# Patient Record
Sex: FEMALE | Race: WHITE | Employment: UNEMPLOYED | URBAN - METROPOLITAN AREA
[De-identification: names, ages, dates, MRNs, and addresses within clinical notes are randomized per-mention and may not be internally consistent; named-entity substitution may affect disease eponyms.]

---

## 2017-07-19 ENCOUNTER — HOSPITAL ENCOUNTER (OUTPATIENT)
Dept: OTHER | Age: 27
Discharge: HOME | End: 2017-07-19

## 2017-07-19 DIAGNOSIS — K59.00 UNSPECIFIED CONSTIPATION: ICD-10-CM

## 2017-07-19 DIAGNOSIS — J06.9 ACUTE UPPER RESPIRATORY INFECTIONS OF OTHER MULTIPLE SITES: ICD-10-CM

## 2020-06-17 ENCOUNTER — APPOINTMENT (OUTPATIENT)
Dept: CT IMAGING | Age: 30
DRG: 282 | End: 2020-06-17
Payer: COMMERCIAL

## 2020-06-17 ENCOUNTER — HOSPITAL ENCOUNTER (INPATIENT)
Age: 30
LOS: 4 days | Discharge: HOME | DRG: 282 | End: 2020-06-21
Attending: EMERGENCY MEDICINE | Admitting: INTERNAL MEDICINE
Payer: COMMERCIAL

## 2020-06-17 PROBLEM — K85.20 ALCOHOL INDUCED ACUTE PANCREATITIS WITHOUT NECROSIS OR INFECTION: Status: ACTIVE | Noted: 2020-06-17

## 2020-06-17 LAB
A/G RATIO: 1 (ref 1.1–2.2)
ALBUMIN SERPL-MCNC: 3.9 G/DL (ref 3.4–5)
ALP BLD-CCNC: 212 U/L (ref 40–129)
ALT SERPL-CCNC: 51 U/L (ref 10–40)
ANION GAP SERPL CALCULATED.3IONS-SCNC: 18 MMOL/L (ref 3–16)
AST SERPL-CCNC: 271 U/L (ref 15–37)
BACTERIA: ABNORMAL /HPF
BASOPHILS ABSOLUTE: 0 K/UL (ref 0–0.2)
BASOPHILS RELATIVE PERCENT: 0.9 %
BILIRUB SERPL-MCNC: 1.8 MG/DL (ref 0–1)
BILIRUBIN URINE: NEGATIVE
BLOOD, URINE: ABNORMAL
BUN BLDV-MCNC: 4 MG/DL (ref 7–20)
CALCIUM SERPL-MCNC: 8.6 MG/DL (ref 8.3–10.6)
CHLORIDE BLD-SCNC: 96 MMOL/L (ref 99–110)
CLARITY: CLEAR
CO2: 24 MMOL/L (ref 21–32)
COLOR: YELLOW
CREAT SERPL-MCNC: <0.5 MG/DL (ref 0.6–1.1)
EKG ATRIAL RATE: 82 BPM
EKG DIAGNOSIS: NORMAL
EKG P AXIS: 2 DEGREES
EKG P-R INTERVAL: 114 MS
EKG Q-T INTERVAL: 432 MS
EKG QRS DURATION: 92 MS
EKG QTC CALCULATION (BAZETT): 504 MS
EKG R AXIS: 77 DEGREES
EKG T AXIS: 83 DEGREES
EKG VENTRICULAR RATE: 82 BPM
EOSINOPHILS ABSOLUTE: 0 K/UL (ref 0–0.6)
EOSINOPHILS RELATIVE PERCENT: 0.9 %
EPITHELIAL CELLS, UA: ABNORMAL /HPF (ref 0–5)
GFR AFRICAN AMERICAN: >60
GFR NON-AFRICAN AMERICAN: >60
GLOBULIN: 3.9 G/DL
GLUCOSE BLD-MCNC: 90 MG/DL (ref 70–99)
GLUCOSE URINE: NEGATIVE MG/DL
HCG(URINE) PREGNANCY TEST: NEGATIVE
HCT VFR BLD CALC: 42.4 % (ref 36–48)
HEMOGLOBIN: 14.3 G/DL (ref 12–16)
KETONES, URINE: NEGATIVE MG/DL
LACTIC ACID: 2.6 MMOL/L (ref 0.4–2)
LEUKOCYTE ESTERASE, URINE: ABNORMAL
LIPASE: 550 U/L (ref 13–60)
LYMPHOCYTES ABSOLUTE: 2.1 K/UL (ref 1–5.1)
LYMPHOCYTES RELATIVE PERCENT: 40.8 %
MCH RBC QN AUTO: 34.2 PG (ref 26–34)
MCHC RBC AUTO-ENTMCNC: 33.7 G/DL (ref 31–36)
MCV RBC AUTO: 101.3 FL (ref 80–100)
MICROSCOPIC EXAMINATION: YES
MONOCYTES ABSOLUTE: 0.5 K/UL (ref 0–1.3)
MONOCYTES RELATIVE PERCENT: 8.7 %
NEUTROPHILS ABSOLUTE: 2.5 K/UL (ref 1.7–7.7)
NEUTROPHILS RELATIVE PERCENT: 48.7 %
NITRITE, URINE: POSITIVE
PDW BLD-RTO: 13.3 % (ref 12.4–15.4)
PH UA: 7 (ref 5–8)
PLATELET # BLD: 94 K/UL (ref 135–450)
PLATELET SLIDE REVIEW: ABNORMAL
PMV BLD AUTO: 8.6 FL (ref 5–10.5)
POTASSIUM REFLEX MAGNESIUM: 3.6 MMOL/L (ref 3.5–5.1)
PROTEIN UA: ABNORMAL MG/DL
RBC # BLD: 4.18 M/UL (ref 4–5.2)
RBC UA: ABNORMAL /HPF (ref 0–4)
SLIDE REVIEW: ABNORMAL
SODIUM BLD-SCNC: 138 MMOL/L (ref 136–145)
SPECIFIC GRAVITY UA: 1.01 (ref 1–1.03)
TOTAL PROTEIN: 7.8 G/DL (ref 6.4–8.2)
TROPONIN: <0.01 NG/ML
URINE TYPE: ABNORMAL
UROBILINOGEN, URINE: 4 E.U./DL
WBC # BLD: 5.2 K/UL (ref 4–11)
WBC UA: ABNORMAL /HPF (ref 0–5)

## 2020-06-17 PROCEDURE — 80053 COMPREHEN METABOLIC PANEL: CPT

## 2020-06-17 PROCEDURE — 6360000002 HC RX W HCPCS: Performed by: INTERNAL MEDICINE

## 2020-06-17 PROCEDURE — 6360000002 HC RX W HCPCS: Performed by: EMERGENCY MEDICINE

## 2020-06-17 PROCEDURE — 2580000003 HC RX 258: Performed by: EMERGENCY MEDICINE

## 2020-06-17 PROCEDURE — 84484 ASSAY OF TROPONIN QUANT: CPT

## 2020-06-17 PROCEDURE — 2580000003 HC RX 258: Performed by: INTERNAL MEDICINE

## 2020-06-17 PROCEDURE — 84703 CHORIONIC GONADOTROPIN ASSAY: CPT

## 2020-06-17 PROCEDURE — 96374 THER/PROPH/DIAG INJ IV PUSH: CPT

## 2020-06-17 PROCEDURE — 84478 ASSAY OF TRIGLYCERIDES: CPT

## 2020-06-17 PROCEDURE — 99285 EMERGENCY DEPT VISIT HI MDM: CPT

## 2020-06-17 PROCEDURE — 83605 ASSAY OF LACTIC ACID: CPT

## 2020-06-17 PROCEDURE — U0003 INFECTIOUS AGENT DETECTION BY NUCLEIC ACID (DNA OR RNA); SEVERE ACUTE RESPIRATORY SYNDROME CORONAVIRUS 2 (SARS-COV-2) (CORONAVIRUS DISEASE [COVID-19]), AMPLIFIED PROBE TECHNIQUE, MAKING USE OF HIGH THROUGHPUT TECHNOLOGIES AS DESCRIBED BY CMS-2020-01-R: HCPCS

## 2020-06-17 PROCEDURE — 36415 COLL VENOUS BLD VENIPUNCTURE: CPT

## 2020-06-17 PROCEDURE — 85025 COMPLETE CBC W/AUTO DIFF WBC: CPT

## 2020-06-17 PROCEDURE — 96375 TX/PRO/DX INJ NEW DRUG ADDON: CPT

## 2020-06-17 PROCEDURE — 1200000000 HC SEMI PRIVATE

## 2020-06-17 PROCEDURE — 2500000003 HC RX 250 WO HCPCS: Performed by: EMERGENCY MEDICINE

## 2020-06-17 PROCEDURE — 6360000004 HC RX CONTRAST MEDICATION: Performed by: EMERGENCY MEDICINE

## 2020-06-17 PROCEDURE — 83690 ASSAY OF LIPASE: CPT

## 2020-06-17 PROCEDURE — 81001 URINALYSIS AUTO W/SCOPE: CPT

## 2020-06-17 PROCEDURE — 93005 ELECTROCARDIOGRAM TRACING: CPT | Performed by: EMERGENCY MEDICINE

## 2020-06-17 PROCEDURE — 74177 CT ABD & PELVIS W/CONTRAST: CPT

## 2020-06-17 RX ORDER — ACETAMINOPHEN 650 MG/1
650 SUPPOSITORY RECTAL EVERY 6 HOURS PRN
Status: DISCONTINUED | OUTPATIENT
Start: 2020-06-17 | End: 2020-06-21 | Stop reason: HOSPADM

## 2020-06-17 RX ORDER — ONDANSETRON 2 MG/ML
4 INJECTION INTRAMUSCULAR; INTRAVENOUS ONCE
Status: DISCONTINUED | OUTPATIENT
Start: 2020-06-17 | End: 2020-06-17

## 2020-06-17 RX ORDER — NICOTINE 21 MG/24HR
1 PATCH, TRANSDERMAL 24 HOURS TRANSDERMAL DAILY
Status: DISCONTINUED | OUTPATIENT
Start: 2020-06-18 | End: 2020-06-21 | Stop reason: HOSPADM

## 2020-06-17 RX ORDER — THIAMINE MONONITRATE (VIT B1) 100 MG
100 TABLET ORAL DAILY
Status: DISCONTINUED | OUTPATIENT
Start: 2020-06-18 | End: 2020-06-21 | Stop reason: HOSPADM

## 2020-06-17 RX ORDER — NICOTINE 21 MG/24HR
1 PATCH, TRANSDERMAL 24 HOURS TRANSDERMAL DAILY
Status: DISCONTINUED | OUTPATIENT
Start: 2020-06-18 | End: 2020-06-17

## 2020-06-17 RX ORDER — SODIUM CHLORIDE, SODIUM LACTATE, POTASSIUM CHLORIDE, CALCIUM CHLORIDE 600; 310; 30; 20 MG/100ML; MG/100ML; MG/100ML; MG/100ML
1000 INJECTION, SOLUTION INTRAVENOUS ONCE
Status: COMPLETED | OUTPATIENT
Start: 2020-06-17 | End: 2020-06-17

## 2020-06-17 RX ORDER — SODIUM CHLORIDE 0.9 % (FLUSH) 0.9 %
10 SYRINGE (ML) INJECTION PRN
Status: DISCONTINUED | OUTPATIENT
Start: 2020-06-17 | End: 2020-06-21 | Stop reason: HOSPADM

## 2020-06-17 RX ORDER — PROMETHAZINE HYDROCHLORIDE 25 MG/1
12.5 TABLET ORAL EVERY 6 HOURS PRN
Status: DISCONTINUED | OUTPATIENT
Start: 2020-06-17 | End: 2020-06-17 | Stop reason: ALTCHOICE

## 2020-06-17 RX ORDER — SODIUM CHLORIDE 0.9 % (FLUSH) 0.9 %
10 SYRINGE (ML) INJECTION EVERY 12 HOURS SCHEDULED
Status: DISCONTINUED | OUTPATIENT
Start: 2020-06-17 | End: 2020-06-17 | Stop reason: SDUPTHER

## 2020-06-17 RX ORDER — ONDANSETRON 2 MG/ML
4 INJECTION INTRAMUSCULAR; INTRAVENOUS EVERY 6 HOURS PRN
Status: DISCONTINUED | OUTPATIENT
Start: 2020-06-17 | End: 2020-06-17 | Stop reason: ALTCHOICE

## 2020-06-17 RX ORDER — ACETAMINOPHEN 325 MG/1
650 TABLET ORAL EVERY 6 HOURS PRN
Status: DISCONTINUED | OUTPATIENT
Start: 2020-06-17 | End: 2020-06-21 | Stop reason: HOSPADM

## 2020-06-17 RX ORDER — FOLIC ACID 1 MG/1
1 TABLET ORAL DAILY
Status: DISCONTINUED | OUTPATIENT
Start: 2020-06-18 | End: 2020-06-21 | Stop reason: HOSPADM

## 2020-06-17 RX ORDER — MORPHINE SULFATE 2 MG/ML
4 INJECTION, SOLUTION INTRAMUSCULAR; INTRAVENOUS EVERY 4 HOURS PRN
Status: DISCONTINUED | OUTPATIENT
Start: 2020-06-17 | End: 2020-06-21 | Stop reason: HOSPADM

## 2020-06-17 RX ORDER — SODIUM CHLORIDE 0.9 % (FLUSH) 0.9 %
10 SYRINGE (ML) INJECTION PRN
Status: DISCONTINUED | OUTPATIENT
Start: 2020-06-17 | End: 2020-06-17 | Stop reason: ALTCHOICE

## 2020-06-17 RX ORDER — PROCHLORPERAZINE EDISYLATE 5 MG/ML
5 INJECTION INTRAMUSCULAR; INTRAVENOUS EVERY 6 HOURS PRN
Status: DISCONTINUED | OUTPATIENT
Start: 2020-06-17 | End: 2020-06-21 | Stop reason: HOSPADM

## 2020-06-17 RX ORDER — SODIUM CHLORIDE 0.9 % (FLUSH) 0.9 %
10 SYRINGE (ML) INJECTION EVERY 12 HOURS SCHEDULED
Status: DISCONTINUED | OUTPATIENT
Start: 2020-06-17 | End: 2020-06-21 | Stop reason: HOSPADM

## 2020-06-17 RX ORDER — LORAZEPAM 2 MG/ML
3 INJECTION INTRAMUSCULAR
Status: DISCONTINUED | OUTPATIENT
Start: 2020-06-17 | End: 2020-06-21 | Stop reason: HOSPADM

## 2020-06-17 RX ORDER — METHADONE HYDROCHLORIDE 10 MG/5ML
39 SOLUTION ORAL DAILY
Status: CANCELLED | OUTPATIENT
Start: 2020-06-17

## 2020-06-17 RX ORDER — POLYETHYLENE GLYCOL 3350 17 G/17G
17 POWDER, FOR SOLUTION ORAL DAILY PRN
Status: DISCONTINUED | OUTPATIENT
Start: 2020-06-17 | End: 2020-06-21 | Stop reason: HOSPADM

## 2020-06-17 RX ORDER — LORAZEPAM 2 MG/ML
1 INJECTION INTRAMUSCULAR
Status: DISCONTINUED | OUTPATIENT
Start: 2020-06-17 | End: 2020-06-21 | Stop reason: HOSPADM

## 2020-06-17 RX ORDER — LORAZEPAM 2 MG/ML
2 INJECTION INTRAMUSCULAR
Status: DISCONTINUED | OUTPATIENT
Start: 2020-06-17 | End: 2020-06-21 | Stop reason: HOSPADM

## 2020-06-17 RX ORDER — LORAZEPAM 1 MG/1
2 TABLET ORAL
Status: DISCONTINUED | OUTPATIENT
Start: 2020-06-17 | End: 2020-06-21 | Stop reason: HOSPADM

## 2020-06-17 RX ORDER — MULTIVITAMIN WITH IRON
1 TABLET ORAL DAILY
Status: DISCONTINUED | OUTPATIENT
Start: 2020-06-18 | End: 2020-06-21 | Stop reason: HOSPADM

## 2020-06-17 RX ORDER — LORAZEPAM 1 MG/1
1 TABLET ORAL
Status: DISCONTINUED | OUTPATIENT
Start: 2020-06-17 | End: 2020-06-21 | Stop reason: HOSPADM

## 2020-06-17 RX ORDER — LORAZEPAM 1 MG/1
4 TABLET ORAL
Status: DISCONTINUED | OUTPATIENT
Start: 2020-06-17 | End: 2020-06-21 | Stop reason: HOSPADM

## 2020-06-17 RX ORDER — LORAZEPAM 2 MG/ML
4 INJECTION INTRAMUSCULAR
Status: DISCONTINUED | OUTPATIENT
Start: 2020-06-17 | End: 2020-06-21 | Stop reason: HOSPADM

## 2020-06-17 RX ORDER — LORAZEPAM 1 MG/1
3 TABLET ORAL
Status: DISCONTINUED | OUTPATIENT
Start: 2020-06-17 | End: 2020-06-21 | Stop reason: HOSPADM

## 2020-06-17 RX ORDER — PROMETHAZINE HYDROCHLORIDE 25 MG/ML
12.5 INJECTION, SOLUTION INTRAMUSCULAR; INTRAVENOUS ONCE
Status: COMPLETED | OUTPATIENT
Start: 2020-06-17 | End: 2020-06-17

## 2020-06-17 RX ORDER — MORPHINE SULFATE 4 MG/ML
4 INJECTION, SOLUTION INTRAMUSCULAR; INTRAVENOUS ONCE
Status: COMPLETED | OUTPATIENT
Start: 2020-06-17 | End: 2020-06-17

## 2020-06-17 RX ORDER — THIAMINE HYDROCHLORIDE 100 MG/ML
100 INJECTION, SOLUTION INTRAMUSCULAR; INTRAVENOUS DAILY
Status: DISCONTINUED | OUTPATIENT
Start: 2020-06-18 | End: 2020-06-17 | Stop reason: CLARIF

## 2020-06-17 RX ORDER — SODIUM CHLORIDE, SODIUM LACTATE, POTASSIUM CHLORIDE, CALCIUM CHLORIDE 600; 310; 30; 20 MG/100ML; MG/100ML; MG/100ML; MG/100ML
INJECTION, SOLUTION INTRAVENOUS CONTINUOUS
Status: DISCONTINUED | OUTPATIENT
Start: 2020-06-17 | End: 2020-06-20

## 2020-06-17 RX ADMIN — Medication 10 ML: at 22:45

## 2020-06-17 RX ADMIN — IOPAMIDOL 80 ML: 755 INJECTION, SOLUTION INTRAVENOUS at 18:45

## 2020-06-17 RX ADMIN — MORPHINE SULFATE 4 MG: 2 INJECTION, SOLUTION INTRAMUSCULAR; INTRAVENOUS at 23:05

## 2020-06-17 RX ADMIN — PROMETHAZINE HYDROCHLORIDE 12.5 MG: 25 INJECTION INTRAMUSCULAR; INTRAVENOUS at 17:19

## 2020-06-17 RX ADMIN — FOLIC ACID: 5 INJECTION, SOLUTION INTRAMUSCULAR; INTRAVENOUS; SUBCUTANEOUS at 19:11

## 2020-06-17 RX ADMIN — LORAZEPAM 1 MG: 2 INJECTION INTRAMUSCULAR; INTRAVENOUS at 23:34

## 2020-06-17 RX ADMIN — MORPHINE SULFATE 4 MG: 4 INJECTION INTRAVENOUS at 19:11

## 2020-06-17 RX ADMIN — SODIUM CHLORIDE, SODIUM LACTATE, POTASSIUM CHLORIDE, AND CALCIUM CHLORIDE: 600; 310; 30; 20 INJECTION, SOLUTION INTRAVENOUS at 22:43

## 2020-06-17 RX ADMIN — SODIUM CHLORIDE, POTASSIUM CHLORIDE, SODIUM LACTATE AND CALCIUM CHLORIDE 1000 ML: 600; 310; 30; 20 INJECTION, SOLUTION INTRAVENOUS at 17:19

## 2020-06-17 ASSESSMENT — PAIN DESCRIPTION - DESCRIPTORS
DESCRIPTORS: STABBING
DESCRIPTORS: ACHING

## 2020-06-17 ASSESSMENT — ENCOUNTER SYMPTOMS
ABDOMINAL PAIN: 1
VOMITING: 1
COUGH: 0
SHORTNESS OF BREATH: 0
NAUSEA: 1
DIARRHEA: 1

## 2020-06-17 ASSESSMENT — PAIN DESCRIPTION - FREQUENCY
FREQUENCY: CONTINUOUS
FREQUENCY: INTERMITTENT

## 2020-06-17 ASSESSMENT — PAIN SCALES - GENERAL
PAINLEVEL_OUTOF10: 8
PAINLEVEL_OUTOF10: 0
PAINLEVEL_OUTOF10: 7
PAINLEVEL_OUTOF10: 6

## 2020-06-17 ASSESSMENT — PAIN DESCRIPTION - LOCATION
LOCATION: CHEST
LOCATION: ABDOMEN

## 2020-06-17 ASSESSMENT — PAIN DESCRIPTION - PAIN TYPE
TYPE: ACUTE PAIN
TYPE: ACUTE PAIN

## 2020-06-17 ASSESSMENT — PAIN DESCRIPTION - ORIENTATION: ORIENTATION: ANTERIOR

## 2020-06-17 NOTE — ED PROVIDER NOTES
[address] [facility] [address] ENCOUNTER          ATTENDING PHYSICIAN NOTE       Date of evaluation: 6/17/2020    Chief Complaint     Emesis (X3 Days)      History of Present Illness     Konrad Stanton is a 30 y.o. female who presents to the emergency department the chief complaint of nausea/vomiting and epigastric abdominal pain. She reports that she has had some less severe symptoms over the past week or so. She points to the mid epigastrium as the area of greatest pain. She is upset on arrival and states that it has been hurting \"all the time\" the past day or so. She notes that she drinks \"too much\". She denies fevers. She states that she does have some pain up into her chest.  She denies shortness of breath. Review of Systems     Review of Systems   Constitutional: Negative for chills and fever. Respiratory: Negative for cough and shortness of breath. Cardiovascular: Negative for chest pain. Gastrointestinal: Positive for abdominal pain, diarrhea, nausea and vomiting. Genitourinary: Negative for difficulty urinating and frequency. All other systems reviewed and are negative. Past Medical, Surgical, Family, and Social History     She has a past medical history of Hep C w/o coma, chronic (HCC) and Opiate dependence, continuous ([facility] [address] She has no past surgical history on file. Her family history is not on file. She reports that she has been smoking. She has a 7.50 pack-year smoking history. She has never used smokeless tobacco. She reports current alcohol use. She reports that she does not use drugs. Medications     Previous Medications    IBUPROFEN (ADVIL;MOTRIN) 600 MG TABLET    Take 1 tablet by mouth every 6 hours as needed for Pain Take with food    METHADONE 10 MG/5ML SOLUTION    Take 39 mg by mouth daily       Allergies     She is allergic to ceclor [cefaclor].     Physical Exam     INITIAL VITALS: BP: (!) 145/108, Temp: 98.1 °F (36.7 °C), Pulse: 102, Resp: 24, SpO2: 96 %   Physical Exam  Vitals signs and nursing note reviewed. Constitutional:       Appearance: She is well-developed. She is not diaphoretic. Comments: Appears upset, crying   HENT:      Head: Normocephalic and atraumatic. Mouth/Throat:      Mouth: Mucous membranes are dry. Eyes:      Pupils: Pupils are equal, round, and reactive to light. Neck:      Musculoskeletal: Normal range of motion. Cardiovascular:      Rate and Rhythm: Regular rhythm. Tachycardia present. Pulmonary:      Effort: Pulmonary effort is normal.      Breath sounds: Normal breath sounds. Abdominal:      General: There is no distension. Palpations: Abdomen is soft. There is no mass. Tenderness: There is abdominal tenderness (Midepigastrium, no jatinder peritonitis). There is no guarding. Musculoskeletal: Normal range of motion. Skin:     General: Skin is warm and dry. Capillary Refill: Capillary refill takes less than 2 seconds. Findings: No rash. Neurological:      Mental Status: She is alert and oriented to person, place, and time. Psychiatric:         Behavior: Behavior normal.         Diagnostic Results     EKG   Normal sinus rhythm with ventricular rate of 82 bpm.  HI interval 114. QRS duration 92. QTc borderline prolonged at 504 ms. No acute ischemic changes. RADIOLOGY:  CT ABDOMEN PELVIS W IV CONTRAST Additional Contrast? None   Final Result      1. Extensive hepatic steatosis. 2.  Diffuse infiltrative stranding and fluid surrounding the pancreatic head and body extending into the lesser sac. These findings raise concern for possible acute pancreatitis. Correlation with clinical and laboratory findings is suggested. 3.  Nonobstructive bowel gas pattern.                 LABS:   Results for orders placed or performed during the hospital encounter of 06/17/20   CBC Auto Differential   Result Value Ref Range    WBC 5.2 4.0 - 11.0 K/uL    RBC 4.18 4.00 - 5.20 M/uL    Hemoglobin 14.3 12.0 - 16.0 g/dL    Hematocrit 42.4 36.0 - 48.0 %    .3 (H) 80.0 - 100.0 fL    MCH 34.2 (H) 26.0 - 34.0 pg    MCHC 33.7 31.0 - 36.0 g/dL    RDW 13.3 12.4 - 15.4 %    Platelets 94 (L) 038 - 450 K/uL    MPV 8.6 5.0 - 10.5 fL    PLATELET SLIDE REVIEW Decreased     SLIDE REVIEW see below     Neutrophils % 48.7 %    Lymphocytes % 40.8 %    Monocytes % 8.7 %    Eosinophils % 0.9 %    Basophils % 0.9 %    Neutrophils Absolute 2.5 1.7 - 7.7 K/uL    Lymphocytes Absolute 2.1 1.0 - 5.1 K/uL    Monocytes Absolute 0.5 0.0 - 1.3 K/uL    Eosinophils Absolute 0.0 0.0 - 0.6 K/uL    Basophils Absolute 0.0 0.0 - 0.2 K/uL   Comprehensive Metabolic Panel w/ Reflex to MG   Result Value Ref Range    Sodium 138 136 - 145 mmol/L    Potassium reflex Magnesium 3.6 3.5 - 5.1 mmol/L    Chloride 96 (L) 99 - 110 mmol/L    CO2 24 21 - 32 mmol/L    Anion Gap 18 (H) 3 - 16    Glucose 90 70 - 99 mg/dL    BUN 4 (L) 7 - 20 mg/dL    CREATININE <0.5 (L) 0.6 - 1.1 mg/dL    GFR Non-African American >60 >60    GFR African American >60 >60    Calcium 8.6 8.3 - 10.6 mg/dL    Total Protein 7.8 6.4 - 8.2 g/dL    Alb 3.9 3.4 - 5.0 g/dL    Albumin/Globulin Ratio 1.0 (L) 1.1 - 2.2    Total Bilirubin 1.8 (H) 0.0 - 1.0 mg/dL    Alkaline Phosphatase 212 (H) 40 - 129 U/L    ALT 51 (H) 10 - 40 U/L     (H) 15 - 37 U/L    Globulin 3.9 g/dL   Lipase   Result Value Ref Range    Lipase 550.0 (H) 13.0 - 60.0 U/L   Troponin   Result Value Ref Range    Troponin <0.01 <0.01 ng/mL   Urinalysis, reflex to microscopic   Result Value Ref Range    Color, UA Yellow Straw/Yellow    Clarity, UA Clear Clear    Glucose, Ur Negative Negative mg/dL    Bilirubin Urine Negative Negative    Ketones, Urine Negative Negative mg/dL    Specific Gravity, UA 1.010 1.005 - 1.030    Blood, Urine TRACE-INTACT (A) Negative    pH, UA 7.0 5.0 - 8.0    Protein, UA TRACE (A) Negative mg/dL    Urobilinogen, Urine 4.0 (A) <2.0 E.U./dL    Nitrite, Urine POSITIVE (A) Negative    Leukocyte Esterase, Urine LARGE (A) Negative    Microscopic Examination YES     Urine Type NotGiven    Pregnancy, Urine   Result Value Ref Range    HCG(Urine) Pregnancy Test Negative Detects HCG level >20 MIU/mL   Lactic Acid, Plasma   Result Value Ref Range    Lactic Acid 2.6 (H) 0.4 - 2.0 mmol/L   Microscopic Urinalysis   Result Value Ref Range    WBC, UA 3-5 0 - 5 /HPF    RBC, UA 0-2 0 - 4 /HPF    Epithelial Cells, UA 6-10 (A) 0 - 5 /HPF    Bacteria, UA 4+ (A) None Seen /HPF   EKG 12 Lead   Result Value Ref Range    Ventricular Rate 82 BPM    Atrial Rate 82 BPM    P-R Interval 114 ms    QRS Duration 92 ms    Q-T Interval 432 ms    QTc Calculation (Bazett) 504 ms    P Axis 2 degrees    R Axis 77 degrees    T Axis 83 degrees    Diagnosis       EKG performed in ER and to be interpreted by ER physician. Confirmed by MD, ER ([id number]),  Sherron Montano ([id number]) on 6/17/2020 5:18:59 PM       ED BEDSIDE ULTRASOUND:  None. RECENT VITALS:  BP: 126/86,Temp: 98.1 °F (36.7 °C), Pulse: 99, Resp: 22, SpO2: 94 %     Procedures     None. ED Course     Nursing Notes, Past Medical Hx, Past Surgical Hx, Social Hx,Allergies, and Family Hx were reviewed. patient was given the following medications:  Orders Placed This Encounter   Medications    lactated ringers infusion 1,000 mL    DISCONTD: ondansetron (ZOFRAN) injection 4 mg    promethazine (PHENERGAN) injection 12.5 mg    sodium chloride 0.9 % 50 mL with folic acid 1 mg, adult multi-vitamin with vitamin k 10 mL, thiamine 100 mg    iopamidol (ISOVUE-370) 76 % injection 80 mL    morphine injection 4 mg       CONSULTS:  IP CONSULT TO HOSPITALIST    MEDICAL DECISIONMAKING / Lynnette Recinos / Solitario Reno is a 30 y.o. female who presents to the emergency department the chief complaint of epigastric abdominal pain and nausea/vomiting which is been ongoing over the past few days.   She does have moderate epigastric tenderness on exam.  She notes during history taking that she

## 2020-06-17 NOTE — ED NOTES
Patient complains of nausea/vomiting x 3 days. Patient placed on the monitor and EKG completed. PIV placed using aseptic technique per hospital policy. Blood collected and sent to lab. Bed locked and low, call light within reach.      Nicole Melissa RN  06/17/20 4401

## 2020-06-18 ENCOUNTER — APPOINTMENT (OUTPATIENT)
Dept: ULTRASOUND IMAGING | Age: 30
DRG: 282 | End: 2020-06-18
Payer: COMMERCIAL

## 2020-06-18 LAB
A/G RATIO: 1.1 (ref 1.1–2.2)
ALBUMIN SERPL-MCNC: 3.6 G/DL (ref 3.4–5)
ALP BLD-CCNC: 196 U/L (ref 40–129)
ALT SERPL-CCNC: 43 U/L (ref 10–40)
ANION GAP SERPL CALCULATED.3IONS-SCNC: 17 MMOL/L (ref 3–16)
AST SERPL-CCNC: 238 U/L (ref 15–37)
BILIRUB SERPL-MCNC: 2 MG/DL (ref 0–1)
BUN BLDV-MCNC: 4 MG/DL (ref 7–20)
CALCIUM SERPL-MCNC: 8.6 MG/DL (ref 8.3–10.6)
CHLORIDE BLD-SCNC: 99 MMOL/L (ref 99–110)
CO2: 24 MMOL/L (ref 21–32)
CREAT SERPL-MCNC: <0.5 MG/DL (ref 0.6–1.1)
GFR AFRICAN AMERICAN: >60
GFR NON-AFRICAN AMERICAN: >60
GLOBULIN: 3.4 G/DL
GLUCOSE BLD-MCNC: 65 MG/DL (ref 70–99)
GLUCOSE BLD-MCNC: 80 MG/DL (ref 70–99)
HCT VFR BLD CALC: 38.9 % (ref 36–48)
HEMOGLOBIN: 13.1 G/DL (ref 12–16)
MAGNESIUM: 1.3 MG/DL (ref 1.8–2.4)
MCH RBC QN AUTO: 34.1 PG (ref 26–34)
MCHC RBC AUTO-ENTMCNC: 33.6 G/DL (ref 31–36)
MCV RBC AUTO: 101.7 FL (ref 80–100)
PDW BLD-RTO: 13.5 % (ref 12.4–15.4)
PERFORMED ON: NORMAL
PLATELET # BLD: 55 K/UL (ref 135–450)
PMV BLD AUTO: 8.5 FL (ref 5–10.5)
POTASSIUM REFLEX MAGNESIUM: 3.1 MMOL/L (ref 3.5–5.1)
POTASSIUM REFLEX MAGNESIUM: 3.7 MMOL/L (ref 3.5–5.1)
RBC # BLD: 3.83 M/UL (ref 4–5.2)
REPORT: NORMAL
SARS-COV-2: NOT DETECTED
SODIUM BLD-SCNC: 140 MMOL/L (ref 136–145)
THIS TEST SENT TO: NORMAL
TOTAL PROTEIN: 7 G/DL (ref 6.4–8.2)
TRIGL SERPL-MCNC: 97 MG/DL (ref 0–150)
VITAMIN B-12: 1110 PG/ML (ref 211–911)
WBC # BLD: 4.3 K/UL (ref 4–11)

## 2020-06-18 PROCEDURE — 6370000000 HC RX 637 (ALT 250 FOR IP): Performed by: INTERNAL MEDICINE

## 2020-06-18 PROCEDURE — 6360000002 HC RX W HCPCS: Performed by: INTERNAL MEDICINE

## 2020-06-18 PROCEDURE — 2060000000 HC ICU INTERMEDIATE R&B

## 2020-06-18 PROCEDURE — 76705 ECHO EXAM OF ABDOMEN: CPT

## 2020-06-18 PROCEDURE — 2580000003 HC RX 258: Performed by: INTERNAL MEDICINE

## 2020-06-18 PROCEDURE — 84132 ASSAY OF SERUM POTASSIUM: CPT

## 2020-06-18 PROCEDURE — 83735 ASSAY OF MAGNESIUM: CPT

## 2020-06-18 PROCEDURE — 80053 COMPREHEN METABOLIC PANEL: CPT

## 2020-06-18 PROCEDURE — 94760 N-INVAS EAR/PLS OXIMETRY 1: CPT

## 2020-06-18 PROCEDURE — 85027 COMPLETE CBC AUTOMATED: CPT

## 2020-06-18 PROCEDURE — 82607 VITAMIN B-12: CPT

## 2020-06-18 RX ORDER — MAGNESIUM SULFATE IN WATER 40 MG/ML
2 INJECTION, SOLUTION INTRAVENOUS ONCE
Status: COMPLETED | OUTPATIENT
Start: 2020-06-18 | End: 2020-06-18

## 2020-06-18 RX ORDER — POTASSIUM CHLORIDE 7.45 MG/ML
10 INJECTION INTRAVENOUS
Status: DISCONTINUED | OUTPATIENT
Start: 2020-06-18 | End: 2020-06-18 | Stop reason: CLARIF

## 2020-06-18 RX ORDER — POTASSIUM CHLORIDE 7.45 MG/ML
10 INJECTION INTRAVENOUS
Status: COMPLETED | OUTPATIENT
Start: 2020-06-18 | End: 2020-06-18

## 2020-06-18 RX ADMIN — MAGNESIUM SULFATE IN WATER 2 G: 40 INJECTION, SOLUTION INTRAVENOUS at 11:23

## 2020-06-18 RX ADMIN — SODIUM CHLORIDE, SODIUM LACTATE, POTASSIUM CHLORIDE, AND CALCIUM CHLORIDE: 600; 310; 30; 20 INJECTION, SOLUTION INTRAVENOUS at 14:32

## 2020-06-18 RX ADMIN — MORPHINE SULFATE 4 MG: 2 INJECTION, SOLUTION INTRAMUSCULAR; INTRAVENOUS at 21:35

## 2020-06-18 RX ADMIN — LORAZEPAM 2 MG: 2 INJECTION INTRAMUSCULAR; INTRAVENOUS at 16:01

## 2020-06-18 RX ADMIN — SODIUM CHLORIDE, SODIUM LACTATE, POTASSIUM CHLORIDE, AND CALCIUM CHLORIDE: 600; 310; 30; 20 INJECTION, SOLUTION INTRAVENOUS at 02:58

## 2020-06-18 RX ADMIN — Medication 10 ML: at 21:38

## 2020-06-18 RX ADMIN — LORAZEPAM 2 MG: 2 INJECTION INTRAMUSCULAR; INTRAVENOUS at 22:29

## 2020-06-18 RX ADMIN — LORAZEPAM 2 MG: 2 INJECTION INTRAMUSCULAR; INTRAVENOUS at 08:14

## 2020-06-18 RX ADMIN — MORPHINE SULFATE 4 MG: 2 INJECTION, SOLUTION INTRAMUSCULAR; INTRAVENOUS at 17:12

## 2020-06-18 RX ADMIN — MORPHINE SULFATE 4 MG: 2 INJECTION, SOLUTION INTRAMUSCULAR; INTRAVENOUS at 09:04

## 2020-06-18 RX ADMIN — POTASSIUM CHLORIDE 10 MEQ: 10 INJECTION, SOLUTION INTRAVENOUS at 15:55

## 2020-06-18 RX ADMIN — POTASSIUM CHLORIDE 10 MEQ: 10 INJECTION, SOLUTION INTRAVENOUS at 14:30

## 2020-06-18 RX ADMIN — LORAZEPAM 1 MG: 2 INJECTION INTRAMUSCULAR; INTRAVENOUS at 09:16

## 2020-06-18 RX ADMIN — MORPHINE SULFATE 4 MG: 2 INJECTION, SOLUTION INTRAMUSCULAR; INTRAVENOUS at 03:23

## 2020-06-18 RX ADMIN — SODIUM CHLORIDE, SODIUM LACTATE, POTASSIUM CHLORIDE, AND CALCIUM CHLORIDE: 600; 310; 30; 20 INJECTION, SOLUTION INTRAVENOUS at 10:28

## 2020-06-18 RX ADMIN — SODIUM CHLORIDE, SODIUM LACTATE, POTASSIUM CHLORIDE, AND CALCIUM CHLORIDE: 600; 310; 30; 20 INJECTION, SOLUTION INTRAVENOUS at 19:00

## 2020-06-18 RX ADMIN — LORAZEPAM 2 MG: 2 INJECTION INTRAMUSCULAR; INTRAVENOUS at 18:56

## 2020-06-18 RX ADMIN — LORAZEPAM 1 MG: 2 INJECTION INTRAMUSCULAR; INTRAVENOUS at 10:27

## 2020-06-18 RX ADMIN — SODIUM CHLORIDE, SODIUM LACTATE, POTASSIUM CHLORIDE, AND CALCIUM CHLORIDE: 600; 310; 30; 20 INJECTION, SOLUTION INTRAVENOUS at 06:12

## 2020-06-18 RX ADMIN — POTASSIUM CHLORIDE 10 MEQ: 10 INJECTION, SOLUTION INTRAVENOUS at 17:09

## 2020-06-18 RX ADMIN — LORAZEPAM 1 MG: 2 INJECTION INTRAMUSCULAR; INTRAVENOUS at 00:35

## 2020-06-18 RX ADMIN — Medication 10 ML: at 08:14

## 2020-06-18 RX ADMIN — PROCHLORPERAZINE EDISYLATE 5 MG: 5 INJECTION INTRAMUSCULAR; INTRAVENOUS at 00:36

## 2020-06-18 RX ADMIN — POTASSIUM CHLORIDE 10 MEQ: 10 INJECTION, SOLUTION INTRAVENOUS at 18:11

## 2020-06-18 RX ADMIN — SODIUM CHLORIDE, SODIUM LACTATE, POTASSIUM CHLORIDE, AND CALCIUM CHLORIDE: 600; 310; 30; 20 INJECTION, SOLUTION INTRAVENOUS at 23:00

## 2020-06-18 ASSESSMENT — PAIN - FUNCTIONAL ASSESSMENT
PAIN_FUNCTIONAL_ASSESSMENT: PREVENTS OR INTERFERES SOME ACTIVE ACTIVITIES AND ADLS

## 2020-06-18 ASSESSMENT — PAIN DESCRIPTION - PAIN TYPE
TYPE: ACUTE PAIN

## 2020-06-18 ASSESSMENT — PAIN DESCRIPTION - PROGRESSION
CLINICAL_PROGRESSION: NOT CHANGED
CLINICAL_PROGRESSION: NOT CHANGED
CLINICAL_PROGRESSION_2: NOT CHANGED
CLINICAL_PROGRESSION: NOT CHANGED
CLINICAL_PROGRESSION: NOT CHANGED
CLINICAL_PROGRESSION: GRADUALLY WORSENING
CLINICAL_PROGRESSION: NOT CHANGED

## 2020-06-18 ASSESSMENT — PAIN DESCRIPTION - LOCATION
LOCATION: ABDOMEN;LEG
LOCATION_2: LEG
LOCATION: ABDOMEN
LOCATION: ABDOMEN
LOCATION: ABDOMEN;LEG;SHOULDER
LOCATION: LEG
LOCATION: ABDOMEN;LEG
LOCATION: ABDOMEN;LEG;SHOULDER
LOCATION: ABDOMEN;LEG;SHOULDER
LOCATION: ABDOMEN;LEG

## 2020-06-18 ASSESSMENT — PAIN SCALES - GENERAL
PAINLEVEL_OUTOF10: 6
PAINLEVEL_OUTOF10: 8
PAINLEVEL_OUTOF10: 8
PAINLEVEL_OUTOF10: 0
PAINLEVEL_OUTOF10: 8
PAINLEVEL_OUTOF10: 6
PAINLEVEL_OUTOF10: 4
PAINLEVEL_OUTOF10: 7
PAINLEVEL_OUTOF10: 8
PAINLEVEL_OUTOF10: 0

## 2020-06-18 ASSESSMENT — PAIN DESCRIPTION - DESCRIPTORS
DESCRIPTORS: ACHING;PINS AND NEEDLES;NUMBNESS
DESCRIPTORS: ACHING
DESCRIPTORS_2: NUMBNESS;TINGLING;THROBBING
DESCRIPTORS: ACHING;NUMBNESS;PINS AND NEEDLES
DESCRIPTORS: ACHING;NUMBNESS;PINS AND NEEDLES
DESCRIPTORS: STABBING
DESCRIPTORS: ACHING;NUMBNESS;PINS AND NEEDLES
DESCRIPTORS: PINS AND NEEDLES
DESCRIPTORS: ACHING;NUMBNESS;PINS AND NEEDLES
DESCRIPTORS: ACHING;NUMBNESS;TINGLING

## 2020-06-18 ASSESSMENT — PAIN DESCRIPTION - ONSET
ONSET: ON-GOING
ONSET_2: ON-GOING
ONSET: ON-GOING

## 2020-06-18 ASSESSMENT — PAIN DESCRIPTION - INTENSITY: RATING_2: 8

## 2020-06-18 ASSESSMENT — PAIN DESCRIPTION - DURATION: DURATION_2: CONTINUOUS

## 2020-06-18 ASSESSMENT — PAIN DESCRIPTION - ORIENTATION
ORIENTATION: UPPER;RIGHT;LEFT
ORIENTATION: ANTERIOR;UPPER
ORIENTATION: UPPER;LEFT;RIGHT
ORIENTATION: UPPER;RIGHT;LEFT
ORIENTATION: RIGHT;LEFT
ORIENTATION_2: DISTAL
ORIENTATION: RIGHT;LEFT;UPPER
ORIENTATION: UPPER;RIGHT;LEFT
ORIENTATION: RIGHT;LEFT;MID;UPPER

## 2020-06-18 ASSESSMENT — PAIN DESCRIPTION - FREQUENCY
FREQUENCY: CONTINUOUS
FREQUENCY: INTERMITTENT
FREQUENCY: CONTINUOUS
FREQUENCY: INTERMITTENT

## 2020-06-18 NOTE — PROGRESS NOTES
+2 palpable, equal bilaterally       Labs:   Recent Labs     06/17/20  1702 06/18/20  0305   WBC 5.2 4.3   HGB 14.3 13.1   HCT 42.4 38.9   PLT 94* 55*     Recent Labs     06/17/20  1702 06/18/20  0305    140   K 3.6 3.1*   CL 96* 99   CO2 24 24   BUN 4* 4*   CREATININE <0.5* <0.5*   CALCIUM 8.6 8.6     Recent Labs     06/17/20  1702 06/18/20  0305   * 238*   ALT 51* 43*   BILITOT 1.8* 2.0*   ALKPHOS 212* 196*     No results for input(s): INR in the last 72 hours. Recent Labs     06/17/20  1702   TROPONINI <0.01       Urinalysis:      Lab Results   Component Value Date    NITRU POSITIVE 06/17/2020    WBCUA 3-5 06/17/2020    BACTERIA 4+ 06/17/2020    RBCUA 0-2 06/17/2020    BLOODU TRACE-INTACT 06/17/2020    SPECGRAV 1.010 06/17/2020    GLUCOSEU Negative 06/17/2020       Radiology:  US ABDOMEN LIMITED   Final Result   IMPRESSION :      Hepatomegaly and hepatic steatosis. Cholelithiasis. Sonographic findings pertaining to previously noted pancreatitis. CT ABDOMEN PELVIS W IV CONTRAST Additional Contrast? None   Final Result      1. Extensive hepatic steatosis. 2.  Diffuse infiltrative stranding and fluid surrounding the pancreatic head and body extending into the lesser sac. These findings raise concern for possible acute pancreatitis. Correlation with clinical and laboratory findings is suggested. 3.  Nonobstructive bowel gas pattern. Assessment/Plan:    Active Hospital Problems    Diagnosis    Alcohol induced acute pancreatitis without necrosis or infection [K85.20]       Patient is a 47-year-old female with past medical history of alcohol abuse who presented to hospital for abdominal pain.   Admitted to the hospital for pancreatitis    Assessment  Epigastric abdominal pain secondary to acute pancreatitis secondary to alcoholism  Elevated LFTs secondary to alcohol abuse related  History of chronic hepatitis C  Macrocytosis  Thrombocytopenia  COVID-19 ruled

## 2020-06-18 NOTE — ED NOTES
Report given to 4th Floor RN, Chase Chaparro. No other needs identified at this time, VSS on cardiac monitor.       Lashon Reyes, DANIEL  06/17/20 7030

## 2020-06-18 NOTE — ED NOTES
Report given to receiving RN on [address]. Pt will transferred to room 6319.      Luz Salas RN  06/17/20 2038

## 2020-06-18 NOTE — PLAN OF CARE
Problem: Pain:  Goal: Pain level will decrease  Description: Pain level will decrease  Outcome: Ongoing   Patients pain this shift has improved with the Q4 morphine PRN, will continue to reassess pain this shift      Problem: Falls - Risk of:  Goal: Will remain free from falls  Description: Will remain free from falls  Outcome: Ongoing   Patient ambulating well this shift, stand by assist. Patient uses call light appropriately. Bed alarm armed.  Will continue to monitor

## 2020-06-18 NOTE — PROGRESS NOTES
Patient reporting new visual disturbances, she states she thinks she is seeing things that are not there when she opens her eyes. Ativan being given per [facility] and CIWA-Ar score. Will reassess in 1 hour.

## 2020-06-19 LAB
ANION GAP SERPL CALCULATED.3IONS-SCNC: 14 MMOL/L (ref 3–16)
BUN BLDV-MCNC: <2 MG/DL (ref 7–20)
CALCIUM SERPL-MCNC: 8.8 MG/DL (ref 8.3–10.6)
CHLORIDE BLD-SCNC: 96 MMOL/L (ref 99–110)
CO2: 24 MMOL/L (ref 21–32)
CREAT SERPL-MCNC: <0.5 MG/DL (ref 0.6–1.1)
GFR AFRICAN AMERICAN: >60
GFR NON-AFRICAN AMERICAN: >60
GLUCOSE BLD-MCNC: 86 MG/DL (ref 70–99)
POTASSIUM REFLEX MAGNESIUM: 3.6 MMOL/L (ref 3.5–5.1)
SODIUM BLD-SCNC: 134 MMOL/L (ref 136–145)

## 2020-06-19 PROCEDURE — 2580000003 HC RX 258: Performed by: INTERNAL MEDICINE

## 2020-06-19 PROCEDURE — 36415 COLL VENOUS BLD VENIPUNCTURE: CPT

## 2020-06-19 PROCEDURE — 6370000000 HC RX 637 (ALT 250 FOR IP): Performed by: INTERNAL MEDICINE

## 2020-06-19 PROCEDURE — 6360000002 HC RX W HCPCS: Performed by: STUDENT IN AN ORGANIZED HEALTH CARE EDUCATION/TRAINING PROGRAM

## 2020-06-19 PROCEDURE — 2500000003 HC RX 250 WO HCPCS: Performed by: STUDENT IN AN ORGANIZED HEALTH CARE EDUCATION/TRAINING PROGRAM

## 2020-06-19 PROCEDURE — 6360000002 HC RX W HCPCS: Performed by: INTERNAL MEDICINE

## 2020-06-19 PROCEDURE — 93005 ELECTROCARDIOGRAM TRACING: CPT | Performed by: STUDENT IN AN ORGANIZED HEALTH CARE EDUCATION/TRAINING PROGRAM

## 2020-06-19 PROCEDURE — 80048 BASIC METABOLIC PNL TOTAL CA: CPT

## 2020-06-19 PROCEDURE — 2060000000 HC ICU INTERMEDIATE R&B

## 2020-06-19 RX ORDER — CIPROFLOXACIN 2 MG/ML
400 INJECTION, SOLUTION INTRAVENOUS EVERY 12 HOURS
Status: DISCONTINUED | OUTPATIENT
Start: 2020-06-19 | End: 2020-06-19 | Stop reason: ALTCHOICE

## 2020-06-19 RX ORDER — LORAZEPAM 2 MG/ML
INJECTION INTRAMUSCULAR
Status: DISPENSED
Start: 2020-06-19 | End: 2020-06-20

## 2020-06-19 RX ORDER — METOPROLOL TARTRATE 5 MG/5ML
5 INJECTION INTRAVENOUS
Status: COMPLETED | OUTPATIENT
Start: 2020-06-19 | End: 2020-06-19

## 2020-06-19 RX ORDER — METOPROLOL TARTRATE 5 MG/5ML
5 INJECTION INTRAVENOUS EVERY 5 MIN PRN
Status: DISCONTINUED | OUTPATIENT
Start: 2020-06-19 | End: 2020-06-21 | Stop reason: HOSPADM

## 2020-06-19 RX ORDER — GABAPENTIN 100 MG/1
200 CAPSULE ORAL 3 TIMES DAILY
Status: COMPLETED | OUTPATIENT
Start: 2020-06-19 | End: 2020-06-21

## 2020-06-19 RX ORDER — METOPROLOL TARTRATE 5 MG/5ML
INJECTION INTRAVENOUS
Status: DISPENSED
Start: 2020-06-19 | End: 2020-06-20

## 2020-06-19 RX ORDER — DIAZEPAM 5 MG/1
5 TABLET ORAL 3 TIMES DAILY
Status: DISCONTINUED | OUTPATIENT
Start: 2020-06-19 | End: 2020-06-21 | Stop reason: HOSPADM

## 2020-06-19 RX ORDER — LORAZEPAM 2 MG/ML
2 INJECTION INTRAMUSCULAR
Status: COMPLETED | OUTPATIENT
Start: 2020-06-19 | End: 2020-06-19

## 2020-06-19 RX ADMIN — THIAMINE HCL TAB 100 MG 100 MG: 100 TAB at 09:19

## 2020-06-19 RX ADMIN — THERA TABS 1 TABLET: TAB at 09:19

## 2020-06-19 RX ADMIN — DIAZEPAM 5 MG: 5 TABLET ORAL at 21:26

## 2020-06-19 RX ADMIN — DIAZEPAM 5 MG: 5 TABLET ORAL at 13:55

## 2020-06-19 RX ADMIN — SODIUM CHLORIDE, SODIUM LACTATE, POTASSIUM CHLORIDE, AND CALCIUM CHLORIDE: 600; 310; 30; 20 INJECTION, SOLUTION INTRAVENOUS at 08:15

## 2020-06-19 RX ADMIN — LORAZEPAM 1 MG: 2 INJECTION INTRAMUSCULAR; INTRAVENOUS at 04:52

## 2020-06-19 RX ADMIN — METOPROLOL TARTRATE 5 MG: 5 INJECTION INTRAVENOUS at 21:26

## 2020-06-19 RX ADMIN — SODIUM CHLORIDE, SODIUM LACTATE, POTASSIUM CHLORIDE, AND CALCIUM CHLORIDE: 600; 310; 30; 20 INJECTION, SOLUTION INTRAVENOUS at 21:25

## 2020-06-19 RX ADMIN — LORAZEPAM 4 MG: 2 INJECTION INTRAMUSCULAR; INTRAVENOUS at 23:24

## 2020-06-19 RX ADMIN — LORAZEPAM 1 MG: 2 INJECTION INTRAMUSCULAR; INTRAVENOUS at 06:49

## 2020-06-19 RX ADMIN — MORPHINE SULFATE 4 MG: 2 INJECTION, SOLUTION INTRAMUSCULAR; INTRAVENOUS at 03:40

## 2020-06-19 RX ADMIN — METOPROLOL TARTRATE 5 MG: 5 INJECTION INTRAVENOUS at 17:40

## 2020-06-19 RX ADMIN — LORAZEPAM 2 MG: 2 INJECTION INTRAMUSCULAR; INTRAVENOUS at 17:40

## 2020-06-19 RX ADMIN — Medication 10 ML: at 21:26

## 2020-06-19 RX ADMIN — LORAZEPAM 4 MG: 2 INJECTION INTRAMUSCULAR; INTRAVENOUS at 21:24

## 2020-06-19 RX ADMIN — CEFTRIAXONE 1 G: 1 INJECTION, POWDER, FOR SOLUTION INTRAMUSCULAR; INTRAVENOUS at 13:55

## 2020-06-19 RX ADMIN — LORAZEPAM 4 MG: 2 INJECTION INTRAMUSCULAR; INTRAVENOUS at 22:35

## 2020-06-19 RX ADMIN — GABAPENTIN 200 MG: 100 CAPSULE ORAL at 21:26

## 2020-06-19 RX ADMIN — GABAPENTIN 200 MG: 100 CAPSULE ORAL at 13:55

## 2020-06-19 RX ADMIN — FOLIC ACID 1 MG: 1 TABLET ORAL at 09:19

## 2020-06-19 RX ADMIN — Medication 10 ML: at 22:36

## 2020-06-19 RX ADMIN — LORAZEPAM 2 MG: 1 TABLET ORAL at 17:00

## 2020-06-19 RX ADMIN — SODIUM CHLORIDE, SODIUM LACTATE, POTASSIUM CHLORIDE, AND CALCIUM CHLORIDE: 600; 310; 30; 20 INJECTION, SOLUTION INTRAVENOUS at 03:34

## 2020-06-19 RX ADMIN — LORAZEPAM 3 MG: 1 TABLET ORAL at 09:18

## 2020-06-19 RX ADMIN — LORAZEPAM 2 MG: 1 TABLET ORAL at 11:46

## 2020-06-19 ASSESSMENT — PAIN SCALES - GENERAL
PAINLEVEL_OUTOF10: 0
PAINLEVEL_OUTOF10: 7

## 2020-06-19 ASSESSMENT — PAIN DESCRIPTION - PROGRESSION
CLINICAL_PROGRESSION: NOT CHANGED

## 2020-06-19 ASSESSMENT — PAIN - FUNCTIONAL ASSESSMENT: PAIN_FUNCTIONAL_ASSESSMENT: PREVENTS OR INTERFERES SOME ACTIVE ACTIVITIES AND ADLS

## 2020-06-19 ASSESSMENT — PAIN DESCRIPTION - DESCRIPTORS: DESCRIPTORS: ACHING

## 2020-06-19 ASSESSMENT — PAIN DESCRIPTION - ONSET: ONSET: ON-GOING

## 2020-06-19 ASSESSMENT — PAIN DESCRIPTION - ORIENTATION: ORIENTATION: RIGHT;LEFT;MID

## 2020-06-19 ASSESSMENT — PAIN DESCRIPTION - LOCATION: LOCATION: ABDOMEN

## 2020-06-19 ASSESSMENT — PAIN DESCRIPTION - FREQUENCY: FREQUENCY: CONTINUOUS

## 2020-06-19 ASSESSMENT — PAIN DESCRIPTION - PAIN TYPE: TYPE: ACUTE PAIN

## 2020-06-19 NOTE — PROGRESS NOTES
Patient is alert and oriented x4, up with SBA, call light within reach, bed/chair alarm on. AM meds complete, patient tolerated well. Patients HR elevated this AM, CIWA score was 17, PRN Ativan given, will monitor.  Electronically signed by Bela Solomon RN on 6/19/2020 at 10:11 AM

## 2020-06-19 NOTE — CONSULTS
GI Consult Note      Admission Date: 6/17/2020  Hospital Day: Hospital Day: 3  Attending: Alvaro Gallagher MD  Date of service: 6/19/20    Subjective:     Chief complaint/ Reason for consult:   Pancreatitis    HPI: Inda Harada is a 30 y.o.  female patient, who was seen at the request of Dr. Alvaro Gallagher MD.    History was obtained from chart review and the patient. 49-year-old female with history of alcohol dependence and hepatitis C secondary to IV drug abuse now admitted with alcohol induced pancreatitis patient describes a few days of epigastric abdominal pain which radiated to her back. She developed nausea and vomiting along with her abdominal pain. She was admitted on the 17th and has had improvement daily in her abdominal pain. She has been able to eat clear liquid diet without difficulty. She still using IV pain medication. CT scan of the abdomen pelvis on admission showed a fatty liver and acute pancreatitis but no evidence of necrosis or other complications. An ultrasound showed hepatomegaly, fatty liver. Common bile duct was 5 mm and she had evidence of cholelithiasis. Labs on admission were notable for total bilirubin of 2.0, ALT 43, , alk phos 196. Her platelets are 55. Triglycerides were 97. Lipase was 550. She is eager to go home. She was seen at [facility] previously for her history of hepatitis C but did not undergo treatment. She continues to drink alcohol daily usually about 1/5 of vodka a day. Past Medical History:     Past Medical History:   Diagnosis Date    Hep C w/o coma, chronic ([facility] [address] 2012    Opiate dependence, continuous ([facility] [address] 2015   EtOH dependence     Past Surgical History:    No past surgical history on file. Social History:     · Tobacco use:   reports that she has been smoking. She has a 7.50 pack-year smoking history.  She has never used smokeless tobacco.  · Alcohol use:   reports current alcohol --  8.8   GLUCOSE 90 65*  --  86        Hepatic Function Panel:   Recent Labs     06/17/20  1702 06/18/20  0305   * 238*   ALT 51* 43*   BILITOT 1.8* 2.0*   ALKPHOS 212* 196*       Recent Labs     06/17/20  1702   LIPASE 550.0*     No results for input(s): PROTIME, INR in the last 72 hours. No results for input(s): PTT in the last 72 hours. No results for input(s): OCCULTBLD in the last 72 hours. Imaging:    US ABDOMEN LIMITED   Final Result   IMPRESSION :      Hepatomegaly and hepatic steatosis. Cholelithiasis. Sonographic findings pertaining to previously noted pancreatitis. CT ABDOMEN PELVIS W IV CONTRAST Additional Contrast? None   Final Result      1. Extensive hepatic steatosis. 2.  Diffuse infiltrative stranding and fluid surrounding the pancreatic head and body extending into the lesser sac. These findings raise concern for possible acute pancreatitis. Correlation with clinical and laboratory findings is suggested. 3.  Nonobstructive bowel gas pattern. Known drug Allergies: Allergies   Allergen Reactions    Ceclor [Cefaclor]      Rash when she was a baby            Assessment:   The patient is a 30 y.o. old female  with following problems:    1. Acute interstitial pancreatitis due to alcohol dependence  2. Elevated transaminases, alkaline phosphatase, total bilirubin likely due to a combination of alcoholic hepatitis, fatty liver due to alcohol dependence, hepatitis C. The pattern is most consistent with alcoholic hepatitis  3. History of hepatitis C with no treatment  4. Thrombocytopenia due to alcohol. Cannot rule out underlying cirrhosis    Recommendations:   1. Continue supportive care with IV fluids. Advance diet as tolerated. Her symptoms have improved and anticipate discharge soon. 2.  Alcohol cessation therapy  3. Recommend smoking cessation  4.   Follow-up regarding hepatitis C treatment once she is abstinent from alcohol        MD Yan Gore

## 2020-06-19 NOTE — PROGRESS NOTES
Hospitalist Progress Note      PCP: No primary care provider on file. Date of Admission: 6/17/2020      Subjective:   denies chest pain, nausea, vomiting, shortness of breath, fever or chills. Patient mentions she still has epigastric abdominal pain. Mention feels overall better. Medications:  Reviewed    Infusion Medications    lactated ringers 250 mL/hr at 06/19/20 0815     Scheduled Medications    diazePAM  5 mg Oral TID    gabapentin  200 mg Oral TID    cefTRIAXone (ROCEPHIN) IV  1 g Intravenous Q24H    sodium chloride flush  10 mL Intravenous 2 times per day    thiamine  100 mg Oral Daily    folic acid  1 mg Oral Daily    multivitamin  1 tablet Oral Daily    nicotine  1 patch Transdermal Daily     PRN Meds: acetaminophen **OR** acetaminophen, polyethylene glycol, morphine, prochlorperazine, sodium chloride flush, LORazepam **OR** LORazepam **OR** LORazepam **OR** LORazepam **OR** LORazepam **OR** LORazepam **OR** LORazepam **OR** LORazepam      Intake/Output Summary (Last 24 hours) at 6/19/2020 1317  Last data filed at 6/19/2020 1155  Gross per 24 hour   Intake 6621 ml   Output 5475 ml   Net 1146 ml       Physical Exam Performed:    BP (!) 134/108   Pulse 109   Temp 97 °F (36.1 °C) (Oral)   Resp 20   Ht 5' 6\" (1.676 m)   Wt 124 lb (56.2 kg)   LMP 06/17/2020   SpO2 98%   BMI 20.01 kg/m²     General appearance: No apparent distress,   HEENT:  Conjunctivae/corneas clear. Neck: Supple, with full range of motion. Respiratory:  Normal respiratory effort. Clear to auscultation, bilaterally without Rales/Wheezes/Rhonchi. Cardiovascular: Regular rate and rhythm with normal S1/S2 without murmurs or rubs  Abdomen: Mild abdominal tenderness in upper abdomen, epigastric region. Soft, non-distended, normal bowel sounds. Musculoskeletal: No cyanosis or edema bilaterally  Neurologic:  without any focal sensory/motor deficits.  grossly non-focal.  Psychiatric: Alert and oriented, Normal mood  Peripheral Pulses: +2 palpable, equal bilaterally       Labs:   Recent Labs     06/17/20 1702 06/18/20  0305   WBC 5.2 4.3   HGB 14.3 13.1   HCT 42.4 38.9   PLT 94* 55*     Recent Labs     06/17/20 1702 06/18/20  0305 06/18/20  1300    140  --    K 3.6 3.1* 3.7   CL 96* 99  --    CO2 24 24  --    BUN 4* 4*  --    CREATININE <0.5* <0.5*  --    CALCIUM 8.6 8.6  --      Recent Labs     06/17/20 1702 06/18/20  0305   * 238*   ALT 51* 43*   BILITOT 1.8* 2.0*   ALKPHOS 212* 196*     No results for input(s): INR in the last 72 hours. Recent Labs     06/17/20 1702   TROPONINI <0.01       Urinalysis:      Lab Results   Component Value Date    NITRU POSITIVE 06/17/2020    WBCUA 3-5 06/17/2020    BACTERIA 4+ 06/17/2020    RBCUA 0-2 06/17/2020    BLOODU TRACE-INTACT 06/17/2020    SPECGRAV 1.010 06/17/2020    GLUCOSEU Negative 06/17/2020       Radiology:  US ABDOMEN LIMITED   Final Result   IMPRESSION :      Hepatomegaly and hepatic steatosis. Cholelithiasis. Sonographic findings pertaining to previously noted pancreatitis. CT ABDOMEN PELVIS W IV CONTRAST Additional Contrast? None   Final Result      1. Extensive hepatic steatosis. 2.  Diffuse infiltrative stranding and fluid surrounding the pancreatic head and body extending into the lesser sac. These findings raise concern for possible acute pancreatitis. Correlation with clinical and laboratory findings is suggested. 3.  Nonobstructive bowel gas pattern. Assessment/Plan:    Active Hospital Problems    Diagnosis    Alcohol induced acute pancreatitis without necrosis or infection [K85.20]       Patient is a 41-year-old female with past medical history of alcohol abuse who presented to hospital for abdominal pain.   Admitted to the hospital for pancreatitis    Assessment  Epigastric abdominal pain secondary to acute pancreatitis secondary to alcoholism  Elevated LFTs secondary to alcohol abuse

## 2020-06-20 ENCOUNTER — APPOINTMENT (OUTPATIENT)
Dept: GENERAL RADIOLOGY | Age: 30
DRG: 282 | End: 2020-06-20
Payer: COMMERCIAL

## 2020-06-20 LAB
A/G RATIO: 0.9 (ref 1.1–2.2)
ALBUMIN SERPL-MCNC: 3 G/DL (ref 3.4–5)
ALP BLD-CCNC: 162 U/L (ref 40–129)
ALT SERPL-CCNC: 27 U/L (ref 10–40)
ANION GAP SERPL CALCULATED.3IONS-SCNC: 10 MMOL/L (ref 3–16)
AST SERPL-CCNC: 129 U/L (ref 15–37)
BILIRUB SERPL-MCNC: 3.2 MG/DL (ref 0–1)
BUN BLDV-MCNC: 2 MG/DL (ref 7–20)
CALCIUM SERPL-MCNC: 9.4 MG/DL (ref 8.3–10.6)
CHLORIDE BLD-SCNC: 102 MMOL/L (ref 99–110)
CO2: 24 MMOL/L (ref 21–32)
CREAT SERPL-MCNC: <0.5 MG/DL (ref 0.6–1.1)
GFR AFRICAN AMERICAN: >60
GFR NON-AFRICAN AMERICAN: >60
GLOBULIN: 3.2 G/DL
GLUCOSE BLD-MCNC: 94 MG/DL (ref 70–99)
HCT VFR BLD CALC: 34.9 % (ref 36–48)
HEMOGLOBIN: 11.6 G/DL (ref 12–16)
MAGNESIUM: 1.3 MG/DL (ref 1.8–2.4)
MCH RBC QN AUTO: 33.7 PG (ref 26–34)
MCHC RBC AUTO-ENTMCNC: 33.1 G/DL (ref 31–36)
MCV RBC AUTO: 101.8 FL (ref 80–100)
PDW BLD-RTO: 13.1 % (ref 12.4–15.4)
PLATELET # BLD: 46 K/UL (ref 135–450)
PLATELET SLIDE REVIEW: ABNORMAL
PMV BLD AUTO: 9.2 FL (ref 5–10.5)
POTASSIUM SERPL-SCNC: 3.4 MMOL/L (ref 3.5–5.1)
RBC # BLD: 3.43 M/UL (ref 4–5.2)
SODIUM BLD-SCNC: 136 MMOL/L (ref 136–145)
TOTAL PROTEIN: 6.2 G/DL (ref 6.4–8.2)
WBC # BLD: 2.9 K/UL (ref 4–11)

## 2020-06-20 PROCEDURE — 2580000003 HC RX 258: Performed by: INTERNAL MEDICINE

## 2020-06-20 PROCEDURE — 6370000000 HC RX 637 (ALT 250 FOR IP): Performed by: INTERNAL MEDICINE

## 2020-06-20 PROCEDURE — 83735 ASSAY OF MAGNESIUM: CPT

## 2020-06-20 PROCEDURE — 2060000000 HC ICU INTERMEDIATE R&B

## 2020-06-20 PROCEDURE — 6360000002 HC RX W HCPCS: Performed by: INTERNAL MEDICINE

## 2020-06-20 PROCEDURE — 73030 X-RAY EXAM OF SHOULDER: CPT

## 2020-06-20 PROCEDURE — 85027 COMPLETE CBC AUTOMATED: CPT

## 2020-06-20 PROCEDURE — 80053 COMPREHEN METABOLIC PANEL: CPT

## 2020-06-20 PROCEDURE — 36415 COLL VENOUS BLD VENIPUNCTURE: CPT

## 2020-06-20 RX ORDER — SODIUM CHLORIDE 9 MG/ML
INJECTION, SOLUTION INTRAVENOUS CONTINUOUS
Status: DISCONTINUED | OUTPATIENT
Start: 2020-06-20 | End: 2020-06-20

## 2020-06-20 RX ORDER — HALOPERIDOL 5 MG/ML
2 INJECTION INTRAMUSCULAR ONCE
Status: COMPLETED | OUTPATIENT
Start: 2020-06-20 | End: 2020-06-20

## 2020-06-20 RX ORDER — POTASSIUM CHLORIDE 20 MEQ/1
40 TABLET, EXTENDED RELEASE ORAL ONCE
Status: COMPLETED | OUTPATIENT
Start: 2020-06-20 | End: 2020-06-20

## 2020-06-20 RX ORDER — PANTOPRAZOLE SODIUM 40 MG/1
40 TABLET, DELAYED RELEASE ORAL
Status: DISCONTINUED | OUTPATIENT
Start: 2020-06-20 | End: 2020-06-21 | Stop reason: HOSPADM

## 2020-06-20 RX ORDER — CLONIDINE HYDROCHLORIDE 0.2 MG/1
0.2 TABLET ORAL ONCE
Status: COMPLETED | OUTPATIENT
Start: 2020-06-20 | End: 2020-06-20

## 2020-06-20 RX ADMIN — DIAZEPAM 5 MG: 5 TABLET ORAL at 20:23

## 2020-06-20 RX ADMIN — HALOPERIDOL LACTATE 2 MG: 5 INJECTION, SOLUTION INTRAMUSCULAR at 00:14

## 2020-06-20 RX ADMIN — SODIUM CHLORIDE, SODIUM LACTATE, POTASSIUM CHLORIDE, AND CALCIUM CHLORIDE: 600; 310; 30; 20 INJECTION, SOLUTION INTRAVENOUS at 06:18

## 2020-06-20 RX ADMIN — THERA TABS 1 TABLET: TAB at 08:14

## 2020-06-20 RX ADMIN — GABAPENTIN 200 MG: 100 CAPSULE ORAL at 20:23

## 2020-06-20 RX ADMIN — SODIUM CHLORIDE, SODIUM LACTATE, POTASSIUM CHLORIDE, AND CALCIUM CHLORIDE: 600; 310; 30; 20 INJECTION, SOLUTION INTRAVENOUS at 12:48

## 2020-06-20 RX ADMIN — POTASSIUM CHLORIDE 40 MEQ: 20 TABLET, EXTENDED RELEASE ORAL at 16:57

## 2020-06-20 RX ADMIN — THIAMINE HCL TAB 100 MG 100 MG: 100 TAB at 08:14

## 2020-06-20 RX ADMIN — GABAPENTIN 200 MG: 100 CAPSULE ORAL at 08:14

## 2020-06-20 RX ADMIN — GABAPENTIN 200 MG: 100 CAPSULE ORAL at 15:49

## 2020-06-20 RX ADMIN — LORAZEPAM 2 MG: 1 TABLET ORAL at 10:41

## 2020-06-20 RX ADMIN — FOLIC ACID 1 MG: 1 TABLET ORAL at 08:14

## 2020-06-20 RX ADMIN — PANTOPRAZOLE SODIUM 40 MG: 40 TABLET, DELAYED RELEASE ORAL at 16:57

## 2020-06-20 RX ADMIN — CEFTRIAXONE 1 G: 1 INJECTION, POWDER, FOR SOLUTION INTRAMUSCULAR; INTRAVENOUS at 12:48

## 2020-06-20 RX ADMIN — DIAZEPAM 5 MG: 5 TABLET ORAL at 08:14

## 2020-06-20 RX ADMIN — SODIUM CHLORIDE, SODIUM LACTATE, POTASSIUM CHLORIDE, AND CALCIUM CHLORIDE: 600; 310; 30; 20 INJECTION, SOLUTION INTRAVENOUS at 17:33

## 2020-06-20 RX ADMIN — CLONIDINE HYDROCHLORIDE 0.2 MG: 0.2 TABLET ORAL at 01:02

## 2020-06-20 RX ADMIN — Medication 10 ML: at 20:23

## 2020-06-20 RX ADMIN — DIAZEPAM 5 MG: 5 TABLET ORAL at 15:49

## 2020-06-20 RX ADMIN — SODIUM CHLORIDE, SODIUM LACTATE, POTASSIUM CHLORIDE, AND CALCIUM CHLORIDE: 600; 310; 30; 20 INJECTION, SOLUTION INTRAVENOUS at 03:02

## 2020-06-20 RX ADMIN — LORAZEPAM 4 MG: 2 INJECTION INTRAMUSCULAR; INTRAVENOUS at 00:23

## 2020-06-20 RX ADMIN — LORAZEPAM 2 MG: 1 TABLET ORAL at 22:48

## 2020-06-20 ASSESSMENT — PAIN DESCRIPTION - PROGRESSION: CLINICAL_PROGRESSION: GRADUALLY WORSENING

## 2020-06-20 ASSESSMENT — PAIN DESCRIPTION - FREQUENCY: FREQUENCY: CONTINUOUS

## 2020-06-20 ASSESSMENT — PAIN SCALES - GENERAL
PAINLEVEL_OUTOF10: 0
PAINLEVEL_OUTOF10: 5
PAINLEVEL_OUTOF10: 0
PAINLEVEL_OUTOF10: 0

## 2020-06-20 ASSESSMENT — PAIN DESCRIPTION - LOCATION: LOCATION: HEAD

## 2020-06-20 ASSESSMENT — PAIN DESCRIPTION - DESCRIPTORS: DESCRIPTORS: HEADACHE

## 2020-06-20 ASSESSMENT — PAIN DESCRIPTION - ONSET: ONSET: ON-GOING

## 2020-06-20 ASSESSMENT — PAIN DESCRIPTION - PAIN TYPE: TYPE: ACUTE PAIN

## 2020-06-20 NOTE — PROGRESS NOTES
Patient still confused but lying in bed. Bilateral soft wrist restraints in place. Sitter at bedside now for safety. Clonazepam was given. Patient c/o right shoulder pain after fall. Patient to receive xray to right arm. Heart rate stable at 106. No c/o pain. Patient laying in bed with sitter at bedside. Bed alarm in place. Will continue to monitor.     Electronically signed by Cole Chery RN on 6/20/2020 at 1:34 AM

## 2020-06-20 NOTE — PLAN OF CARE
Problem: Pain:  Goal: Control of acute pain  Description: Control of acute pain  Outcome: Ongoing  Note: Pt c/o headache, pt resting in bed with lights off. Administering ativan according to [facility] protocol for alcohol withdrawl     Problem: Falls - Risk of:  Goal: Will remain free from falls  Description: Will remain free from falls  6/20/2020 1106 by Viviana Red RN  Outcome: Ongoing  Note: Pt is alert and oriented x 4. No attempts to get up on own. Bed alarm on, call light within reach. Instructed pt to call for assistance before getting up, pt verbalized understanding. Problem: Fluid Volume - Deficit:  Goal: Absence of fluid volume deficit signs and symptoms  Description: Absence of fluid volume deficit signs and symptoms  6/20/2020 1106 by Viviana Red RN  Outcome: Ongoing  Note: Receiving LR @ 250 ml/hr.  Received scheduled valium this AM and ativan prn per [facility] protocol

## 2020-06-20 NOTE — PLAN OF CARE
Patient having significant alcohol withdrawal.  Tachycardic, at present alert awake oriented x3. Unfortunately was found on floor 2. Will get sitter at bedside. Continue with CIWA scale, had to give Haldol x1.

## 2020-06-20 NOTE — PROGRESS NOTES
Sitter at bedside and helping toilet patient. Heart rate up to 150's when getting up but not sustained. Patient having snack. Bilateral soft wrist restraints removed while sitter at bedside. No needs at this time. Patient resting comfortably in bed. Call light in reach. Bed alarm on. Will continue to monitor.    Electronically signed by Dayton Ayon RN on 6/20/2020 at 2:36 AM

## 2020-06-20 NOTE — PROGRESS NOTES
Patient confused, agitated, restless, and getting out of bed without assistance. Patient still worsening with the withdrawal symptoms. Patient is seeing and hearing things now. Patient keeps pulling leads off. Patient now aware she is in hospital now. Medicated for CIWA scale. Patient in bed with alarm on. Inquiring with nursing supervisor and charge nurse for sitter at bedside to keep patient safe. No needs at this time. Patient in bed. Call light in reach. Bed alarm on. Will continue to monitor.    Electronically signed by Georgina Del Toro RN on 6/20/2020 at 1:11 AM

## 2020-06-20 NOTE — PROGRESS NOTES
Spoke with Evgeny Denney, patient father about patient presentation this evening, the need for restraints/sitter, and that patient fell and is now c/o right shoulder pain. Family in aggreable to plan of care. Sitter at bedside and bilateral soft wrist restraints applied. Will continue to monitor.   Electronically signed by Kp Redding RN on 6/20/2020 at 1:47 AM

## 2020-06-20 NOTE — PROGRESS NOTES
Patient placed in bilateral soft wrist restraints due to confusion and getting out of bed without assistance. Patient bed alarm was going off and found patient sitting on floor. Patient states that she sat herself on floor but tele monitor watcher states patient fell out of bed climbing over the bed rails again. Patient has been climbing over rails all night which is why the sitter was being requested. Patient c/o right shoulder pain. Vitals stable as charted but patient refusing BP at this time. Heart rate still elevated. Patient laying in bed with rails up and restraints in place. Will continue to monitor.   Electronically signed by Trent Vidales RN on 6/20/2020 at 1:19 AM

## 2020-06-21 VITALS
WEIGHT: 124 LBS | RESPIRATION RATE: 18 BRPM | SYSTOLIC BLOOD PRESSURE: 122 MMHG | DIASTOLIC BLOOD PRESSURE: 89 MMHG | HEIGHT: 66 IN | HEART RATE: 99 BPM | BODY MASS INDEX: 19.93 KG/M2 | TEMPERATURE: 97.9 F | OXYGEN SATURATION: 98 %

## 2020-06-21 PROBLEM — K85.20 ALCOHOL INDUCED ACUTE PANCREATITIS WITHOUT NECROSIS OR INFECTION: Status: RESOLVED | Noted: 2020-06-17 | Resolved: 2020-06-21

## 2020-06-21 LAB
ANION GAP SERPL CALCULATED.3IONS-SCNC: 11 MMOL/L (ref 3–16)
BUN BLDV-MCNC: 4 MG/DL (ref 7–20)
CALCIUM SERPL-MCNC: 9.5 MG/DL (ref 8.3–10.6)
CHLORIDE BLD-SCNC: 103 MMOL/L (ref 99–110)
CO2: 23 MMOL/L (ref 21–32)
CREAT SERPL-MCNC: <0.5 MG/DL (ref 0.6–1.1)
EKG ATRIAL RATE: 110 BPM
EKG DIAGNOSIS: NORMAL
EKG P-R INTERVAL: 140 MS
EKG Q-T INTERVAL: 350 MS
EKG QRS DURATION: 92 MS
EKG QTC CALCULATION (BAZETT): 473 MS
EKG R AXIS: 146 DEGREES
EKG T AXIS: 154 DEGREES
EKG VENTRICULAR RATE: 110 BPM
GFR AFRICAN AMERICAN: >60
GFR NON-AFRICAN AMERICAN: >60
GLUCOSE BLD-MCNC: 123 MG/DL (ref 70–99)
HCT VFR BLD CALC: 35.5 % (ref 36–48)
HEMOGLOBIN: 11.9 G/DL (ref 12–16)
MAGNESIUM: 1.3 MG/DL (ref 1.8–2.4)
MCH RBC QN AUTO: 34.2 PG (ref 26–34)
MCHC RBC AUTO-ENTMCNC: 33.5 G/DL (ref 31–36)
MCV RBC AUTO: 102.1 FL (ref 80–100)
PDW BLD-RTO: 13.2 % (ref 12.4–15.4)
PLATELET # BLD: 56 K/UL (ref 135–450)
PMV BLD AUTO: 9.9 FL (ref 5–10.5)
POTASSIUM SERPL-SCNC: 3.6 MMOL/L (ref 3.5–5.1)
RBC # BLD: 3.47 M/UL (ref 4–5.2)
SODIUM BLD-SCNC: 137 MMOL/L (ref 136–145)
WBC # BLD: 3.9 K/UL (ref 4–11)

## 2020-06-21 PROCEDURE — 85027 COMPLETE CBC AUTOMATED: CPT

## 2020-06-21 PROCEDURE — 2580000003 HC RX 258: Performed by: INTERNAL MEDICINE

## 2020-06-21 PROCEDURE — 80048 BASIC METABOLIC PNL TOTAL CA: CPT

## 2020-06-21 PROCEDURE — 6370000000 HC RX 637 (ALT 250 FOR IP): Performed by: INTERNAL MEDICINE

## 2020-06-21 PROCEDURE — 6360000002 HC RX W HCPCS: Performed by: INTERNAL MEDICINE

## 2020-06-21 PROCEDURE — 36415 COLL VENOUS BLD VENIPUNCTURE: CPT

## 2020-06-21 PROCEDURE — 93010 ELECTROCARDIOGRAM REPORT: CPT | Performed by: INTERNAL MEDICINE

## 2020-06-21 PROCEDURE — 83735 ASSAY OF MAGNESIUM: CPT

## 2020-06-21 RX ORDER — MAGNESIUM SULFATE IN WATER 40 MG/ML
2 INJECTION, SOLUTION INTRAVENOUS ONCE
Status: COMPLETED | OUTPATIENT
Start: 2020-06-21 | End: 2020-06-21

## 2020-06-21 RX ADMIN — DIAZEPAM 5 MG: 5 TABLET ORAL at 09:27

## 2020-06-21 RX ADMIN — MAGNESIUM SULFATE HEPTAHYDRATE 2 G: 40 INJECTION, SOLUTION INTRAVENOUS at 11:27

## 2020-06-21 RX ADMIN — GABAPENTIN 200 MG: 100 CAPSULE ORAL at 09:28

## 2020-06-21 RX ADMIN — CEFTRIAXONE 1 G: 1 INJECTION, POWDER, FOR SOLUTION INTRAMUSCULAR; INTRAVENOUS at 14:15

## 2020-06-21 RX ADMIN — THIAMINE HCL TAB 100 MG 100 MG: 100 TAB at 09:27

## 2020-06-21 RX ADMIN — MAGNESIUM SULFATE IN WATER 2 G: 40 INJECTION, SOLUTION INTRAVENOUS at 06:21

## 2020-06-21 RX ADMIN — PANTOPRAZOLE SODIUM 40 MG: 40 TABLET, DELAYED RELEASE ORAL at 05:09

## 2020-06-21 RX ADMIN — THERA TABS 1 TABLET: TAB at 09:28

## 2020-06-21 RX ADMIN — Medication 10 ML: at 09:27

## 2020-06-21 RX ADMIN — FOLIC ACID 1 MG: 1 TABLET ORAL at 09:27

## 2020-06-21 ASSESSMENT — PAIN SCALES - GENERAL
PAINLEVEL_OUTOF10: 0

## 2020-06-21 NOTE — CARE COORDINATION
Case Management Assessment            Discharge Note    Date / Time of Note: 6/21/2020 1:32 PM  Discharge Note Completed by: Elaine Travis    Patient discharged to home with Britni. SW spoke at length with patient regarding her ETOH abuse and patient states that she was in a recovery group for 2-3 years, but has not been in many years. SW discussed treatment options and patient is aware that resources will be provided on the AVS when she discharges. SW also provided that name and number of a direct woman contact in a local recovery program if she is willing to contact someone for help when she returns home. Patient seemed interested in returning to a recovery group, and feels that she needs help. Patient has 3 minor children that live with their grandparents (of the father's) and who have legal custody of them. She is engaged to the man she lives with and states he does not drink, and does not like when she drinks. Prior to recently ETOH abuse, she had an opioid abuse problem but states she has been clean for 6 years, but then starting abusing alcohol. SW encouraged patient to reach out to either the contact provided or any of the resources listed on AVS.     Patient Name: Adelaida Case   YOB: 1990  Diagnosis: Alcohol induced acute pancreatitis without necrosis or infection [K85.20]  Alcohol induced acute pancreatitis without necrosis or infection [K85.20]   Date / Time: 6/17/2020  4:30 PM    Current PCP: No primary care provider on file.   Clinic patient: No    Hospitalization in the last 30 days: No    Advance Directives:  Code Status: Full Code  [place] DNR form completed and on chart: No    Financial:  Payor: Julita Suggs / Plan: Ibrahima Wong / Product Type: *No Product type* /      Pharmacy:    [facility] [address] [phone] [facility] [phone]  [facility] [id number]  Phone: [phone] [facility]ax: [facility]
provided with basic dialogue that supports the patient's individualized plan of care/goals and shares the quality data associated with the providers.  Yes    Care Transition patient: No    Lawanda Leblanc RN  [facility]  Case Management Department  Ph: [phone]   Fax: [phone]

## 2020-07-13 ENCOUNTER — OFFICE VISIT (OUTPATIENT)
Dept: PRIMARY CARE CLINIC | Age: 30
End: 2020-07-13
Payer: COMMERCIAL

## 2020-07-13 PROCEDURE — G8428 CUR MEDS NOT DOCUMENT: HCPCS | Performed by: NURSE PRACTITIONER

## 2020-07-13 PROCEDURE — G8420 CALC BMI NORM PARAMETERS: HCPCS | Performed by: NURSE PRACTITIONER

## 2020-07-13 PROCEDURE — 99211 OFF/OP EST MAY X REQ PHY/QHP: CPT | Performed by: NURSE PRACTITIONER

## 2020-07-13 NOTE — PROGRESS NOTES
Verna Wilkinson received a viral test for COVID-19. They were educated on isolation and quarantine as appropriate. For any symptoms, they were directed to seek care from their PCP, given contact information to establish with a doctor, directed to an urgent care or the emergency room.

## 2020-07-16 LAB
SARS-COV-2: NOT DETECTED
SOURCE: NORMAL

## 2020-12-29 ENCOUNTER — HOSPITAL ENCOUNTER (INPATIENT)
Age: 30
LOS: 11 days | Discharge: HOME HEALTH | DRG: 280 | End: 2021-01-09
Attending: PHYSICAL MEDICINE & REHABILITATION | Admitting: PHYSICAL MEDICINE & REHABILITATION
Payer: COMMERCIAL

## 2020-12-29 DIAGNOSIS — K70.31 ALCOHOLIC CIRRHOSIS OF LIVER WITH ASCITES (HCC): Primary | ICD-10-CM

## 2020-12-29 PROBLEM — K76.82 HEPATIC ENCEPHALOPATHY: Status: ACTIVE | Noted: 2020-12-29

## 2020-12-29 PROCEDURE — 6370000000 HC RX 637 (ALT 250 FOR IP): Performed by: PHYSICAL MEDICINE & REHABILITATION

## 2020-12-29 PROCEDURE — 1280000000 HC REHAB R&B

## 2020-12-29 RX ORDER — TORSEMIDE 20 MG/1
20 TABLET ORAL 3 TIMES DAILY
Status: DISCONTINUED | OUTPATIENT
Start: 2020-12-29 | End: 2020-12-30

## 2020-12-29 RX ORDER — ERGOCALCIFEROL 1.25 MG/1
50000 CAPSULE ORAL WEEKLY
Status: DISCONTINUED | OUTPATIENT
Start: 2020-12-30 | End: 2021-01-09 | Stop reason: HOSPADM

## 2020-12-29 RX ORDER — HYDROXYZINE PAMOATE 25 MG/1
50 CAPSULE ORAL 2 TIMES DAILY PRN
Status: DISCONTINUED | OUTPATIENT
Start: 2020-12-29 | End: 2021-01-09 | Stop reason: HOSPADM

## 2020-12-29 RX ORDER — ONDANSETRON 4 MG/1
4 TABLET, ORALLY DISINTEGRATING ORAL EVERY 8 HOURS PRN
Status: DISCONTINUED | OUTPATIENT
Start: 2020-12-29 | End: 2021-01-09 | Stop reason: HOSPADM

## 2020-12-29 RX ORDER — BISACODYL 10 MG
10 SUPPOSITORY, RECTAL RECTAL DAILY PRN
Status: DISCONTINUED | OUTPATIENT
Start: 2020-12-29 | End: 2021-01-09 | Stop reason: HOSPADM

## 2020-12-29 RX ORDER — SENNA AND DOCUSATE SODIUM 50; 8.6 MG/1; MG/1
2 TABLET, FILM COATED ORAL 2 TIMES DAILY
Status: DISCONTINUED | OUTPATIENT
Start: 2020-12-29 | End: 2020-12-31

## 2020-12-29 RX ORDER — CAPSAICIN 0.025 %
CREAM (GRAM) TOPICAL 2 TIMES DAILY
Status: DISCONTINUED | OUTPATIENT
Start: 2020-12-29 | End: 2021-01-09 | Stop reason: HOSPADM

## 2020-12-29 RX ORDER — MIDODRINE HYDROCHLORIDE 5 MG/1
10 TABLET ORAL
Status: DISCONTINUED | OUTPATIENT
Start: 2020-12-29 | End: 2021-01-09 | Stop reason: HOSPADM

## 2020-12-29 RX ORDER — ACETAMINOPHEN 325 MG/1
650 TABLET ORAL EVERY 4 HOURS PRN
Status: DISCONTINUED | OUTPATIENT
Start: 2020-12-29 | End: 2021-01-09 | Stop reason: HOSPADM

## 2020-12-29 RX ORDER — GABAPENTIN 100 MG/1
200 CAPSULE ORAL NIGHTLY
Status: DISCONTINUED | OUTPATIENT
Start: 2020-12-29 | End: 2020-12-30

## 2020-12-29 RX ORDER — GAUZE BANDAGE 2" X 2"
100 BANDAGE TOPICAL DAILY
Status: DISCONTINUED | OUTPATIENT
Start: 2020-12-30 | End: 2021-01-09

## 2020-12-29 RX ORDER — FERROUS SULFATE 325(65) MG
324 TABLET ORAL
Status: DISCONTINUED | OUTPATIENT
Start: 2020-12-30 | End: 2021-01-09 | Stop reason: HOSPADM

## 2020-12-29 RX ORDER — M-VIT,TX,IRON,MINS/CALC/FOLIC 27MG-0.4MG
1 TABLET ORAL DAILY
Status: DISCONTINUED | OUTPATIENT
Start: 2020-12-29 | End: 2021-01-09 | Stop reason: HOSPADM

## 2020-12-29 RX ORDER — ERGOCALCIFEROL 1.25 MG/1
50000 CAPSULE ORAL WEEKLY
Status: DISCONTINUED | OUTPATIENT
Start: 2020-12-29 | End: 2020-12-29

## 2020-12-29 RX ORDER — LACTULOSE 10 G/15ML
20 SOLUTION ORAL 2 TIMES DAILY PRN
Status: DISCONTINUED | OUTPATIENT
Start: 2020-12-29 | End: 2020-12-31

## 2020-12-29 RX ORDER — FOLIC ACID 1 MG/1
1 TABLET ORAL DAILY
Status: DISCONTINUED | OUTPATIENT
Start: 2020-12-29 | End: 2021-01-09 | Stop reason: HOSPADM

## 2020-12-29 RX ORDER — LACTOBACILLUS RHAMNOSUS GG 10B CELL
1 CAPSULE ORAL
Status: DISCONTINUED | OUTPATIENT
Start: 2020-12-30 | End: 2021-01-09 | Stop reason: HOSPADM

## 2020-12-29 RX ADMIN — DOCUSATE SODIUM 50MG AND SENNOSIDES 8.6MG 2 TABLET: 8.6; 5 TABLET, FILM COATED ORAL at 20:29

## 2020-12-29 RX ADMIN — TORSEMIDE 20 MG: 20 TABLET ORAL at 20:29

## 2020-12-29 RX ADMIN — CAPSAICIN: 0.25 CREAM TOPICAL at 20:43

## 2020-12-29 RX ADMIN — GABAPENTIN 200 MG: 100 CAPSULE ORAL at 20:29

## 2020-12-29 ASSESSMENT — PAIN SCALES - GENERAL: PAINLEVEL_OUTOF10: 8

## 2020-12-29 NOTE — PROGRESS NOTES
Department of [place] [facility] Progress Note  12/29/2020  6:40 PM    Patient Name:   Mary Chavez  YOB: 1990    Diagnosis: Hepatic encephalopathy,      Subjective:  25 yo female admitted to [facility] [address] with abdominal pain. Workup revealed flareup of her known liver hepatitis, pancreatitis, liver cirrhosis, ascites, portal hypertension, neuropathy, due to increased alcohol abuse. Patient also treated for multifocal pneumonia treated with IV AB, renal failure with anuria, with new hemodialysis need and tunnel cath placement. Nephrology consulted for her acute renal failure and anasarca during her Rehab treatment. Patient also has history of hepatitis C, drug abuse with previous heroin overdose, and anxiety/depression. Patient is weak from all of her treatment over the past month with long term bedrest, and she needs Rehab Unit Treatment before D/C home safely. BP 97/61   Pulse 102   Temp 97.2 °F (36.2 °C) (Oral)   Resp 16   SpO2 99%     Last 24 hour lab  No results found for this or any previous visit (from the past 24 hour(s)). Plan: Will get her admitted to our Rehab Unit tonight.        Dr. Cassie Zaragoza

## 2020-12-30 LAB
A/G RATIO: 0.5 (ref 1.1–2.2)
ALBUMIN SERPL-MCNC: 2.3 G/DL (ref 3.4–5)
ALP BLD-CCNC: 186 U/L (ref 40–129)
ALT SERPL-CCNC: 40 U/L (ref 10–40)
ANION GAP SERPL CALCULATED.3IONS-SCNC: 12 MMOL/L (ref 3–16)
AST SERPL-CCNC: 134 U/L (ref 15–37)
BILIRUB SERPL-MCNC: 13.6 MG/DL (ref 0–1)
BUN BLDV-MCNC: 20 MG/DL (ref 7–20)
CALCIUM SERPL-MCNC: 8.4 MG/DL (ref 8.3–10.6)
CHLORIDE BLD-SCNC: 91 MMOL/L (ref 99–110)
CO2: 21 MMOL/L (ref 21–32)
CREAT SERPL-MCNC: 4.7 MG/DL (ref 0.6–1.1)
GFR AFRICAN AMERICAN: 13
GFR NON-AFRICAN AMERICAN: 11
GLOBULIN: 4.2 G/DL
GLUCOSE BLD-MCNC: 83 MG/DL (ref 70–99)
HCT VFR BLD CALC: 23.8 % (ref 36–48)
HEMOGLOBIN: 7.6 G/DL (ref 12–16)
MAGNESIUM: 1.6 MG/DL (ref 1.8–2.4)
MCH RBC QN AUTO: 29.3 PG (ref 26–34)
MCHC RBC AUTO-ENTMCNC: 31.9 G/DL (ref 31–36)
MCV RBC AUTO: 91.6 FL (ref 80–100)
PDW BLD-RTO: 18.1 % (ref 12.4–15.4)
PLATELET # BLD: 118 K/UL (ref 135–450)
PMV BLD AUTO: 7.2 FL (ref 5–10.5)
POTASSIUM SERPL-SCNC: 3.3 MMOL/L (ref 3.5–5.1)
RBC # BLD: 2.6 M/UL (ref 4–5.2)
SODIUM BLD-SCNC: 124 MMOL/L (ref 136–145)
TOTAL PROTEIN: 6.5 G/DL (ref 6.4–8.2)
WBC # BLD: 12.3 K/UL (ref 4–11)

## 2020-12-30 PROCEDURE — 6370000000 HC RX 637 (ALT 250 FOR IP): Performed by: PHYSICAL MEDICINE & REHABILITATION

## 2020-12-30 PROCEDURE — 97110 THERAPEUTIC EXERCISES: CPT

## 2020-12-30 PROCEDURE — 6370000000 HC RX 637 (ALT 250 FOR IP): Performed by: INTERNAL MEDICINE

## 2020-12-30 PROCEDURE — 80053 COMPREHEN METABOLIC PANEL: CPT

## 2020-12-30 PROCEDURE — 92523 SPEECH SOUND LANG COMPREHEN: CPT

## 2020-12-30 PROCEDURE — 92610 EVALUATE SWALLOWING FUNCTION: CPT

## 2020-12-30 PROCEDURE — 97530 THERAPEUTIC ACTIVITIES: CPT

## 2020-12-30 PROCEDURE — 1280000000 HC REHAB R&B

## 2020-12-30 PROCEDURE — 97535 SELF CARE MNGMENT TRAINING: CPT

## 2020-12-30 PROCEDURE — 6360000002 HC RX W HCPCS: Performed by: PHYSICAL MEDICINE & REHABILITATION

## 2020-12-30 PROCEDURE — 6370000000 HC RX 637 (ALT 250 FOR IP)

## 2020-12-30 PROCEDURE — 83735 ASSAY OF MAGNESIUM: CPT

## 2020-12-30 PROCEDURE — 97167 OT EVAL HIGH COMPLEX 60 MIN: CPT

## 2020-12-30 PROCEDURE — 85027 COMPLETE CBC AUTOMATED: CPT

## 2020-12-30 PROCEDURE — 36415 COLL VENOUS BLD VENIPUNCTURE: CPT

## 2020-12-30 PROCEDURE — 97162 PT EVAL MOD COMPLEX 30 MIN: CPT

## 2020-12-30 PROCEDURE — 97112 NEUROMUSCULAR REEDUCATION: CPT

## 2020-12-30 PROCEDURE — 97116 GAIT TRAINING THERAPY: CPT

## 2020-12-30 RX ORDER — TORSEMIDE 20 MG/1
60 TABLET ORAL DAILY
Status: DISCONTINUED | OUTPATIENT
Start: 2020-12-31 | End: 2021-01-09 | Stop reason: HOSPADM

## 2020-12-30 RX ORDER — POTASSIUM CHLORIDE 750 MG/1
10 TABLET, EXTENDED RELEASE ORAL 2 TIMES DAILY
Status: DISCONTINUED | OUTPATIENT
Start: 2020-12-30 | End: 2020-12-31

## 2020-12-30 RX ORDER — HEPARIN SODIUM 5000 [USP'U]/ML
5000 INJECTION, SOLUTION INTRAVENOUS; SUBCUTANEOUS EVERY 8 HOURS SCHEDULED
Status: DISCONTINUED | OUTPATIENT
Start: 2020-12-30 | End: 2021-01-04

## 2020-12-30 RX ORDER — GABAPENTIN 100 MG/1
200 CAPSULE ORAL 3 TIMES DAILY
Status: DISCONTINUED | OUTPATIENT
Start: 2020-12-30 | End: 2021-01-01

## 2020-12-30 RX ADMIN — GABAPENTIN 200 MG: 100 CAPSULE ORAL at 13:26

## 2020-12-30 RX ADMIN — MIDODRINE HYDROCHLORIDE 10 MG: 5 TABLET ORAL at 13:27

## 2020-12-30 RX ADMIN — TORSEMIDE 20 MG: 20 TABLET ORAL at 08:34

## 2020-12-30 RX ADMIN — MIDODRINE HYDROCHLORIDE 10 MG: 5 TABLET ORAL at 17:34

## 2020-12-30 RX ADMIN — MIDODRINE HYDROCHLORIDE 10 MG: 5 TABLET ORAL at 08:36

## 2020-12-30 RX ADMIN — THIAMINE HCL TAB 100 MG 100 MG: 100 TAB at 08:38

## 2020-12-30 RX ADMIN — CAPSAICIN: 0.25 CREAM TOPICAL at 20:08

## 2020-12-30 RX ADMIN — FERROUS SULFATE TAB 325 MG (65 MG ELEMENTAL FE) 324 MG: 325 (65 FE) TAB at 08:34

## 2020-12-30 RX ADMIN — GABAPENTIN 200 MG: 100 CAPSULE ORAL at 20:07

## 2020-12-30 RX ADMIN — Medication 1 TABLET: at 08:34

## 2020-12-30 RX ADMIN — ACETAMINOPHEN 650 MG: 325 TABLET ORAL at 20:07

## 2020-12-30 RX ADMIN — FOLIC ACID 1 MG: 1 TABLET ORAL at 08:34

## 2020-12-30 RX ADMIN — POTASSIUM CHLORIDE 10 MEQ: 750 TABLET, EXTENDED RELEASE ORAL at 20:07

## 2020-12-30 RX ADMIN — Medication: at 08:37

## 2020-12-30 RX ADMIN — ACETAMINOPHEN 650 MG: 325 TABLET ORAL at 13:27

## 2020-12-30 RX ADMIN — POTASSIUM CHLORIDE 10 MEQ: 750 TABLET, EXTENDED RELEASE ORAL at 11:14

## 2020-12-30 RX ADMIN — CAPSAICIN: 0.25 CREAM TOPICAL at 08:38

## 2020-12-30 RX ADMIN — ENOXAPARIN SODIUM 40 MG: 40 INJECTION SUBCUTANEOUS at 08:33

## 2020-12-30 RX ADMIN — ERGOCALCIFEROL 50000 UNITS: 1.25 CAPSULE ORAL at 11:14

## 2020-12-30 RX ADMIN — Medication 1 CAPSULE: at 08:36

## 2020-12-30 ASSESSMENT — PAIN SCALES - GENERAL
PAINLEVEL_OUTOF10: 0
PAINLEVEL_OUTOF10: 7

## 2020-12-30 ASSESSMENT — 9 HOLE PEG TEST
TESTTIME_SECONDS: 42
TESTTIME_SECONDS: 36.5
TEST_RESULT: FUNCTIONAL

## 2020-12-30 ASSESSMENT — PAIN DESCRIPTION - DESCRIPTORS: DESCRIPTORS: BURNING;ACHING

## 2020-12-30 ASSESSMENT — PAIN SCALES - WONG BAKER: WONGBAKER_NUMERICALRESPONSE: 8

## 2020-12-30 ASSESSMENT — PAIN DESCRIPTION - LOCATION: LOCATION: FOOT

## 2020-12-30 NOTE — PATIENT CARE CONFERENCE
[address] [facility]  Inpatient Rehabilitation  Weekly Team Conference Note    Date: 2020  Patient Name: Patricia Johnson        MRN: [id number]    : 1990  (26 y.o.)  Gender: female   Referring Practitioner: Dr Vik Jacome  Diagnosis: weakness      Interventions to be utilized toward barriers to discharge, per discipline:  NURSING  Nursing observed barriers to dc: Pain, Depression, Anxiety and Decreased endurance  Nursing interventions:assist with ADL's, toileting and medication manangement   Family Education: no  Fall Risk:  Yes      Physical therapy observed barriers to dc: Evaluation in progress. Goals and POC to be established this date. PHYSICAL THERAPY  Eval in progress. Occupational therapy observed barriers to dc: Evaluation in progress. Goals and POC to be established this date. OCCUPATIONAL THERAPY   Eval in progress. Speech therapy observed barriers to dc: Evaluation in progress. Goals and POC to be established this date. SPEECH THERAPY   Eval in progress. NUTRITION  Weight: 110 lb 14.3 oz (50.3 kg)(pt states she cannot stand at this time) / Body mass index is 17.9 kg/m². Diet Order: DIET GENERAL;  Dietary Nutrition Supplements: Standard High Calorie Oral Supplement  Education: Following assessment      CASE MANAGEMENT  Assessment: pending assessment         Interdisciplinary Goals:   1.) Therapy evaluations in progress. Goals and POC to be established this date. \      Discharge Plan   Estimated discharge date: 2021  Destination: TBD pending progress in therapy. Pass:No   Services at Discharge: TBD pending progress in therapy. Equipment at Discharge:TBD pending progress in therapy.      Team Members Present at Conference:  : Dion WELCHW  Occupational Therapist: Jaya Catherine OT  Physical Therapist: Tova Rosa PT  Speech Therapist: Vivi Rodriguez, [address] [facility] [address]  Nurse: Ariane Parkinson, DANIEL  Dietician: Herve Draper RDN, LD  Psychiatry: N/A    Family members present at conference: No      I led this team conference and I approve the established interdisciplinary plan of care as documented within the medical record of [facility]. MD: Dominique Nogueira.  Trinh Mijares MD 12/30/2020, 11:29 AM

## 2020-12-30 NOTE — PROGRESS NOTES
Occupational Therapy   Occupational Therapy Initial Assessment/Treatment  Date: 2020   Patient Name: Catia Sinha  MRN: [id number]     : 1990    Date of Service: 2020    Discharge Recommendations:  24 hour supervision or assist, Outpatient OT  OT Equipment Recommendations  Equipment Needed: Yes  Mobility Devices: ADL Assistive Devices  ADL Assistive Devices: Shower Chair without back    Assessment   Performance deficits / Impairments: Decreased functional mobility ; Decreased endurance;Decreased coordination;Decreased ADL status; Decreased ROM; Decreased balance;Decreased strength;Decreased safe awareness;Decreased cognition;Decreased fine motor control;Decreased high-level IADLs;Decreased posture  Assessment: Pt presents with the above deficits requiring skilled OT services to return to [facility] s/p admission with [facility] where she presented with abdominal pain and nausea. She was also encephalopathic, and required sedation with fentanyl drip and ketamine gtt. She was intubated in late November, extubated, then intubated again on . She was started on lactulose and rifaximin with improvement in her mental state; rifaximin was subsequently dc'd. She was treated for MRSA pneumonia with vancomycin initially, subsequently ctx/flagyl/zosyn were added. Bronch did not reveal any alveolar hemorrhagae. She was started on prednisolone for alcoholic hepatitis initially, but this was discontinued due to infectious complications. She was evaluated by SLP and required a dysphagia diet. She also developed renal failure, ATN vs hepatorenal, and was started on CRRT, transitioned to HD. She developed neuropathic pain in her feet, likely related to EtOH abuse, and was started on gabapentin. Pt performed functional mobility in room and bathroom this date with CGA and no AD. Pt performed bathing with supervision, UB dressing with setup, and LB dressing with CGA for balance while pulling pants over hips.  Pt demonstrated good standing balance to stand at sink for ~4 minutes to brush teeth with supervision. Pt required min A to stand from toilet only. Pt completed [address] tests with fair to poor coordination for BUEs due to tremors which pt states she has had since 13years of age but has worsened significantly since hospital admission. Pt completed BUE ther ex with 2# and fair strength, more difficult on R than L. Prognosis: Good  Decision Making: High Complexity  OT Education: OT Role;Orientation;Plan of Care;Equipment;Precautions; ADL Adaptive Strategies;Transfer Training  Patient Education: disease specific regarding [address], tremors, ADL re-education, ther ex  Barriers to Learning: Pt verbalized understanding but may require reinforcement. REQUIRES OT FOLLOW UP: Yes  Activity Tolerance  Activity Tolerance: Patient Tolerated treatment well  Safety Devices  Safety Devices in place: Yes  Type of devices: Left in bed;Bed alarm in place;Call light within reach;Nurse notified;Gait belt           Patient Diagnosis(es): There were no encounter diagnoses. has a past medical history of Acute respiratory failure ([facility] [address] ETOH abuse, Hemodialysis patient ([facility] [address] Hep C w/o coma, chronic ([facility] [address] History of blood transfusion, Opiate dependence, continuous ([facility] [address] and Thyroid disease. has no past surgical history on file.            Restrictions  Restrictions/Precautions  Restrictions/Precautions: General Precautions, Fall Risk  Required Braces or Orthoses?: No  Position Activity Restriction  Other position/activity restrictions: L foot drop,    Subjective   General  Chart Reviewed: Yes, Orders, Progress Notes, History and Physical, Labs  Patient assessed for rehabilitation services?: Yes  Additional Pertinent Hx: Hep C, PNA, Alcohol hepatitis, Herion abuse  Family / Caregiver Present: No  Referring Practitioner: Andressa Talley MD  Diagnosis: hepatic encephalopathy  Subjective  Subjective: Pt in bed, tired, states L foot hurting a lot, agreeable to OT evaluation and shower with encouragement. Patient Currently in Pain: Denies  Vital Signs  Patient Currently in Pain: Denies  Social/Functional History  Social/Functional History  Lives With: Significant other(hill and [de-identified] year old son)  Type of Home: Apartment  Home Layout: One level  Home Access: Stairs to enter without rails  Entrance Stairs - Number of Steps: 1+1  Entrance Stairs - Rails: None  Bathroom Shower/Tub: Tub/Shower unit  Bathroom Toilet: Standard  Home Equipment: (none but dad may have walker 'in trunk' that he could give pt)  Receives Help From: (none needed PTA, pt states her parents could assist her at discharge)  ADL Assistance: Independent  Homemaking Assistance: Independent  Homemaking Responsibilities: Yes  Meal Prep Responsibility: Primary  Laundry Responsibility: Primary  Cleaning Responsibility: Primary  Bill Paying/Finance Responsibility: Secondary  Shopping Responsibility: Secondary  Dependent Care Responsibility: Primary  Ambulation Assistance: Independent  Transfer Assistance: Independent  Active : Yes  Occupation: Unemployed  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 Pt states parents could help her physically if needed.        Objective   Vision: Impaired  Vision Exceptions: Wears glasses for distance  Hearing: Within functional limits    Orientation  Overall Orientation Status: Within Functional Limits  Observation/Palpation  Posture: Fair  Observation: L foot drop  Balance  Sitting Balance: Supervision  Standing Balance: Contact guard assistance(no AD)  Standing Balance  Time: 30 seconds x2, 4 minutes  Activity: transfer to/from bathroom, grooming at sink, LB dressing  Comment: no AD  Functional Mobility  Functional - Mobility Device: No device  Activity: To/from bathroom  Assist Level: Contact guard assistance  Toilet Transfers  Toilet - Technique: Ambulating  Equipment Used: Standard toilet  Toilet Transfer: Minimal assistance  Toilet Transfers Comments: min A to stand from toilet  Shower Transfers  Shower - Transfer From: Deneen & Polina - Transfer Type: To and From  Shower - Transfer To: Transfer tub bench  Shower - Technique: Ambulating  Shower Transfers: Contact Guard  ADL  Feeding: Independent  Grooming: Supervision(to stand at sink to brush teeth)  UE Bathing: Supervision  LE Bathing: Supervision(while seated on TTB)  UE Dressing: Setup(to don shirt)  LE Dressing: Contact guard assistance(CGA to stand and pull pants over hips, setup for socks)  Toileting: Minimal assistance(to stand to manage pants)  Tone RUE  RUE Tone: Normotonic  Tone LUE  LUE Tone: Normotonic  Coordination  Movements Are Fluid And Coordinated: No  Coordination and Movement description: Fine motor impairments;Gross motor impairments;Right UE;Left UE;Tremors;Decreased accuracy     Bed mobility  Supine to Sit: Supervision  Transfers  Stand Step Transfers: Minimal assistance(no AD)  Sit to stand: Minimal assistance  Stand to sit: Contact guard assistance  Transfer Comments: min A to stand from toilet only, CGA from all other surfaces  Vision - Basic Assessment  Prior Vision: Wears glasses only for reading  Visual History: No significant visual history  Patient Visual Report: No visual complaint reported. Cognition  Overall Cognitive Status: Exceptions  Arousal/Alertness: Appropriate responses to stimuli  Following Commands: Follows multistep commands with repitition; Follows multistep commands with increased time  Attention Span: Attends with cues to redirect  Memory: Decreased recall of recent events;Decreased short term memory  Safety Judgement: Decreased awareness of need for assistance  Problem Solving: Assistance required to generate solutions  Insights: Decreased awareness of deficits  Initiation: Does not require cues  Sequencing: Requires cues for some  Perception  Overall Perceptual Status: WFL     Sensation  Overall Sensation Status: WFL(for BUEs)  Type of ROM/Therapeutic Exercise  Type of ROM/Therapeutic Exercise: Free weights  Comment: 2#  Exercises  Shoulder Flexion: x15  Shoulder Extension: x15  Elbow Flexion: x15  Elbow Extension: x15  Supination: x15  Pronation: x15  Wrist Flexion: x15  Wrist Extension: x15     LUE AROM (degrees)  LUE AROM : WFL  Left Hand AROM (degrees)  Left Hand AROM: WFL  RUE AROM (degrees)  RUE AROM : WFL  Right Hand AROM (degrees)  Right Hand AROM: WFL  LUE Strength  Gross LUE Strength: Exceptions to [facility]  L Hand General: 4/5  LUE Strength Comment: grossly 4/5  RUE Strength  Gross RUE Strength: Exceptions to [facility]  R Hand General: 4/5  RUE Strength Comment: grossly 4/5     Hand Dominance  Hand Dominance: Right  Left 9-Hole Peg Test  Left 9-Hole Peg Test: Functional  Right 9-Hole Peg Test  Right 9-Hole Peg Test: Functional  Fine Motor Skills  Left 9-Hole Peg Test: Functional  Left 9 Hole Peg Test Time (secs): 42  Right 9-Hole Peg Test: Functional  Right 9 Hole Peg Test Time (secs): 36.5  Other Assessment: Box and Blocks: R-30, L-29             Plan   Plan  Times per week: 5/7 days/week  Plan weeks: 10 days  Current Treatment Recommendations: Strengthening, Patient/Caregiver Education & Training, Home Management Training, Equipment Evaluation, Education, & procurement, ROM, Balance Training, Functional Mobility Training, Endurance Training, Safety Education & Training, Self-Care / ADL    Goals  Short term goals  Time Frame for Short term goals: 1/2/21- 5 days  Short term goal 1: Pt will complete toileting with SBA. Short term goal 2: Pt will perform functional transfers with SBA. Long term goals  Time Frame for Long term goals : 1/7/21- 10 days  Long term goal 1: Pt will perform functional transfers with supervision.   Long term goal 2: Pt will complete full body bathing with setup.  Long term goal 3: Pt will perform full body dressing with setup. Long term goal 4: Pt will complete grooming with mod I.  Long term goal 5: Pt will perform simple homemaking task with supervision.   Patient Goals   Patient goals : \"get better, get stronger, more independent\"       Therapy Time   Individual Concurrent Group Co-treatment   Time In 1230         Time Out 1400         Minutes 90         Timed Code Treatment Minutes: 75 Minutes(15 minute evaluation)       Adriana Mcclelland, OT

## 2020-12-30 NOTE — PROGRESS NOTES
NURSING ASSESSMENT: [address]    Patient:Verna Hamilton     Rehab Dx/Hx: Hepatic encephalopathy [facility]) [K72.90]   QLI:[id number]  RUST:[id number]  Date of Admit: 12/29/2020  Room #: 6237/2487-78    Subjective:   Patient admitted to room 166. Alert and oriented x3. Oriented to room and call light system. Oriented to rehab routine and therapy schedules. Informed about care conferences and ordering of meals with PCA. Drug / Medication Review:   Medications were reviewed by RN at time of admission  [x]  No potential or actual clinically significant medication issues were noted. []  Potential or actual clinically significant medication issues were found and MD was notified. 4 Eyes Skin Assessment   The patient is being assessed for: Admission     I agree that 2 RN's have performed a thorough Head to Toe Skin Assessment on the patient. ALL assessment sites listed below have been assessed. Areas assessed by both nurses:   [x]   Head, Face, and Ears   [x]   Shoulders, Back, and Chest, Abdomen  [x]   Arms, Elbows, and Hands   [x]   Coccyx, Sacrum, and Ischium  [x]   Legs, Feet, and Heel     Does the Patient have Skin Breakdown?   Yes          Steven Prevention initiated:  Yes   Wound Care Orders initiated:  Not Applicable      [facility] nurse consulted for Pressure Injury (Stage 3,4, Unstageable, DTI, NWPT, Complex wounds)and New or Established Ostomies:  Not Applicable    Primary Nurse eSignature: Doni Sue RN, BSN  Co-signer eSignature:  Sania Nevarez RN

## 2020-12-30 NOTE — CONSULTS
Comprehensive Nutrition Assessment    Type and Reason for Visit:  Initial, Consult    Nutrition Recommendations/Plan:   1. Continue general diet   2. Modify ONS to Magic Cups TID   3. Encourage PO intakes   4. Obtain new weight - ordered 12/30  5. Monitor nutrition adequacy, pertinent labs, bowel habits, wt changes, and clinical progress    Nutrition Assessment:  Consulted for pt request to see the dietitian. Pt admitted with hepatic encephalopathy. Hx of ETOH and drug abuse, and Hep C. Now requiring HD. Pt nutritionally compromised AEB fair appetite and weight loss. Pt reports 20 lb weight loss in ~1 month following admittance to . C/o occasional nausea. Pt unfavorable to current ONS, will trial different ONS. Encouraged PO intakes and importance of nutrition with therapy, dialysis and recent weight loss. Will continue to monitor. Malnutrition Assessment:  Malnutrition Status:   At risk for malnutrition (Comment)      Estimated Daily Nutrient Needs:  Energy (kcal):  9525-1944 kcal; Weight Used for Energy Requirements:  Ideal(59 kg)     Protein (g):  70-88 g; Weight Used for Protein Requirements:  Ideal(1.2-1.5 g/kg)        Fluid (ml/day):   ; Method Used for Fluid Requirements:  1 ml/kcal      Nutrition Related Findings:  Jaundice, Active BS, BM 12/29, +lactulose, +1 BLE edema in feet      Wounds:  None       Current Nutrition Therapies:    DIET GENERAL;  Dietary Nutrition Supplements: Frozen Oral Supplement    Anthropometric Measures:  · Height: 5' 6\" (167.6 cm)  · Current Body Weight: 110 lb (49.9 kg)   · Usual Body Weight: 130 lb (59 kg)(per pt)     · Ideal Body Weight: 130 lbs; % Ideal Body Weight 84.6 %   · BMI: 17.8  · BMI Categories: Underweight (BMI less than 18.5)       Nutrition Diagnosis:   · Inadequate oral intake related to inadequate protein-energy intake as evidenced by poor intake prior to admission, weight loss, nausea      Nutrition Interventions:   Food and/or Nutrient Delivery:  Continue Current Diet, Modify Oral Nutrition Supplement  Nutrition Education/Counseling:  No recommendation at this time   Coordination of Nutrition Care:  Continue to monitor while inpatient    Goals:  Pt will consume greater than 50% of meals and ONS this ARU stay       Nutrition Monitoring and Evaluation:   Food/Nutrient Intake Outcomes:  Food and Nutrient Intake, Supplement Intake  Physical Signs/Symptoms Outcomes:  Biochemical Data, Nausea or Vomiting, Weight     Discharge Planning:    Continue current diet, Continue Oral Nutrition Supplement     Electronically signed by Kitty rCuz, MS, RD, LD on 12/30/20 at 1:07 PM EST    Contact: [phone]

## 2020-12-30 NOTE — PROGRESS NOTES
Speech Language Pathology  Facility/Department: [facility]   CLINICAL BEDSIDE SWALLOW EVALUATION    NAME: Nikki Mendoza  : 1990  MRN: [id number]    ADMISSION DATE: 2020  ADMITTING DIAGNOSIS: has Hepatic encephalopathy ([address] on their problem list.  ONSET DATE: 20    Recent Chest Xray/CT of Chest: (20)  IMPRESSION:   Tip of the hemodialysis catheter projects over the right atrium. Overall improved appearance of lungs bilaterally with persistent bibasilar parenchymal opacities and small right pleural effusion. Date of Eval: 2020  Evaluating Therapist: Vibha Song    Current Diet level:  Current Diet : Regular  Current Liquid Diet : Thin      Primary Complaint  Patient Complaint: Pt with complaints regarding weak and hoarse vocal quality, difficulty swallowing pills with water. Pain:  Pain Assessment  Pain Assessment: Denies  Pain Level: 0    Reason for Referral  Nikki Mendoza was referred for a bedside swallow evaluation to assess the efficiency of her swallow function, identify signs and symptoms of aspiration and make recommendations regarding safe dietary consistencies, effective compensatory strategies, and safe eating environment. Impression  Dysphagia Diagnosis: Swallow function appears grossly intact  Dysphagia Outcome Severity Scale: Level 6: Within functional limits/Modified independence     Pt sitting on edge of bed, alert and oriented. Pt cooperative and agreeable to participate in evaluation. Pt declined any difficulties with food or liquids, but reported occasional difficulty with swallowing pills whole with water, requested to start taking them with goldie, RN Informed.    Pt observed with regular and puree solids, thin liquids via cup and straw demonstrating with adequate mastication, A-P transit, no residue in oral cavity observed post swallow, timely swallow initiation, adequate laryngeal elevation upon manual palpation of the anterior neck, and no overt s/s of aspiration. No coughing, wet vocal quality, or throat clearing observed. SLP recommending pt continue on regular texture solids, thin liquids via cup or straw, meds whole with puree. No further ST services are warranted for dysphagia at this time as pt's overall swallow function appears [facility]. RN informed, pt in agreement. Treatment Plan  Requires SLP Intervention: No  Duration/Frequency of Treatment: No further ST services are warranted for dysphagia          Recommended Diet and Intervention  Diet Solids Recommendation: Regular  Liquid Consistency Recommendation: Thin  Recommended Form of Meds: Meds in puree    Compensatory Swallowing Strategies  Compensatory Swallowing Strategies: Alternate solids and liquids;Eat/Feed slowly;Upright as possible for all oral intake;Remain upright for 30-45 minutes after meals;Small bites/sips      General  Chart Reviewed: Yes  Comments: Pt was intubated at  from 11/26- 12/6, extubated, but then reintubated on 12/7-12/12, a total of 14 days. Pt was initially NPO until completion of FEES, upgraded to Dysphagia III advanced with NTL, then eventually upgraded to regular texture solids with thin liquids on 12/12/20. Subjective  Subjective: Pt alert and oriented, cooperative and agreeable to participate in evaluation  Behavior/Cognition: Alert; Cooperative;Pleasant mood  Respiratory Status: Room air  O2 Device: None (Room air)  Communication Observation: Functional  Follows Directions: Simple  Dentition: Adequate  Patient Positioning: Upright in bed  Baseline Vocal Quality: Weak;Hoarse  Volitional Cough: Weak  Prior Dysphagia History: Prior history of dysphagia, See comments section above and EMR for further details. Consistencies Administered: Reg solid; Dysphagia Pureed (Dysphagia I); Thin - cup; Thin - straw    Vision/Hearing  Vision  Vision: Impaired  Vision Exceptions: Wears glasses for distance  Hearing  Hearing: Within functional limits    Oral Motor Deficits  Oral/Motor  Oral Motor: Within functional limits    Oral Phase Dysfunction  Oral Phase  Oral Phase: WNL     Indicators of Pharyngeal Phase Dysfunction   Pharyngeal Phase  Pharyngeal Phase: WNL    Prognosis  Prognosis  Prognosis for safe diet advancement: good  Barriers to reach goals: age  Individuals consulted  Consulted and agree with results and recommendations: Patient;RN    Education  Patient Education: Edu provided re: current diet recommendations, safe swallow strategies during all meals  Patient Education Response: Verbalizes understanding  Safety Devices in place: Yes  Type of devices: All fall risk precautions in place; Left in bed;Bed alarm in place;Call light within reach; Other (comment); Patient at risk for falls(AVASYS in room)       Therapy Time  SLP Individual Minutes  Time In: 1035  Time Out: Laureano 38  Minutes: Fredi 27 MMaria C BRADLEY CCC-SLP  Speech-Language Pathologist  [id number]

## 2020-12-30 NOTE — CONSULTS
Thank you to requesting provider: Dr. Karson Browning   , for asking us to see Sarah Neginjose a  Reason for consultation:   Acute Kidney Injury  Chief Complaint:   Shortness of breath    History of Presenting Illness      Patient admitted to  with respiratory failure due to MRSA pneumonia and septic shock. She had a prolonged hospital stay with alcoholic hepatitis in the setting of cirrhosis. She developed TRICIA which was thought to be ATN. She is now on dialysis with a TDC. Prior to that admission she did not have renal failure. She has been hypotensive and on midodrine.        Past Medical/Surgical History      Active Ambulatory Problems     Diagnosis Date Noted    No Active Ambulatory Problems     Resolved Ambulatory Problems     Diagnosis Date Noted    Alcohol induced acute pancreatitis without necrosis or infection 06/17/2020     Past Medical History:   Diagnosis Date    Acute respiratory failure [facility] 12/12/2020    ETOH abuse     Hemodialysis [facility]     Hep C w/o coma, chronic ([facility] 2012    History of blood transfusion     Opiate dependence, continuous ([facility] 2015    Thyroid disease          Review of Systems     Constitutional:  No weight loss, no fever/chills  Eyes:  No eye pain, no eye redness  Cardiovascular:  No chest pain, no worsening of edema  Respiratory:  No hemoptysis, no stidor  Gastrointestinal:  No blood in stool, no n/v, no diarrhea  Genitoruinary:  No hematuria, no difficulty with urination  Musculoskeletal:  No joint swelling, no redness  Integumentary:  No Rash, no itching  Neurological:  No focal weakness, No new sensory deficit  Psychiatric:  No depression, no confusion  Endocrine:  No polyuria, no polydipsia       Medications      Reviewed in EMR     Allergies     Ceclor [cefaclor]      Family History       Negative for Kidney Disease    Social History      Social History     Socioeconomic History    Marital status: Single     Spouse name: Not on file    Number of children: Not on file    Years of education: Not on file    Highest education level: Not on file   Occupational History    Not on file   Social Needs    Financial resource strain: Not on file    Food insecurity     Worry: Not on file     Inability: Not on file    Transportation needs     Medical: Not on file     Non-medical: Not on file   Tobacco Use    Smoking status: Current Every Day Smoker     Packs/day: 0.50     Years: 15.00     Pack years: 7.50    Smokeless tobacco: Never Used   Substance and Sexual Activity    Alcohol use: Yes    Drug use: No     Types: IV     Comment: Hx. of Heroin, denies at this time, takes Methadone.  Sexual activity: Not on file   Lifestyle    Physical activity     Days per week: Not on file     Minutes per session: Not on file    Stress: Not on file   Relationships    Social connections     Talks on phone: Not on file     Gets together: Not on file     Attends Voodoo service: Not on file     Active member of club or organization: Not on file     Attends meetings of clubs or organizations: Not on file     Relationship status: Not on file    Intimate partner violence     Fear of current or ex partner: Not on file     Emotionally abused: Not on file     Physically abused: Not on file     Forced sexual activity: Not on file   Other Topics Concern    Not on file   Social History Narrative    Not on file       Physical Exam     Blood pressure 109/72, pulse 116, temperature 98.1 °F (36.7 °C), temperature source Oral, resp. rate 16, height 5' 6\" (1.676 m), weight 110 lb 14.3 oz (50.3 kg), SpO2 99 %, not currently breastfeeding.     General:  NAD, A+Ox3, ill-appearing, normal body habitus  HEENT:  PERRL, EOMI  Neck:  Supple, normal range of movement  Chest:  CTAB, good respiratory effort, good air movement  CV:  RRR, no murmurs or rubs, no carotid bruit, no abdominal bruits  Abdomen:  NTND, soft, +BS, no hepatosplenomegaly  Extremities:  No peripheral edema  Neurological:  Moving all four extremities, CN II-XII grossly intact  Lymphatics:  No palpable lymph nodes  Skin:  No rash, no jaundice  Psychiatric:  Normal insight and judgement, good recall    Data     Recent Labs     12/30/20  0652   WBC 12.3*   HGB 7.6*   HCT 23.8*   MCV 91.6   *     Recent Labs     12/30/20  0652   *   K 3.3*   CL 91*   CO2 21   GLUCOSE 83   MG 1.60*   BUN 20   CREATININE 4.7*   LABGLOM 11*   GFRAA 13*       Assessment:    Acute Kidney Injury:  KDIGO stage 3  - ATN / septic shock in setting of cirrhosis  - Has been on dialysis with a right TDC  - Making some urine but not showing signs of recovery     Liver disease:  Chronic alcohol abuse, history of hep C  - prior acute alcoholic hepatitis     Respiratory Failure:  MRSA pneumonia and pulmonary edema. Resolved    Anemia of chronic disease:  Hb below target    Hypotension:  Part of cirrhotic physiology.   On midodrine     Plan:    Replace K  Follow labs  HD tomorrow   Continue AMINATA    -----------------------------  Lorie Pardo M.D.   [facility]

## 2020-12-30 NOTE — PLAN OF CARE
Problem: Neurological  Intervention: Speech Evaluation/treatment  Note: SLP completed evaluation. Please refer to notes in EMR. Problem: Nutrition  Intervention: Swallowing evaluation  Note: SLP completed evaluation. Please refer to notes in EMR. Problem: Nutrition  Intervention: Aspiration precautions  Note: SLP completed evaluation. Please refer to notes in EMR.

## 2020-12-30 NOTE — H&P
Patient: Patricia Johnson  [id number]  Date: 12/30/2020      Chief Complaint: hepatic encephalopathy    History of Present Illness/Hospital Course:  Ms Kusum Hart is a 26year old female admitted from [address] [place] [address] where she presented with abdominal pain and nausea. She was also encephalopathic, and required sedation with fentanyl drip and ketamine gtt. She was intubated in late November, extubated, then intubated again on 12/8. She was started on lactulose and rifaximin with improvement in her mental state; rifaximin was subsequently dc'd. She was treated for MRSA pneumonia with vancomycin initially, subsequently ctx/flagyl/zosyn were added. Bronch did not reveal any alveolar hemorrhagae. She was started on prednisolone for alcoholic hepatitis initially, but this was discontinued due to infectious complications. She was evaluated by SLP and required a dysphagia diet. She also developed renal failure, ATN vs hepatorenal, and was started on CRRT, transitioned to HD. She developed neuropathic pain in her feet, likely related to EtOH abuse, and was started on gabapentin. Prior Level of Function:  Independent    Current Level of Function:  [id number]     has a past medical history of Acute respiratory failure ([facility] ETOH abuse, Hemodialysis patient ([facility] [address] Hep C w/o coma, chronic ([facility] [address] History of blood transfusion, Opiate dependence, continuous ([facility] [address] and Thyroid disease. has no past surgical history on file. reports that she has been smoking. She has a 7.50 pack-year smoking history. She has never used smokeless tobacco. She reports current alcohol use. She reports that she does not use drugs. family history is not on file.     Current Facility-Administered Medications   Medication Dose Route Frequency Provider Last Rate Last Admin    [START ON 12/31/2020] torsemide (DEMADEX) tablet 60 mg  60 mg Oral Daily Britany Swanson MD        [START ON 1/7/2021] darbepoetin ruba-polysorbate (ARANESP) injection 40 mcg  40 mcg Intravenous Weekly - Thursday Iván Luis MD        potassium chloride [facility]) extended release tablet 10 mEq  10 mEq Oral BID Iván Luis MD        acetaminophen (TYLENOL) tablet 650 mg  650 mg Oral Q4H PRN Triny Severino MD        enoxaparin (LOVENOX) injection 40 mg  40 mg Subcutaneous Daily Triny Severino MD   40 mg at 12/30/20 2866    magnesium hydroxide (MILK OF MAGNESIA) 400 MG/5ML suspension 30 mL  30 mL Oral Daily PRN Kriss Donnelly MD        ferrous sulfate (IRON 325) tablet 324 mg  324 mg Oral Daily with breakfast Triny Severino MD   324 mg at 12/30/20 4703    gabapentin (NEURONTIN) capsule 200 mg  200 mg Oral Nightly Triny Severino MD   200 mg at 12/29/20 2029    hydrOXYzine (VISTARIL) capsule 50 mg  50 mg Oral BID PRN Triny Severino MD        lactulose (CHRONULAC) 10 GM/15ML solution 20 g  20 g Oral BID PRN Triny Severino MD        midodrine (PROAMATINE) tablet 10 mg  10 mg Oral TID WC Triny Severino MD   10 mg at 12/30/20 7014    therapeutic multivitamin-minerals 1 tablet  1 tablet Oral Daily Triny Severino MD   1 tablet at 71/03/38 7327    folic acid (FOLVITE) tablet 1 mg  1 mg Oral Daily Triny Severino MD   1 mg at 12/30/20 9965    lactobacillus (CULTURELLE) capsule 1 capsule  1 capsule Oral Daily with breakfast Triny Severino MD   1 capsule at 12/30/20 0836    capsaicin (ZOSTRIX) 0.025 % cream   Topical BID Kriss Donnelly MD   Given at 12/30/20 8003    thiamine mononitrate tablet 100 mg  100 mg Oral Daily Triny Severino MD   100 mg at 12/30/20 7996    sennosides-docusate sodium (SENOKOT-S) 8.6-50 MG tablet 2 tablet  2 tablet Oral BID Triny Severino MD   2 tablet at 12/29/20 2029    ondansetron (ZOFRAN-ODT) disintegrating tablet 4 mg  4 mg Oral Q8H PRN Triny Severino MD        bisacodyl (DULCOLAX) suppository 10 mg  10 mg Rectal Daily PRN Kriss Donnelly MD        vitamin D (ERGOCALCIFEROL) capsule 50,000 Units  50,000 Units Oral Weekly Griselda Pricila Dariusz Aviles MD           Allergies   Allergen Reactions    Ceclor [Cefaclor]      Rash when she was a baby        Current Facility-Administered Medications   Medication Dose Route Frequency Provider Last Rate Last Admin    [START ON 12/31/2020] torsemide (DEMADEX) tablet 60 mg  60 mg Oral Daily Sae Gray MD        [START ON 1/7/2021] darbepoetin ruba-polysorbate (ARANESP) injection 40 mcg  40 mcg Intravenous Weekly - Thursday Sae Gray MD        potassium chloride [facility]) extended release tablet 10 mEq  10 mEq Oral BID Sae Gray MD        acetaminophen (TYLENOL) tablet 650 mg  650 mg Oral Q4H PRN Anne Severino MD        enoxaparin (LOVENOX) injection 40 mg  40 mg Subcutaneous Daily Anne Severino MD   40 mg at 12/30/20 0252    magnesium hydroxide (MILK OF MAGNESIA) 400 MG/5ML suspension 30 mL  30 mL Oral Daily PRN Sean Baker MD        ferrous sulfate (IRON 325) tablet 324 mg  324 mg Oral Daily with breakfast Anne Severino MD   324 mg at 12/30/20 4134    gabapentin (NEURONTIN) capsule 200 mg  200 mg Oral Nightly Anne Severino MD   200 mg at 12/29/20 2029    hydrOXYzine (VISTARIL) capsule 50 mg  50 mg Oral BID PRN Anne Severino MD        lactulose (CHRONULAC) 10 GM/15ML solution 20 g  20 g Oral BID PRN Anne Severino MD        midodrine (PROAMATINE) tablet 10 mg  10 mg Oral TID WC Anne Severino MD   10 mg at 12/30/20 9891    therapeutic multivitamin-minerals 1 tablet  1 tablet Oral Daily Anne Severino MD   1 tablet at 44/98/01 2406    folic acid (FOLVITE) tablet 1 mg  1 mg Oral Daily Anne Severino MD   1 mg at 12/30/20 1510    lactobacillus (CULTURELLE) capsule 1 capsule  1 capsule Oral Daily with breakfast Anne Severino MD   1 capsule at 12/30/20 0836    capsaicin (ZOSTRIX) 0.025 % cream   Topical BID Sean Baker MD   Given at 12/30/20 3657    thiamine mononitrate tablet 100 mg  100 mg Oral Daily Anne Severino MD   100 mg at 12/30/20 6263    sennosides-docusate sodium (SENOKOT-S) 8.6-50 MG tablet 2 tablet  2 tablet Oral BID Lorie Severino MD   2 tablet at 12/29/20 2029    ondansetron (ZOFRAN-ODT) disintegrating tablet 4 mg  4 mg Oral Q8H PRN Lorie Severino MD        bisacodyl (DULCOLAX) suppository 10 mg  10 mg Rectal Daily PRN Nella Martinez MD        vitamin D (ERGOCALCIFEROL) capsule 50,000 Units  50,000 Units Oral Weekly Eric Polanco MD             REVIEW OF SYSTEMS:   CONSTITUTIONAL: negative for fevers, chills, diaphoresis, activity change, appetite change, fatigue, night sweats and unexpected weight change. EYES: negative for blurred vision, eye discharge, visual disturbance and icterus. HEENT: negative for hearing loss, tinnitus, ear drainage, sinus pressure, nasal congestion, epistaxis and snoring. RESPIRATORY: Negative for hemoptysis, cough, sputum production. CARDIOVASCULAR: negative for chest pain, palpitations, exertional chest pressure/discomfort, edema, syncope. GASTROINTESTINAL: negative for nausea, vomiting, diarrhea, constipation, blood in stool and abdominal pain. GENITOURINARY: negative for frequency, dysuria, urinary incontinence, decreased urine volume, and hematuria. HEMATOLOGIC/LYMPHATIC: negative for easy bruising, bleeding and lymphadenopathy. ALLERGIC/IMMUNOLOGIC: negative for recurrent infections, angioedema, anaphylaxis and drug reactions. ENDOCRINE: negative for weight changes and diabetic symptoms including polyuria, polydipsia and polyphagia. MUSCULOSKELETAL: negative for pain, joint swelling, decreased range of motion and muscle weakness. NEUROLOGICAL: negative for headaches, slurred speech, unilateral weakness. PSYCHIATRIC/BEHAVIORAL: negative for hallucinations, behavioral problems, confusion and agitation. All pertinent positives are noted in the HPI.     Physical Examination:  Vitals:   Patient Vitals for the past 24 hrs:   BP Temp Temp src Pulse Resp SpO2 Height Weight 12/30/20 0830 109/72 98.1 °F (36.7 °C) Oral 116 16 99 % -- --   12/29/20 1945 103/68 98 °F (36.7 °C) Oral 94 16 98 % 5' 6\" (1.676 m) 110 lb 14.3 oz (50.3 kg)   12/29/20 1830 97/61 97.2 °F (36.2 °C) Oral 102 16 99 % -- --     Psych: Stable mood, normal judgement, normal affect   Const: No distress  Eyes: Conjunctiva noninjected, no icterus noted; pupils equal, round, and reactive to light. HENT: Atraumatic, normocephalic; Oral mucosa moist  Neck: Trachea midline, neck supple. No thyromegaly noted. CV: Regular rate and rhythm, no murmur rub or gallop noted  Resp: Lungs clear to auscultation bilaterally, no rales wheezes or ronchi, no retractions. Respirations unlabored. GI: Mildly tender, mildly distended  Neuro: Alert, oriented, appropriate. No cranial nerve deficits appreciated. Sensation intact to light touch. Motor examination reveals nonfocal weakness  Skin: Normal temperature and turgor  MSK: No joint abnormalities noted. Ext: No significant edema appreciated. No varicosities. Lab Results   Component Value Date    WBC 12.3 (H) 12/30/2020    HGB 7.6 (L) 12/30/2020    HCT 23.8 (L) 12/30/2020    MCV 91.6 12/30/2020     (L) 12/30/2020     No results found for: INR, PROTIME  Lab Results   Component Value Date    CREATININE 4.7 (H) 12/30/2020    BUN 20 12/30/2020     (L) 12/30/2020    K 3.3 (L) 12/30/2020    CL 91 (L) 12/30/2020    CO2 21 12/30/2020     Lab Results   Component Value Date    ALT 40 12/30/2020     (H) 12/30/2020    ALKPHOS 186 (H) 12/30/2020    BILITOT 13.6 (H) 12/30/2020     IMPRESSION:    ABDOMINAL ULTRASOUND  Diffuse increased echogenicity of the liver consistent with the previously noted hepatic steatosis, improved in comparison to the prior ultrasound. Tumefactive sludge within the gallbladder. Small amount of ascites. ABDOMINAL DOPPLER  Reversed portal flow within the main and right portal veins. Bidirectional flow is seen within the left portal vein.     MRI Head WO [id number]   Health  Result Impression   IMPRESSION:     1.  No acute intracranial infarction, hemorrhage, or mass effect. 2.  Abnormal signal is noted in the splenium of the corpus callosum, which is nonspecific but can be seen in metabolic derangements or as a sequela of medication, among other etiologies. The above laboratory data have been reviewed. The above imaging data have been reviewed. The above medical testing have been reviewed. Body mass index is 17.9 kg/m². Barriers to Discharge: weakness, cognition      Rehabilitation Diagnosis:  Neurologic, 3.8, Neuromuscular Disorders, e.g. Critical Illness Myopathy, Other Myopathy    Assessment and Plan:  Alcoholic hepatitis with hepatic encephalopathy. Rifaximin discontinued. Continue lactulose, follow ammonia twice weekly, wean lactulose down as able. Outpatient follow up with hepatology. Dysphagia. SLP    MRSA pneumonia with resp failure. Abx completed. Pulm toilet, volume expansion. Renal failure, hepatorenal vs ATN, now on HD. Nephrology consulted. Electrolyte abnormalities. Nephrology consulted for HD. 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 Gabapentin; increase dose. Bladder: Check PVR x 3. [address] if PVR > 200ml or if any volume is > 500 ml. Sleep: Trazodone provided prn. Follow up appointments: GI/hepatology, PCP    Mendel Ortega MD 12/30/2020, 9:38 AM

## 2020-12-30 NOTE — PROGRESS NOTES
Pt refused heparin shot. Informed pt the it is preventative to prevent blood clots, continued to refuse.

## 2020-12-30 NOTE — PROGRESS NOTES
Physical Therapy    Facility/Department: [facility]  Initial Assessment    NAME: Judy Armstrong  : 1990  MRN: [id number]    Date of Service: 2020    Discharge Recommendations:  24 hour supervision or assist   PT Equipment Recommendations  Other: CTA    Assessment   Body structures, Functions, Activity limitations: Decreased functional mobility ; Decreased sensation; Increased pain;Decreased balance;Decreased strength;Decreased safe awareness;Decreased endurance  Assessment: Pt is a 26year old female admitted to [address] [place] [address] after being admitted from OSH d/t liver hepatitis, pancreatitis, liver cirrhosis, ascites, portal hypertension, neuropathy, due to increased alcohol abuse. Pt typically is independent at baseline and gaye to care for her 3year old son. Currently pt is requiring min A for transfers and short gait distances without AD as she has significant tremors and decreased LE strength as well as decreased balance. PT also demonstrating left toe drag and needed increased assist when ambulating over uneven surface this date d/t tripping over left big toe. Pt gaye to increase walking distance with use of RW however pts HR limits activity and she fatigues quickly. Pt would benefit from skilled PT to address above deficits. Will progress towards goals. Treatment Diagnosis: impaired endurance and functional mobility. Prognosis: Good  Decision Making: Medium Complexity  PT Education: Goals;PT Role;Plan of Care;Transfer Training;Equipment;Gait Training;General Safety  Patient Education: reviewed  Barriers to Learning: none  REQUIRES PT FOLLOW UP: Yes  Activity Tolerance  Activity Tolerance: Patient limited by fatigue;Patient limited by endurance;Treatment limited secondary to medical complications (free text)  Activity Tolerance: At baseline pt HR was 115 prior to activity, with stairs and longer walking distance pt HR up to 150's, pt gaye to recover to 120s with 1 min rest breaks inbetween activities. Patient Diagnosis(es): There were no encounter diagnoses. has a past medical history of Acute respiratory failure ([facility] ETOH abuse, Hemodialysis patient ([facility] Hep C w/o coma, chronic ([facility] History of blood transfusion, Opiate dependence, continuous ([facility] [facility] and Thyroid disease. has no past surgical history on file.     Restrictions  Restrictions/Precautions  Required Braces or Orthoses?: No  Vision/Hearing  Vision: Impaired  Vision Exceptions: Wears glasses for distance  Hearing: Within functional limits     Subjective  General  Chart Reviewed: Yes  Patient assessed for rehabilitation services?: Yes  Additional Pertinent Hx: PMH liver hepatitis, pancreatitis, liver cirrhosis, ascites, portal hypertension, neuropathy, due to increased alcohol abuse  Response To Previous Treatment: Not applicable  Family / Caregiver Present: No  Referring Practitioner: Dr Andreia Styles  Referral Date : 12/29/20  Diagnosis: weakness  General Comment  Comments: Pt in bed and agreeable to PT evaluation          Orientation  Orientation  Overall Orientation Status: Within Normal Limits  Social/Functional History  Social/Functional History  Lives With: Significant other(and 3year old)  Type of Home: Apartment  Home Layout: One level  Home Access: Stairs to enter without rails  Entrance Stairs - Number of Steps: 1+1  Entrance Stairs - Rails: None(then up 8 steps with L HR)  Bathroom Shower/Tub: Tub/Shower unit  Bathroom Toilet: Standard  Home Equipment: (none)  Receives Help From: (none needed PTA, pt states her parents could assist her at discharge)  ADL Assistance: Independent  Homemaking Assistance: Independent  Ambulation Assistance: Independent  Transfer Assistance: Independent  Active : Yes  Occupation: Unemployed  Leisure & Hobbies: play with her son  Cognition        Strength RLE  R Hip Flexion: 4/5  R Knee Extension: 4+/5  R Ankle Dorsiflexion: 4-/5  Strength LLE  Strength LLE: Exception  L Hip Flexion: 3+/5  L Knee Extension: 4+/5  L Ankle Dorsiflexion: 2+/5  Motor Control  Gross Motor?: Exceptions  Comments: B LE tremors  Coordination  Heel to Shin: Normal(tremors noted however smooth motion up to knee)  Sensation  Overall Sensation Status: Impaired  Light Touch: Partial deficits in the RLE;Partial deficits in the LLE(4/5 light touch L and 5/5 light touch R, hypersensative to touch,)  Bed mobility  Rolling to Left: Supervision  Rolling to Right: Supervision  Supine to Sit: Supervision  Sit to Supine: Supervision  Transfers  Sit to Stand: Minimal Assistance(HHA from therapist)  Stand to sit: Minimal Assistance  Bed to Chair: Minimal assistance(HHA from therapist)   Car transfer Min A   Ambulation  Ambulation?: Yes  More Ambulation?: Yes  Ambulation 1  Surface: level tile  Device: Hand-Held Assist  Assistance: Minimal assistance  Quality of Gait: dec ronald, decreased step height, tremors, dec step length, left toe drag  Gait Deviations: Slow Ronald;Decreased step length;Decreased step height  Distance: 12 ft  Ambulation 2  Device 2: Rolling Walker  Assistance 2: Contact guard assistance  Quality of Gait 2: slight steppage on the left side, decreased heel strike, decreased ronald, narrow ROLO,  Gait Deviations: Slow Ronald;Decreased step length;Decreased step height  Distance: 60 ft plus 2 turns  Comments: Pt HR up to 150 with longer ambulation distance  Ambulation 3  Surface - 3: uneven  Assistance 3: Moderate assistance  Quality of Gait 3: left toe drag, tripped over left toe, mod A to correct, cues for steppage pattern, narrow ROLO, dec ronald  Gait Deviations: Slow Ronald; Shuffles;Decreased step length;Decreased step height  Stairs/Curb  Stairs?: Yes  Stairs  # Steps : 2  Stairs Height: 4\"  Rails: Bilateral  Curbs: 6\"  Device: No Device  Assistance: Maximum assistance(to ascend curb step with no UE support, Min A on stairs with B UE)  Comment: max steps pt able to tolerate d/t leevasted HY ( up to 150 with 2 steps). Balance  Sitting - Static: Good;-  Sitting - Dynamic: Fair;+  Standing - Static: Fair;-  Standing - Dynamic: Poor  Comments: picked object off of floor without UE support min A  Exercises  Heelslides: X 10 B LEs  Hip Flexion: X 10 B LE in seated  Knee Long Arc Quad: X 10 B LE  Ankle Pumps: 2 X 10 B LE, min assist for full ROm on left side  Comments: pt needed increased  rest break d/t fatigue     Plan   Plan  Times per week: 5 out of 7 days  Times per day: Daily  Plan weeks: 10 days  Current Treatment Recommendations: Strengthening, Neuromuscular Re-education, Home Exercise Program, Safety Education & Training, Balance Training, Endurance Training, Patient/Caregiver Education & Training, Functional Mobility Training, Equipment Evaluation, Education, & procurement, Transfer Training, Gait Training, Stair training, Pain Management, Positioning  Safety Devices  Type of devices: All fall risk precautions in place, Gait belt, Bed alarm in place, Telesitter in use, Left in bed, Nurse notified  Restraints  Initially in place: No      Goals  Short term goals  Time Frame for Short term goals: Goals to be met by 01/04/21  Short term goal 1: Pt to perform bed mobility.  independently  Short term goal 2: Pt to perform sit to/from stand to LRAD SBA  Short term goal 3: Pt to perform gait X 100 ft with LRAD CGA  Short term goal 4: Pt to go up and down 8 steps with 1 HR CGA  Long term goals  Time Frame for Long term goals : Goals to be met by 01/07/21  Long term goal 1: Pt to perform sit to/from stand with LRAD: Supervision  Long term goal 2: Pt to perform gait X 150 ft with LRAD supervision  Long term goal 3: Pt to perform 2 curb steps with LRAD supervision  Long term goal 4: Pt to perform 8 steps with 1 HR and LRAD if needed Supervision  Patient Goals   Patient goals : \"to get better, to get stronger\"       Therapy Time   Individual Concurrent Group Co-treatment   Time In 0800         Time Out 0930         Minutes 90 Timed Code Treatment Minutes: 75 Minutes + 15 min Mod eval       Kishore Horse, PT, DPT

## 2020-12-30 NOTE — PROGRESS NOTES
Speech Language Pathology  Facility/Department: [facility]  Initial Speech/Language/Cognitive Assessment    NAME: Valentino Rico  : 1990   MRN: [id number]  ADMISSION DATE: 2020  ADMITTING DIAGNOSIS: has Hepatic encephalopathy ([address] on their problem list.  DATE ONSET: 20    Date of Eval: 2020   Evaluating Therapist: MAMIE Perez    RECENT RESULTS  CT OF HEAD/MRI:20    Primary Complaint:  Pt with complaints regarding difficulty with memory and confusion. Pt's goals: \"to get better\"    Pain:  Pain Assessment  Pain Assessment: Denies  Pain Level: 0    Assessment:  Cognitive Diagnosis: Mod-severe cognitive linguistic impairment  Speech Diagnosis: Vocie therapy for weak and hoarse vocal quality post prolonged intubation     Per H&P: \"Ms Elmer Keith is a 26year old female admitted from  [facility] where she presented with abdominal pain and nausea. She was also encephalopathic, and required sedation with fentanyl drip and ketamine gtt. She was intubated in late November, extubated, then intubated again on . She was started on lactulose and rifaximin with improvement in her mental state; rifaximin was subsequently dc'd. She was treated for MRSA pneumonia with vancomycin initially, subsequently ctx/flagyl/zosyn were added. Bronch did not reveal any alveolar hemorrhagae. She was started on prednisolone for alcoholic hepatitis initially, but this was discontinued due to infectious complications. She was evaluated by SLP and required a dysphagia diet. She also developed renal failure, ATN vs hepatorenal, and was started on CRRT, transitioned to HD. She developed neuropathic pain in her feet, likely related to EtOH abuse, and was started on gabapentin. \"    Pt sitting on edge of bed, alert and oriented. Pt cooperative and agreeable to participate in evaluation. Pt stated she lives with her fiance and [de-identified] year old son at home, currently unemployed.  Pt reported she manages her own finances and shares them with her fiance, manages her own medications, grocery shopping, cooking, laundry, and cleaning. Pt reported she feels more confused and is having difficulty with her memory after being extubated. Pt was administered the [facility]ILITATION scoring a 14/30. A score of 26/30 is considered WFL. Pt demonstrated areas of difficulty with executive function (specifically drawing portions due to hand tremors when writing), functional and short term recall, divergent naming, attention, processing, serial seven subtractions, and some orientation concepts. Pt demonstrated areas of strength with some orientation concepts, naming animals, and completing a trail. Pt would benefit from ongoing ST services to improve overall cognitive linguistic skills. [place] Cognitive Assessment (MoCA)    Section Subtest Score Comments   Visuospatial/ Executive [place] making 1/1     Copy Cube 0/1 inaccurate    Clock 1/3 Contour accurate, numbers inaccurate, hand placement inaccurate   Naming Picture naming 3/3    Attention Number repetition 2/2     Selective attention 1/1     Serial 7s 1/3 Significant difficulty   Language Repetition 0/2     Fluency 0/1 Pt able to name 4 items within 1 minute (WFL=11 or greater)   Abstraction Comparisons 0/2 Pt stated \"move\" for similarity between train/bicycle, and \"numbers\" for similarity between watch/ruler   Delayed Recall Recall 5 novel words 1/5 1/5 independently, improving to 2/5 given category cue, improving to 4/5 given multiple choice cues   Orientation Spatial and Temporal 4/6 Pt disoriented to date and city    Total:  14/30     Recommendations:  Requires SLP Intervention: Yes  Duration/Frequency of Treatment: 5x/wk during LOS  D/C Recommendations: 24 hour supervision/assistance       Plan:   Goals:  Short-term Goals  Timeframe for Short-term Goals: 7 days (01/05/21)  Goal 1: Pt will complete recall tasks with 80% accuracy given mod cues.   Goal 2: Pt will complete executive function tasks (meds,money, math, time, etc) with 80% accuracy given mod cues. Goal 3: Pt will complete problem solving and thought organization tasks with 80% accuracy given mod cues. Goal 4: Pt will complete verbal and visual reasoning tasks with 80% accuracy given mod cues. Goal 5: Pt will participate in vocal strengthening exercises for improved vocal quality with min cues. Goal 6: Pt will increase maximum phonation time to > 8 seconds. Long-term Goals  Timeframe for Long-term Goals: 10 days (01/08/21)  Goal 1: Pt will improve overall cognitive linguistic skills to increase safety and independence to return to PLOF. Patient/family involved in developing goals and treatment plan: yes    Subjective:  General  Chart Reviewed: Yes  Patient assessed for rehabilitation services?: Yes  Family / Caregiver Present: No  Subjective  Subjective: Pt alert and oriented, cooperative and agreeable to participate in evaluation. 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   Leisure & Hobbies: being with her son  Vision  Vision: Impaired  Vision Exceptions: Wears glasses for distance  Hearing  Hearing: Within functional limits           Objective:  Oral/Motor  Oral Motor: Within functional limits    Auditory Comprehension  Comprehension: Exceptions  Complex Questions: Mild  Conversation: Mild  Interfering Components: Attention - selective; Attention - sustained; Working memory;Processing Education: Edu provided re: role of SLP, results of assessment, and recommendations  Patient Education Response: Verbalizes understanding;Needs reinforcement  Safety Devices in place: Yes  Type of devices: All fall risk precautions in place; Left in bed;Bed alarm in place;Call light within reach; Other (comment); Patient at risk for falls    Therapy Time:   Individual   Time In 1000   Time Out 1035   Minutes [address] KIRK CCC-SLP  Speech-Language Pathologist  [id number]

## 2020-12-30 NOTE — CARE COORDINATION
Case Management ARU Admission Assessment     Objective:  met with the patient to complete initial assessment and review the role of Case Management while on the ARU. Patient educated on team conferences. Discussed family training with the patient/family on how it is encouraged on the unit. Order for \"discharge planning\" has been addressed. Family Present: none   Primary : pt reported that her parents are her decision makers and contacts     Admit date:     12/29/20                      Insurance: [facility]     Admitting diagnosis: Hepatic encephalopathy    Current home situation: pt reported that she was [place]. Pt lives with her [de-identified] and 11years old son. Pt receives help from her parents with her needs as well as caring for her son. Pt said her fiance helps with shopping and paying bills. Pt does have a HX of ISIDRO. Per CR the Pt has had use of Alcohol and opioids. DME at home: none         Services prior to admission: none     Preference for [place] [id number] or Outpatient therapy: none     Patient's goal(s): to return home     Working or Volunteering prior to admission:  __Yes _x_No                        Accessibility to community resources/transportation:    Family     Active with: none, Past HX with [facility] [place]  __Senior Services      __Council on Aging  __Other    Has patient experienced a recent loss or significant life event that would impact their care or ability to participate? _x_No  _x_Yes, please explain:     Has patient ever been treated for emotional disorder(s)? _x_No  __Yes, how does this affect current situation? Pt denied but does have ISIDRO concerns    Is patient and family coping appropriately with stressful events and this hospitalization?   _x_Yes  __No, please explain:    Support system at home and in the community: pt reported her parents and her Fiance     Caregiver physical ability: Pt reported he can help her as well as her parents     Who will be the one to contact for

## 2020-12-30 NOTE — PLAN OF CARE
[address] [facility], [address] [place] [address]   [phone]    Kailee Kent    : 1990  Acct #: [de-identified]  MRN: [id number]   PHYSICIAN:  Queen Marilyn MD  Primary Problem    Patient Active Problem List   Diagnosis    Hepatic encephalopathy [facility])       Rehabilitation Diagnosis:     Hepatic encephalopathy ([facility] [K72.90]       ADMIT DATE:2020    Patient Goals: \"To be more independent. \"    Admitting Impairments: Pt admitted to [address] [place] [address] after being admitted from [facility] d/t liver hepatitis, pancreatitis, liver cirrhosis, ascites, portal hypertension, neuropathy, due to increased alcohol abuse resulting in Decreased functional mobility ; Decreased endurance;Decreased coordination;Decreased ADL status; Decreased ROM; Decreased balance;Decreased strength;Decreased safe awareness;Decreased cognition;Decreased fine motor control;Decreased high-level IADLs;Decreased posture.    Barriers: None  Participation: Good     CARE PLAN     NURSING:  Kailee Kent while on this unit will:     [x] Be continent of bowel and bladder     [x] Have an adequate number of bowel movements  [] Urinate with no urinary retention >300ml in bladder  [] Complete bladder protocol with villafuerte removal  [] Maintain O2 SATs at ___%  [x] Have pain managed while on ARU       [] Be pain free by discharge   [x] Have no skin breakdown while on ARU  [] Have improved skin integrity via wound measurements  [] Have no signs/symptoms of infection at the wound site  [x] Be free from injury during hospitalization   [] Complete education with patient/family with understanding demonstrated for:  [] Adjustment   [x] Other:   Nursing interventions may include bowel/bladder training, education for medical assistive devices, medication education, O2 saturation management, energy conservation, stress management techniques, fall prevention, alarms protocol, seating and positioning, skin/wound care, pressure relief instruction,dressing changes,  infection protection, DVT prophylaxis, and/or assistance with in room safety with transfers to bed, toilet, wheelchair, shower as well as bathroom activities and hygiene. Patient/caregiver education for:   [x] Disease/sustained injury/management      [x] Medication Use   [] Surgical intervention   [x] Safety   [x] Body mechanics and or joint protection   [x] Health maintenance         PHYSICAL THERAPY:  Goals:                  Short term goals  Time Frame for Short term goals: Goals to be met by 01/04/21  Short term goal 1: Pt to perform bed mobility. independently  Short term goal 2: Pt to perform sit to/from stand to LRAD SBA  Short term goal 3: Pt to perform gait X 100 ft with LRAD CGA  Short term goal 4: Pt to go up and down 8 steps with 1 HR CGA            Long term goals  Time Frame for Long term goals : Goals to be met by 01/07/21  Long term goal 1: Pt to perform sit to/from stand with LRAD: Supervision  Long term goal 2: Pt to perform gait X 150 ft with LRAD supervision  Long term goal 3: Pt to perform 2 curb steps with LRAD supervision  Long term goal 4: Pt to perform 8 steps with 1 HR and LRAD if needed Supervision  These goals were reviewed with this patient at the time of assessment and Cher Campa is in agreement.      Plan of Care: Pt to be seen 5 out of 7 days per week, 60   mins (exact) per day for 10 days (exact)                   Current Treatment Recommendations: Strengthening, Neuromuscular Re-education, Home Exercise Program, Safety Education & Training, Balance Training, Endurance Training, Patient/Caregiver Education & Training, Functional Mobility Training, Equipment Evaluation, Education, & procurement, Transfer Training, Gait Training, Stair training, Pain Management, Positioning      OCCUPATIONAL THERAPY:  Goals:             Short term goals  Time Frame for Short term goals: 1/2/21- 5 days  Short term goal 1: Pt will complete toileting with SBA.  Short term goal 2: Pt will perform functional transfers with SBA. :  Long term goals  Time Frame for Long term goals : 1/7/21- 10 days  Long term goal 1: Pt will perform functional transfers with supervision. Long term goal 2: Pt will complete full body bathing with setup. Long term goal 3: Pt will perform full body dressing with setup. Long term goal 4: Pt will complete grooming with mod I.  Long term goal 5: Pt will perform simple homemaking task with supervision. :    These goals were reviewed with this patient at the time of assessment and Maurice Valdivia is in agreement    Plan of Care:  Pt to be seen 5 out of 7 days per week, 60   mins (exact) per day for 10 days (exact)  Current Treatment Recommendations: Strengthening, Patient/Caregiver Education & Training, Home Management Training, Equipment Evaluation, Education, & procurement, ROM, Balance Training, Functional Mobility Training, Endurance Training, Safety Education & Training, Self-Care / ADL      SPEECH THERAPY:  Goals:                          Short-term Goals  Timeframe for Short-term Goals: 7 days (01/05/21)  Goal 1: Pt will complete recall tasks with 80% accuracy given mod cues. Goal 2: Pt will complete executive function tasks (meds,money, math, time, etc) with 80% accuracy given mod cues. Goal 3: Pt will complete problem solving and thought organization tasks with 80% accuracy given mod cues. Goal 4: Pt will complete verbal and visual reasoning tasks with 80% accuracy given mod cues. Goal 5: Pt will participate in vocal strengthening exercises for improved vocal quality with min cues. Goal 6: Pt will increase maximum phonation time to > 8 seconds.               Timeframe for Short-term Goals: 7 days (01/05/21)  These goals were reviewed with this patient at the time of assessment and Maurice Valdivia is in agreement    Plan of Care: Pt to be seen 5 out of 7 days per week, 60 mins (exact) per day for 10 days (exact)             Dysphagia:  Object CARE Score: 3 Discharge Goal: Supervision or touching assistance   Wheel 50 feet, 2 turns       [facility] will be seen a minimum of 3 hours of therapy per day, a minimum of 5 out of 7 days per week. [] In this rare instance due to the nature of this patient's medical involvement, this patient will be seen 15 hours per week (900 minutes within a 7 day period). Treatments may include therapeutic exercises, gait training, neuromuscular re-ed, transfer training, community reintegration, bed mobility, w/c mobility and training, self care, home mgmt, cognitive training, energy conservation,dysphagia tx, speech/language/communication therapy, group therapy, and patient/family education. In addition, dietician/nutritionist may monitor calorie count as well as intake and collaboratively work with SLP on dietary upgrades. Neuropsychology/Psychology may evaluate and provide necessary support. Medical issues being managed closely and that require 24 hour availability of a physician:   [] Swallowing Precautions  [x] Bowel/Bladder Fx  [] Weight bearing precautions   [] Wound Care    [x] Pain Mgmt   [x] Infection Protection   [x] DVT Prophylaxis   [x] Fall Precautions  [x] Fluid/Electrolyte/Nutrition Balance   [x] Voice Protection   [] Respiratory  [] Other:    Medical Prognosis: [x] Good  [] Fair    [] Guarded   Total expected IRF days 10  Anticipated discharge destination:    [] Home Independently   [] Home Modified Independent  [x] Home with supervision    []SNF     [] Other                                           Physician anticipated functional outcomes:  Home w/ supervision and outpatient services. IPOC brief synthesis: 26year old female admitted to ARU to address strengthening, endurance, balance, gait, ADLs, assistive/adaptive device needs, patient/family training, pain control, speech, language, cognition.      This plan has been reviewed with Jolene Loomis on 12/30  in a language the patient understands. [facility] has had the opportunity to include input with the therapy team.      I have reviewed this initial plan of care and agree with its contents:    Title   Name    Date    Time    Physician: Daniella Woodall.  Hazel Ashley MD 12/31/2020, 9:22 AM     Manuel SULTANA    OT: Park Adair, OTR/L    PT: Jatinder Acosta, PT, DPT    RN: Edgardo Bolton RN, BSN    ST: Pinky Borden MA [facility] SLP    : Gladys Rosales, OTR/L    Other:

## 2020-12-31 LAB
ALBUMIN SERPL-MCNC: 2.2 G/DL (ref 3.4–5)
AMMONIA: 121 UMOL/L (ref 11–51)
ANION GAP SERPL CALCULATED.3IONS-SCNC: 14 MMOL/L (ref 3–16)
BUN BLDV-MCNC: 27 MG/DL (ref 7–20)
CALCIUM SERPL-MCNC: 8.1 MG/DL (ref 8.3–10.6)
CHLORIDE BLD-SCNC: 86 MMOL/L (ref 99–110)
CO2: 21 MMOL/L (ref 21–32)
CREAT SERPL-MCNC: 5.1 MG/DL (ref 0.6–1.1)
GFR AFRICAN AMERICAN: 12
GFR NON-AFRICAN AMERICAN: 10
GLUCOSE BLD-MCNC: 99 MG/DL (ref 70–99)
HCT VFR BLD CALC: 22.9 % (ref 36–48)
HEMOGLOBIN: 7.2 G/DL (ref 12–16)
MCH RBC QN AUTO: 29 PG (ref 26–34)
MCHC RBC AUTO-ENTMCNC: 31.7 G/DL (ref 31–36)
MCV RBC AUTO: 91.4 FL (ref 80–100)
PDW BLD-RTO: 17.4 % (ref 12.4–15.4)
PHOSPHORUS: 4.6 MG/DL (ref 2.5–4.9)
PLATELET # BLD: 109 K/UL (ref 135–450)
PMV BLD AUTO: 7.3 FL (ref 5–10.5)
POTASSIUM SERPL-SCNC: 2.7 MMOL/L (ref 3.5–5.1)
RBC # BLD: 2.5 M/UL (ref 4–5.2)
SODIUM BLD-SCNC: 121 MMOL/L (ref 136–145)
WBC # BLD: 11.7 K/UL (ref 4–11)

## 2020-12-31 PROCEDURE — 6370000000 HC RX 637 (ALT 250 FOR IP): Performed by: PHYSICAL MEDICINE & REHABILITATION

## 2020-12-31 PROCEDURE — 6370000000 HC RX 637 (ALT 250 FOR IP): Performed by: INTERNAL MEDICINE

## 2020-12-31 PROCEDURE — 1280000000 HC REHAB R&B

## 2020-12-31 PROCEDURE — 97130 THER IVNTJ EA ADDL 15 MIN: CPT

## 2020-12-31 PROCEDURE — 5A1D70Z PERFORMANCE OF URINARY FILTRATION, INTERMITTENT, LESS THAN 6 HOURS PER DAY: ICD-10-PCS | Performed by: INTERNAL MEDICINE

## 2020-12-31 PROCEDURE — 97530 THERAPEUTIC ACTIVITIES: CPT

## 2020-12-31 PROCEDURE — 97112 NEUROMUSCULAR REEDUCATION: CPT

## 2020-12-31 PROCEDURE — 97116 GAIT TRAINING THERAPY: CPT

## 2020-12-31 PROCEDURE — 6360000002 HC RX W HCPCS: Performed by: INTERNAL MEDICINE

## 2020-12-31 PROCEDURE — 6360000002 HC RX W HCPCS: Performed by: PHYSICAL MEDICINE & REHABILITATION

## 2020-12-31 PROCEDURE — 85027 COMPLETE CBC AUTOMATED: CPT

## 2020-12-31 PROCEDURE — 97110 THERAPEUTIC EXERCISES: CPT

## 2020-12-31 PROCEDURE — 80069 RENAL FUNCTION PANEL: CPT

## 2020-12-31 PROCEDURE — 90935 HEMODIALYSIS ONE EVALUATION: CPT

## 2020-12-31 PROCEDURE — 82140 ASSAY OF AMMONIA: CPT

## 2020-12-31 PROCEDURE — 97129 THER IVNTJ 1ST 15 MIN: CPT

## 2020-12-31 RX ORDER — SENNA AND DOCUSATE SODIUM 50; 8.6 MG/1; MG/1
2 TABLET, FILM COATED ORAL 2 TIMES DAILY PRN
Status: DISCONTINUED | OUTPATIENT
Start: 2020-12-31 | End: 2021-01-09 | Stop reason: HOSPADM

## 2020-12-31 RX ORDER — LACTULOSE 10 G/15ML
20 SOLUTION ORAL 3 TIMES DAILY
Status: DISCONTINUED | OUTPATIENT
Start: 2020-12-31 | End: 2021-01-01

## 2020-12-31 RX ORDER — HEPARIN SODIUM 1000 [USP'U]/ML
2500 INJECTION, SOLUTION INTRAVENOUS; SUBCUTANEOUS PRN
Status: DISCONTINUED | OUTPATIENT
Start: 2020-12-31 | End: 2021-01-09 | Stop reason: HOSPADM

## 2020-12-31 RX ORDER — HEPARIN SODIUM 1000 [USP'U]/ML
3200 INJECTION, SOLUTION INTRAVENOUS; SUBCUTANEOUS PRN
Status: DISCONTINUED | OUTPATIENT
Start: 2020-12-31 | End: 2021-01-09 | Stop reason: HOSPADM

## 2020-12-31 RX ORDER — POTASSIUM CHLORIDE 20 MEQ/1
20 TABLET, EXTENDED RELEASE ORAL 2 TIMES DAILY
Status: DISCONTINUED | OUTPATIENT
Start: 2020-12-31 | End: 2021-01-01

## 2020-12-31 RX ADMIN — HEPARIN SODIUM 3200 UNITS: 1000 INJECTION INTRAVENOUS; SUBCUTANEOUS at 17:36

## 2020-12-31 RX ADMIN — TORSEMIDE 60 MG: 20 TABLET ORAL at 08:54

## 2020-12-31 RX ADMIN — MIDODRINE HYDROCHLORIDE 10 MG: 5 TABLET ORAL at 12:18

## 2020-12-31 RX ADMIN — GABAPENTIN 200 MG: 100 CAPSULE ORAL at 18:00

## 2020-12-31 RX ADMIN — LACTULOSE 20 G: 20 SOLUTION ORAL at 20:32

## 2020-12-31 RX ADMIN — GABAPENTIN 200 MG: 100 CAPSULE ORAL at 08:55

## 2020-12-31 RX ADMIN — Medication 1 CAPSULE: at 08:55

## 2020-12-31 RX ADMIN — MIDODRINE HYDROCHLORIDE 10 MG: 5 TABLET ORAL at 18:00

## 2020-12-31 RX ADMIN — THIAMINE HCL TAB 100 MG 100 MG: 100 TAB at 10:13

## 2020-12-31 RX ADMIN — POTASSIUM CHLORIDE 20 MEQ: 1500 TABLET, EXTENDED RELEASE ORAL at 20:31

## 2020-12-31 RX ADMIN — DOCUSATE SODIUM 50MG AND SENNOSIDES 8.6MG 2 TABLET: 8.6; 5 TABLET, FILM COATED ORAL at 08:54

## 2020-12-31 RX ADMIN — FERROUS SULFATE TAB 325 MG (65 MG ELEMENTAL FE) 324 MG: 325 (65 FE) TAB at 08:55

## 2020-12-31 RX ADMIN — POTASSIUM CHLORIDE 10 MEQ: 750 TABLET, EXTENDED RELEASE ORAL at 08:54

## 2020-12-31 RX ADMIN — HEPARIN SODIUM 5000 UNITS: 5000 INJECTION INTRAVENOUS; SUBCUTANEOUS at 05:54

## 2020-12-31 RX ADMIN — Medication 1 TABLET: at 08:54

## 2020-12-31 RX ADMIN — CAPSAICIN: 0.25 CREAM TOPICAL at 08:54

## 2020-12-31 RX ADMIN — HEPARIN SODIUM 5000 UNITS: 5000 INJECTION INTRAVENOUS; SUBCUTANEOUS at 20:32

## 2020-12-31 RX ADMIN — ACETAMINOPHEN 650 MG: 325 TABLET ORAL at 18:00

## 2020-12-31 RX ADMIN — FOLIC ACID 1 MG: 1 TABLET ORAL at 08:55

## 2020-12-31 RX ADMIN — MIDODRINE HYDROCHLORIDE 10 MG: 5 TABLET ORAL at 08:56

## 2020-12-31 RX ADMIN — LACTULOSE 20 G: 20 SOLUTION ORAL at 18:03

## 2020-12-31 RX ADMIN — CAPSAICIN: 0.25 CREAM TOPICAL at 20:32

## 2020-12-31 RX ADMIN — GABAPENTIN 200 MG: 100 CAPSULE ORAL at 20:31

## 2020-12-31 ASSESSMENT — PAIN SCALES - GENERAL: PAINLEVEL_OUTOF10: 7

## 2020-12-31 ASSESSMENT — PAIN DESCRIPTION - LOCATION: LOCATION: FOOT

## 2020-12-31 ASSESSMENT — PAIN DESCRIPTION - ORIENTATION: ORIENTATION: LEFT

## 2020-12-31 ASSESSMENT — PAIN DESCRIPTION - PAIN TYPE
TYPE: CHRONIC PAIN
TYPE: CHRONIC PAIN

## 2020-12-31 NOTE — PROGRESS NOTES
Physical Therapy  Facility/Department: [facility]  Daily Treatment Note  NAME: Mar Stanton  : 1990  MRN: [id number]    Date of Service: 2020    Discharge Recommendations:  24 hour supervision or assist, Outpatient PT   PT Equipment Recommendations  Other: CTA    Assessment   Body structures, Functions, Activity limitations: Decreased functional mobility ; Decreased sensation; Increased pain;Decreased balance;Decreased strength;Decreased safe awareness;Decreased endurance  Assessment: Pt required less assist this date with transfers and for walking. pt states she feels more secure with RW however does not feel like she will able to fit RW throughtout her home. pt able to complete step up in // bars with less assist this date and able to tolerate all supine and sittign TE however needed inreased rest break d/t fatigue of LE's muscles. Will continue to progress towards goals. 2nd session: Pt extremely fatigued from all of her morning PT/OT session. Long discussion with pt regarding balance and neuropathy. Pt verb understanding and able to utilize difference balance strategies when reaching for cone this session. Will continue to progress towards goals. Treatment Diagnosis: impaired endurance and functional mobility. Prognosis: Good  Decision Making: Medium Complexity  PT Education: Goals;PT Role;Plan of Care;Transfer Training;Equipment;Gait Training;General Safety; Functional Mobility Training  Barriers to Learning: none  REQUIRES PT FOLLOW UP: Yes  Activity Tolerance  Activity Tolerance: Patient Tolerated treatment well;Patient limited by fatigue     Patient Diagnosis(es): There were no encounter diagnoses. has a past medical history of Acute respiratory failure ([facility] ETOH abuse, Hemodialysis patient ([facility] Hep C w/o coma, chronic ([facility] History of blood transfusion, Opiate dependence, continuous ([facility] [facility] and Thyroid disease.    has no past surgical history on Co-treatment   Time In 0900         Time Out 1000         Minutes 60         Timed Code Treatment Minutes: 61 Minutes     Second Session Therapy Time:   Individual Concurrent Group Co-treatment   Time In 1100         Time Out 1130         Minutes 30           Timed Code Treatment Minutes:  30    Total Treatment Minutes:  Ron Calhoun PT, DPT

## 2020-12-31 NOTE — PROGRESS NOTES
Kj Blower  12/31/2020  [id number]    Chief Complaint: Hepatic encephalopathy ([address]    Subjective:   Walking in hallways with OT. No new c/o this AM.     ROS: No n/v, cp, sob, f/c  Objective:  Patient Vitals for the past 24 hrs:   BP Temp Temp src Pulse Resp SpO2 Height   12/31/20 0730 111/72 97.4 °F (36.3 °C) Oral 105 16 97 % --   12/30/20 1945 108/67 98.2 °F (36.8 °C) Oral 91 16 98 % --   12/30/20 1328 106/69 -- -- 111 -- -- --   12/30/20 1259 -- -- -- -- -- -- 5' 6\" (1.676 m)     Gen: No distress, pleasant. HEENT: Normocephalic, atraumatic. CV: Regular rate and rhythm. Resp: No respiratory distress. Abd: Soft, nontender   Ext: No edema. Neuro: Alert, oriented, appropriately interactive. Wt Readings from Last 3 Encounters:   12/29/20 110 lb 14.3 oz (50.3 kg)   06/17/20 124 lb (56.2 kg)   06/28/16 124 lb (56.2 kg)       Laboratory data:   Lab Results   Component Value Date    WBC 12.3 (H) 12/30/2020    HGB 7.6 (L) 12/30/2020    HCT 23.8 (L) 12/30/2020    MCV 91.6 12/30/2020     (L) 12/30/2020       Lab Results   Component Value Date     12/30/2020    K 3.3 12/30/2020    K 3.6 06/19/2020    CL 91 12/30/2020    CO2 21 12/30/2020    BUN 20 12/30/2020    CREATININE 4.7 12/30/2020    GLUCOSE 83 12/30/2020    CALCIUM 8.4 12/30/2020        Therapy progress:  PT  Position Activity Restriction  Other position/activity restrictions: L foot drop,  Objective     Sit to Stand: Minimal Assistance(HHA from therapist)  Stand to sit: Minimal Assistance  Bed to Chair: Minimal assistance(HHA from therapist)  Device: Hand-Held Assist  Assistance: Minimal assistance  Distance: 12 ft  OT  PT Equipment Recommendations  Other: CTA  Toilet - Technique: Ambulating  Equipment Used: Standard toilet  Toilet Transfers Comments: min A to stand from toilet  Assessment        SLP  Current Diet : Regular  Current Liquid Diet : Thin  Diet Solids Recommendation: Regular  Liquid Consistency Recommendation:  Thin    Body mass index is 17.9 kg/m². Rehabilitation Diagnosis:  Neurologic, 3.8, Neuromuscular Disorders, e.g. Critical Illness Myopathy, Other Myopathy     Assessment and Plan:  Alcoholic hepatitis with hepatic encephalopathy. Rifaximin discontinued. Continue lactulose, follow ammonia twice weekly, wean lactulose down as able. Outpatient follow up with hepatology.      Dysphagia. SLP     MRSA pneumonia with resp failure. Abx completed. Pulm toilet, volume expansion.        Renal failure, hepatorenal vs ATN, now on HD. Nephrology consulted.      Electrolyte abnormalities. Nephrology consulted for HD.      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 Gabapentin; increased dose yesterday with improvement in symptoms.      Bladder: Check PVR x 3. [address] if PVR > 200ml or if any volume is > 500 ml.      Sleep: Trazodone provided prn.      Follow up appointments: GI/hepatology, PCP    Viktoria Thornton MD 12/31/2020, 9:23 AM

## 2020-12-31 NOTE — CARE COORDINATION
COSTA spoke with the Pt mother and Андрей Dsouza via phone. SW updated her on the pt progress and DC plans for 1/7 DC date. Hart Fothergill was asking if the Pt had completed a living will SW looked and she had only completed a POA. Pt mother was concerned because she did not believe the pt was able to understand a living will due to her  Decreased memory.    RD Cline

## 2020-12-31 NOTE — PROGRESS NOTES
alarm in place;Call light within reach;Nurse notified;Gait belt         Patient Diagnosis(es): There were no encounter diagnoses. has a past medical history of Acute respiratory failure ([facility] [address] ETOH abuse, Hemodialysis patient ([facility] [address] Hep C w/o coma, chronic [facility] History of blood transfusion, Opiate dependence, continuous ([facility] and Thyroid disease. has no past surgical history on file. Restrictions  Restrictions/Precautions  Restrictions/Precautions: General Precautions, Fall Risk  Required Braces or Orthoses?: No  Position Activity Restriction  Other position/activity restrictions: L foot drop  Subjective   General  Chart Reviewed: Yes, Orders, Progress Notes, History and Physical, Labs  Patient assessed for rehabilitation services?: Yes  Additional Pertinent Hx: Hep C, PNA, Alcohol hepatitis, Herion abuse  Family / Caregiver Present: No  Referring Practitioner: Ayse Rodriguez MD  Diagnosis: hepatic encephalopathy  Subjective  Subjective: Pt in bed, pleasant, agreeable to OT session. Pain Assessment  Pain Assessment: 0-10  Pain Level: 7  Pain Type: Chronic pain  Pain Location: Foot  Pain Orientation: Left  Pain Descriptors: Throbbing  Non-Pharmaceutical Pain Intervention(s): Ambulation/Increased Activity; Emotional support;Repositioned  Response to Pain Intervention: Patient Satisfied  Vital Signs  Patient Currently in Pain: Yes   Orientation  Orientation  Overall Orientation Status: Within Functional Limits  Objective             Balance  Sitting Balance: Supervision  Standing Balance: Contact guard assistance(varying from SBA to CGA throughout session)  Standing Balance  Time: 4:45, 2-3 minutes, 30 seconds x2  Activity: collection of cones around unit, tub transfer  Functional Mobility  Functional - Mobility Device: No device  Activity: To/from bathroom; To/From therapy gym  Assist Level: Contact guard assistance  Tub Transfers  Tub - Transfer From: Wheelchair  Tub - Transfer Type:  To and From  Tub - Transfer To: Shower seat with back  Tub - Technique: Ambulating  Tub Transfers: Contact guard     Transfers  Stand Step Transfers: Contact guard assistance  Sit to stand: Supervision  Stand to sit: Supervision        Coordination  Gross Motor: good coordination for [employer] with no drops of cards or knocking over cards, mod VCs for problem solving for game rules  Fine Motor: fair to poor coordination for nesting blocks, better with R hand than L hand, unable to stack smallest blocks on each other but able to build tower and nest/unnest blocks with no dropping of items              Cognition  Overall Cognitive Status: Exceptions  Arousal/Alertness: Appropriate responses to stimuli  Following Commands: Follows multistep commands with repitition; Follows multistep commands with increased time  Attention Span: Attends with cues to redirect  Memory: Decreased recall of recent events;Decreased short term memory  Safety Judgement: Decreased awareness of need for assistance  Problem Solving: Assistance required to generate solutions  Insights: Decreased awareness of deficits  Initiation: Does not require cues  Sequencing: Requires cues for some                    Type of ROM/Therapeutic Exercise  Type of ROM/Therapeutic Exercise: Free weights  Comment: 2#  Exercises  Shoulder Flexion: x15  Shoulder Extension: x15  Horizontal ABduction: x15  Horizontal ADduction: x15  Elbow Flexion: x15  Elbow Extension: x15  Supination: x15  Pronation: x15  Wrist Flexion: x15  Wrist Extension: x15  Other: x15 chest press, sit<>stands x10                    Plan   Plan  Times per week: 5/7 days/week  Plan weeks: 10 days  Current Treatment Recommendations: Strengthening, Patient/Caregiver Education & Training, Home Management Training, Equipment Evaluation, Education, & procurement, ROM, Balance Training, Functional Mobility Training, Endurance Training, Safety Education & Training, Self-Care / ADL    Goals  Short term goals  Time Frame for Short term goals: 1/2/21- 5 days  Short term goal 1: Pt will complete toileting with SBA. Short term goal 2: Pt will perform functional transfers with SBA. Long term goals  Time Frame for Long term goals : 1/7/21- 10 days  Long term goal 1: Pt will perform functional transfers with supervision. Long term goal 2: Pt will complete full body bathing with setup. Long term goal 3: Pt will perform full body dressing with setup. Long term goal 4: Pt will complete grooming with mod I.  Long term goal 5: Pt will perform simple homemaking task with supervision.   Patient Goals   Patient goals : \"get better, get stronger, more independent\"       Therapy Time   Individual Concurrent Group Co-treatment   Time In 0730         Time Out 0817         Minutes 47         Timed Code Treatment Minutes: [address] [place] Time:   Individual Concurrent Group Co-treatment   Time In 8454         Time Out 1100         Minutes 30           Timed Code Treatment Minutes:  30 Minutes    Total Treatment Minutes:  77 minutes      Colonel Carrion, OT

## 2020-12-31 NOTE — PROGRESS NOTES
Speech Language Pathology  [facility]: ACUTE REHAB UNIT  SPEECH-LANGUAGE PATHOLOGY      [x] Daily  [] Weekly Care Conference Note  [] Discharge    Patient:Verna Laws      :1990  ITV:[id number]  Rehab Dx/Hx: Hepatic encephalopathy ([address] [K72.90]    Precautions: falls  Home situation:   ST Dx: [] Aphasia  [] Dysarthria  [] Apraxia   [] Oropharyngeal dysphagia [x] Cognitive Impairment  [x] Other: Voice  Date of Admit: 2020  Room #: 0776/8035-36    Current functional status (updated daily):         Pt being seen for : [x] Speech/Language Treatment  [] Dysphagia Treatment [x] Cognitive Treatment  [] Other:  Communication: [x]WFL  [] Aphasia  [] Dysarthria  [] Apraxia  [] Pragmatic Impairment [] Non-verbal  [] Hearing Loss  [] Other:   Cognition: [] WFL  [] Mild  [x] Moderate  [x] Severe [] Unable to Assess  [] Other:  Memory: [] WFL  [] Mild  [x] Moderate  [x] Severe [] Unable to Assess  [] Other:  Behavior: [x] Alert  [x] Cooperative  [x]  Pleasant  [] Confused  [] Agitated  [] Uncooperative  [] Distractible [] Motivated  [] Self-Limiting [] Anxious  [] Other:  Endurance:  [x] Adequate for participation in SLP sessions  [] Reduced overall  [] Lethargic  [] Other:  Safety: [] No concerns at this time  [x] Reduced insight into deficits  [x]  Reduced safety awareness [] Not following call light procedures  [] Unable to Assess  [] Other:    Current Diet Order:DIET GENERAL;  Dietary Nutrition Supplements: Frozen Oral Supplement  Swallowing Precautions: HOB 90* and 30\" after meals; small bites/sips; alternate solids/liquids every 3-5 bites; oral care after every meal        Date: 2020      Tx session 1  [id number] Tx session 2  Al tx needs met session 1   Total Timed Code Min 60    Total Treatment Minutes 60    Individual Treatment Minutes 60    Group Treatment Minutes 0 0   Co-Treat Minutes 0 0   Variance/Reason:  N/a    Pain No c/o pain    Pain Intervention [] RN notified  [] Repositioned  [] Intervention offered and patient declined  [] N/A  [] Other: [] RN notified  [] Repositioned  [] Intervention offered and patient declined  [] N/A  [] Other:   Subjective     Pt seen upright in bed, cooperative and pleasant throughout session. Objective:  Goals     Short-term Goals  Timeframe for Short-term Goals: 7 days (01/05/21)    Goal 1: Pt will complete recall tasks with 80% accuracy given mod cues. Functional recall task of 4 names of treating therapists / RN this date. ~10-minute delay: 2/4, no cues; increased to 3/4 given min cues  ~30-minute delay: 3/4, no cues    -Strategies of association, visualization, grouping, and repetition were reviewed for improved recall. Immediate recall task (3 words, ranking): 5/7 (71% accuracy)  -She required repetition of words at times. She often self-corrected errors      Goal 2: Pt will complete executive function tasks (meds,money, math, time, etc) with 80% accuracy given mod cues. Counting coins, ID target amount: 5/5 (100% accuracy), no cues    Goal 3: Pt will complete problem solving and thought organization tasks with 80% accuracy given mod cues. 4-step sequencing cards: 4/5 (80% accuracy), min cues;     6-step sequencing cards: 1/2 (50% accuracy), mod cues required; minimal trials completed d/t reaching end of session. SLP encouraged pt to start by identifying the first or last step prior to sequencing the entire task. Goal 4: Pt will complete verbal and visual reasoning tasks with 80% accuracy given mod cues. Did not directly target    Goal 5: Pt will participate in vocal strengthening exercises for improved vocal quality with min cues. Did not directly target    Goal 6: Pt will increase maximum phonation time to > 8 seconds.    Did not directly target      Timeframe for Short-term Goals: 7 days (01/05/21)         Other areas targeted: N/a      Education:   SLP re: role of ST, use of internal and external memory strategies for improved recall. Safety Devices: [x] Call light within reach  [] Chair alarm activated  [x] Bed alarm activated  [x] Other: pt's belongings within reach [] Call light within reach  [] Chair alarm activated  [] Bed alarm activated  [] Other:    Assessment: Pt was pleasant and cooperative throughout session, actively participated in all tasks. Pt stated \"my memory's awful\" and stated that her mom \"has to repeat things to me a lot\". She benefited from consistent repetition and simplification of stimuli during structured tasks for improved accuracy. She is motivated to improve. She reported that her voice is \"still about the same\" -- consider ENT consult in the future. Continue with goals per POC. Plan: Continue as per plan of care. Additional Information:     Barriers toward progress: Cognitive deficit, reduced insight into deficits  Discharge recommendations:  [] Home independently  [] Home with assistance []  24 hour supervision  [] ECF [x] Other: See PT/OT  Continued Tx Upon Discharge: ? [] Yes [] No [x] TBD based on progress while on ARU [] Vital Stim indicated [] Other:   Estimated discharge date: TBD    Interventions used this date:  [] Speech/Language Treatment  [] Instruction in HEP [] Group [] Dysphagia Treatment [] Cognitive Treatment   [] Other:       Total Time Breakdown / Charges    Time in Time out Total Time / units   Cognitive Tx 1230 1330 60 min / 4 units   Speech Tx      Dysphagia Tx          Electronically Signed by  Yonatan Dugan M.S. [id number] [facility]  Speech-language pathologist  [id number]

## 2020-12-31 NOTE — PROGRESS NOTES
Respiratory Failure:  MRSA pneumonia and pulmonary edema. Resolved     Anemia of chronic disease:  Hb below target     Hypotension:  Part of cirrhotic physiology.   On midodrine     Plan/     HD today  AMINATA for anemia  Follow labs   Monitor for recovery   -----------------------------  Angeles Flaherty M.D.   [facility]

## 2021-01-01 LAB
ALBUMIN SERPL-MCNC: 2.3 G/DL (ref 3.4–5)
AMMONIA: 139 UMOL/L (ref 11–51)
ANION GAP SERPL CALCULATED.3IONS-SCNC: 15 MMOL/L (ref 3–16)
BUN BLDV-MCNC: 9 MG/DL (ref 7–20)
CALCIUM SERPL-MCNC: 8 MG/DL (ref 8.3–10.6)
CHLORIDE BLD-SCNC: 95 MMOL/L (ref 99–110)
CO2: 20 MMOL/L (ref 21–32)
CREAT SERPL-MCNC: 2.6 MG/DL (ref 0.6–1.1)
GFR AFRICAN AMERICAN: 26
GFR NON-AFRICAN AMERICAN: 21
GLUCOSE BLD-MCNC: 143 MG/DL (ref 70–99)
MAGNESIUM: 1.6 MG/DL (ref 1.8–2.4)
PHOSPHORUS: 2.1 MG/DL (ref 2.5–4.9)
POTASSIUM SERPL-SCNC: 2.3 MMOL/L (ref 3.5–5.1)
SODIUM BLD-SCNC: 130 MMOL/L (ref 136–145)

## 2021-01-01 PROCEDURE — 97129 THER IVNTJ 1ST 15 MIN: CPT

## 2021-01-01 PROCEDURE — 6360000002 HC RX W HCPCS: Performed by: PHYSICAL MEDICINE & REHABILITATION

## 2021-01-01 PROCEDURE — 97110 THERAPEUTIC EXERCISES: CPT

## 2021-01-01 PROCEDURE — 82140 ASSAY OF AMMONIA: CPT

## 2021-01-01 PROCEDURE — 6370000000 HC RX 637 (ALT 250 FOR IP): Performed by: PHYSICAL MEDICINE & REHABILITATION

## 2021-01-01 PROCEDURE — 36415 COLL VENOUS BLD VENIPUNCTURE: CPT

## 2021-01-01 PROCEDURE — 80069 RENAL FUNCTION PANEL: CPT

## 2021-01-01 PROCEDURE — 97530 THERAPEUTIC ACTIVITIES: CPT

## 2021-01-01 PROCEDURE — 97112 NEUROMUSCULAR REEDUCATION: CPT

## 2021-01-01 PROCEDURE — 84703 CHORIONIC GONADOTROPIN ASSAY: CPT

## 2021-01-01 PROCEDURE — 92507 TX SP LANG VOICE COMM INDIV: CPT

## 2021-01-01 PROCEDURE — 83735 ASSAY OF MAGNESIUM: CPT

## 2021-01-01 PROCEDURE — 97130 THER IVNTJ EA ADDL 15 MIN: CPT

## 2021-01-01 PROCEDURE — 97535 SELF CARE MNGMENT TRAINING: CPT

## 2021-01-01 PROCEDURE — 97116 GAIT TRAINING THERAPY: CPT

## 2021-01-01 PROCEDURE — 1280000000 HC REHAB R&B

## 2021-01-01 PROCEDURE — 6370000000 HC RX 637 (ALT 250 FOR IP): Performed by: INTERNAL MEDICINE

## 2021-01-01 RX ORDER — POTASSIUM CHLORIDE 20 MEQ/1
40 TABLET, EXTENDED RELEASE ORAL 3 TIMES DAILY
Status: DISCONTINUED | OUTPATIENT
Start: 2021-01-01 | End: 2021-01-04

## 2021-01-01 RX ORDER — LACTULOSE 10 G/15ML
45 SOLUTION ORAL 3 TIMES DAILY
Status: DISCONTINUED | OUTPATIENT
Start: 2021-01-01 | End: 2021-01-04

## 2021-01-01 RX ORDER — GABAPENTIN 100 MG/1
100 CAPSULE ORAL 3 TIMES DAILY
Status: DISCONTINUED | OUTPATIENT
Start: 2021-01-01 | End: 2021-01-09 | Stop reason: HOSPADM

## 2021-01-01 RX ORDER — LACTULOSE 10 G/15ML
45 SOLUTION ORAL 3 TIMES DAILY
Status: DISCONTINUED | OUTPATIENT
Start: 2021-01-01 | End: 2021-01-01

## 2021-01-01 RX ADMIN — CAPSAICIN: 0.25 CREAM TOPICAL at 20:26

## 2021-01-01 RX ADMIN — GABAPENTIN 100 MG: 100 CAPSULE ORAL at 13:41

## 2021-01-01 RX ADMIN — MIDODRINE HYDROCHLORIDE 10 MG: 5 TABLET ORAL at 12:15

## 2021-01-01 RX ADMIN — FOLIC ACID 1 MG: 1 TABLET ORAL at 09:45

## 2021-01-01 RX ADMIN — LACTULOSE 30 G: 20 SOLUTION ORAL at 13:41

## 2021-01-01 RX ADMIN — MIDODRINE HYDROCHLORIDE 10 MG: 5 TABLET ORAL at 09:45

## 2021-01-01 RX ADMIN — POTASSIUM CHLORIDE 40 MEQ: 20 TABLET, EXTENDED RELEASE ORAL at 20:25

## 2021-01-01 RX ADMIN — Medication 1 CAPSULE: at 09:45

## 2021-01-01 RX ADMIN — POTASSIUM CHLORIDE 40 MEQ: 20 TABLET, EXTENDED RELEASE ORAL at 13:41

## 2021-01-01 RX ADMIN — HEPARIN SODIUM 5000 UNITS: 5000 INJECTION INTRAVENOUS; SUBCUTANEOUS at 20:26

## 2021-01-01 RX ADMIN — HEPARIN SODIUM 5000 UNITS: 5000 INJECTION INTRAVENOUS; SUBCUTANEOUS at 13:41

## 2021-01-01 RX ADMIN — CAPSAICIN: 0.25 CREAM TOPICAL at 09:48

## 2021-01-01 RX ADMIN — LACTULOSE 20 G: 20 SOLUTION ORAL at 09:44

## 2021-01-01 RX ADMIN — THIAMINE HCL TAB 100 MG 100 MG: 100 TAB at 12:15

## 2021-01-01 RX ADMIN — POTASSIUM CHLORIDE 20 MEQ: 1500 TABLET, EXTENDED RELEASE ORAL at 09:45

## 2021-01-01 RX ADMIN — TORSEMIDE 60 MG: 20 TABLET ORAL at 12:15

## 2021-01-01 RX ADMIN — GABAPENTIN 100 MG: 100 CAPSULE ORAL at 20:25

## 2021-01-01 RX ADMIN — HEPARIN SODIUM 5000 UNITS: 5000 INJECTION INTRAVENOUS; SUBCUTANEOUS at 06:13

## 2021-01-01 RX ADMIN — ACETAMINOPHEN 650 MG: 325 TABLET ORAL at 13:41

## 2021-01-01 RX ADMIN — FERROUS SULFATE TAB 325 MG (65 MG ELEMENTAL FE) 324 MG: 325 (65 FE) TAB at 09:45

## 2021-01-01 RX ADMIN — LACTULOSE 30 G: 20 SOLUTION ORAL at 20:27

## 2021-01-01 RX ADMIN — ACETAMINOPHEN 650 MG: 325 TABLET ORAL at 20:25

## 2021-01-01 RX ADMIN — Medication 1 TABLET: at 09:45

## 2021-01-01 ASSESSMENT — PAIN DESCRIPTION - ORIENTATION: ORIENTATION: RIGHT;LEFT

## 2021-01-01 ASSESSMENT — PAIN SCALES - GENERAL
PAINLEVEL_OUTOF10: 4
PAINLEVEL_OUTOF10: 4

## 2021-01-01 ASSESSMENT — PAIN DESCRIPTION - PAIN TYPE: TYPE: CHRONIC PAIN

## 2021-01-01 ASSESSMENT — PAIN DESCRIPTION - LOCATION
LOCATION: LEG;HIP
LOCATION: LEG
LOCATION: LEG

## 2021-01-01 ASSESSMENT — PAIN DESCRIPTION - DESCRIPTORS: DESCRIPTORS: ACHING;DISCOMFORT

## 2021-01-01 ASSESSMENT — PAIN DESCRIPTION - PROGRESSION: CLINICAL_PROGRESSION: NOT CHANGED

## 2021-01-01 ASSESSMENT — PAIN DESCRIPTION - FREQUENCY: FREQUENCY: CONTINUOUS

## 2021-01-01 NOTE — PROGRESS NOTES
Physical Therapy  Facility/Department: [facility]  Daily Treatment Note  NAME: Kailee Kent  : 1990  MRN: [id number]    Date of Service: 2021    Discharge Recommendations:  24 hour supervision or assist, Outpatient PT   PT Equipment Recommendations  Other: CTA    Assessment   Body structures, Functions, Activity limitations: Decreased functional mobility ; Decreased sensation; Increased pain;Decreased balance;Decreased strength;Decreased safe awareness;Decreased endurance  Assessment: Pt demonstrating improvement with gait distance this session and overall activity tolerance. Pt limited this session by leg and L foot pain. Will continue to progress mobility as pt tolerates. Treatment Diagnosis: impaired endurance and functional mobility. Prognosis: Good  PT Education: Goals;PT Role;Plan of Care;Transfer Training;Equipment;Gait Training;General Safety; Functional Mobility Training  REQUIRES PT FOLLOW UP: Yes  Activity Tolerance  Activity Tolerance: Patient Tolerated treatment well     Patient Diagnosis(es): There were no encounter diagnoses. has a past medical history of Acute respiratory failure ([facility] ETOH abuse, Hemodialysis patient ([facility] [address] Hep C w/o coma, chronic ([facility] History of blood transfusion, Opiate dependence, continuous ([facility] [address] and Thyroid disease. has no past surgical history on file. Restrictions  Restrictions/Precautions  Restrictions/Precautions: General Precautions, Fall Risk  Required Braces or Orthoses?: No  Position Activity Restriction  Other position/activity restrictions: L foot drop  Subjective   General  Chart Reviewed: Yes  Additional Pertinent Hx: PMH liver hepatitis, pancreatitis, liver cirrhosis, ascites, portal hypertension, neuropathy, due to increased alcohol abuse  Family / Caregiver Present: No  Referring Practitioner: Dr Tracy Park  Subjective  Subjective: Pt agreeable to therapy. Reports that her legs are very sore from all the activity yesterday.   General Comment  Comments: Pt sitting EOB upon arrival.  Pain Screening  Patient Currently in Pain: Yes  Pain Assessment  Pain Assessment: 0-10  Pain Level: 4  Pain Location: Leg  Pain Orientation: Right;Left  Vital Signs  Patient Currently in Pain: Yes       Objective   Bed mobility  Supine to Sit: Unable to assess  Sit to Supine: Unable to assess  Comment: sitting EOB upon arrival and exit  Transfers  Sit to Stand: Contact guard assistance  Stand to sit: Contact guard assistance  Comment: 5 reps sit-stands from EOB with use of UEs  Ambulation  Ambulation?: Yes  Ambulation 1  Surface: level tile  Device: Rolling Walker  Assistance: Contact guard assistance  Quality of Gait: step through gait pattern, decreased step length, L toe drag, decreased ronald  Distance: 175'  Ambulation 2  Surface - 2: level tile  Device 2: No device  Assistance 2: Contact guard assistance  Quality of Gait 2: wide ROLO, decreased step length, decreased heel strike LLE, steady without loss of balance  Distance: 110' and 79'  Comments: distance limited by pain in L foot  Stairs/Curb  Stairs?: Yes  Stairs  # Steps : 5  Stairs Height: 4\"  Rails: (RW)  Assistance: Contact guard assistance  Comment: steps up onto 4\" step (descends backwards) x5 reps; step ups on 6\" step x3 reps     Balance  Comments: Standing balance min A without device  Exercises  Hip Flexion: x15 BLE  Hip Abduction: x15 BLE  Knee Long Arc Quad: x15 BLE  Ankle Pumps: x15 BLE - heel/toe raises - assist for DF on LLE         Goals  Short term goals  Time Frame for Short term goals: Goals to be met by 01/04/21  Short term goal 1: Pt to perform bed mobility.  independently  Short term goal 2: Pt to perform sit to/from stand to LRAD SBA  Short term goal 3: Pt to perform gait X 100 ft with LRAD CGA  Short term goal 4: Pt to go up and down 8 steps with 1 HR CGA  Long term goals  Time Frame for Long term goals : Goals to be met by 01/07/21  Long term goal 1: Pt to perform sit to/from stand

## 2021-01-01 NOTE — PROGRESS NOTES
Occupational Therapy  Facility/Department: [facility]  Daily Treatment Note  NAME: Patricia Johnson  : 1990  MRN: [id number]    Date of Service: 2021    Discharge Recommendations:  24 hour supervision or assist, Outpatient OT  OT Equipment Recommendations  Equipment Needed: Yes  Mobility Devices: ADL Assistive Devices  ADL Assistive Devices: Shower Chair without back    Assessment   Performance deficits / Impairments: Decreased functional mobility ; Decreased endurance;Decreased coordination;Decreased ADL status; Decreased ROM; Decreased balance;Decreased strength;Decreased safe awareness;Decreased cognition;Decreased fine motor control;Decreased high-level IADLs;Decreased posture  Assessment: Patient pleasant and agreeable, requesting to change clothing and pleasantly declined shower 2/2 fatigue. Pt was able to ambulate in room/bathroom with CGA-SBA, multiple standing rest breaks, intermittent cues for safety/sequencing. Grossly CGA for LB dressing, setup for UE ADLs and SPV for grooming standing sinkside. Pt completed BUE ex 2# free weights 2x10 reps and rest breaks PRN. Pt did demo increased SOB and HR taken, 115 but able to recover to 105 with rest.  Pt with tremors, requesting OT to assist with filling out menu. Pt was able to attempt to write 2 words for breakfast but fatigued and then requesting OT to assist.  Ptw as able to properly ID day for specials and scan L vs R with increased time to ID items to place on menu request form. Pt was able to open containers (pepsi, use phone) with fair coordination. Fatigues quickly and does need education on energy conservation techniques. Cont OT POC. Prognosis: Good  OT Education: OT Role;Orientation;Plan of Care;Equipment;Precautions; ADL Adaptive Strategies;Transfer Training  Patient Education: disease specific regarding [address], tremors, ADL re-education, ther ex  Barriers to Learning: Pt verbalized understanding but may require reinforcement.   REQUIRES OT FOLLOW UP: Yes  Activity Tolerance  Activity Tolerance: Patient Tolerated treatment well  Safety Devices  Safety Devices in place: Yes  Type of devices: Call light within reach;Nurse notified;Gait belt;Left in chair;Chair alarm in place; Patient at risk for falls; All fall risk precautions in place  Restraints  Initially in place: No         Patient Diagnosis(es): There were no encounter diagnoses. has a past medical history of Acute respiratory failure ([facility] [address] ETOH abuse, Hemodialysis patient ([facility] [address] Hep C w/o coma, [facility] History of blood transfusion, Opiate dependence, continuous ([facility] [address] and Thyroid disease. has no past surgical history on file. Restrictions  Restrictions/Precautions  Restrictions/Precautions: General Precautions, Fall Risk  Required Braces or Orthoses?: No  Position Activity Restriction  Other position/activity restrictions: L foot drop  Subjective   General  Chart Reviewed: Yes, Orders, Progress Notes, History and Physical, Labs  Patient assessed for rehabilitation services?: Yes  Additional Pertinent Hx: Hep C, PNA, Alcohol hepatitis, Herion abuse  Family / Caregiver Present: No  Referring Practitioner: Catherine Valderrama MD  Diagnosis: hepatic encephalopathy  Subjective  Subjective: Pt in bed, pleasant, agreeable to OT session. Declining shower due to fatigue. Agreeable to sitting up if OT found a recliner. Pain Assessment  Pain Assessment: 0-10  Pain Level: 6  Pain Type: Chronic pain  Pain Location: Leg;Hip  Pain Orientation: Right;Left;Lower  Pain Descriptors: Aching;Discomfort  Pain Frequency: Continuous  Pain Onset: On-going  Clinical Progression: Not changed  Functional Pain Assessment: Activities are not prevented  Non-Pharmaceutical Pain Intervention(s): Ambulation/Increased Activity; Heat applied  Response to Pain Intervention: Patient Satisfied  Pre Treatment Pain Screening  Intervention List: Patient able to continue with treatment  Vital Signs  Patient Currently in Pain: Yes   Orientation  Orientation  Overall Orientation Status: Within Functional Limits  Objective    ADL  Feeding: Independent; Beverage management  Grooming: Supervision(standing sinkside to brush hair/teeth and wash face)  UE Dressing: Setup(sitting in recliner)  LE Dressing: Contact guard assistance(CGA for standing to tie pants)  Toileting: Contact guard assistance(CGA for standing to tie pants)        Balance  Sitting Balance: Supervision  Standing Balance: Contact guard assistance(SBA-CGA)  Standing Balance  Time: x3 minutes, 2x1-2 minutes, x3 minutes, x2 minutes, 3x30 seconds  Activity: tranfsers, mobility, ADLs  Comment: no device, mod cues at times  Functional Mobility  Functional - Mobility Device: No device  Activity: To/From therapy gym;Retrieve items;Transport items  Toilet Transfers  Toilet - Technique: Ambulating  Equipment Used: Standard toilet  Toilet Transfer: Stand by assistance  Toilet Transfers Comments: SBA from toilet  Bed mobility  Rolling to Left: Supervision  Rolling to Right: Supervision  Supine to Sit: Supervision  Sit to Supine: Unable to assess  Scooting: Supervision  Transfers  Sit to stand: Supervision  Stand to sit: Supervision  Transfer Comments: SBA with no device                       Cognition  Overall Cognitive Status: Exceptions  Arousal/Alertness: Appropriate responses to stimuli  Following Commands: Follows multistep commands with repitition; Follows multistep commands with increased time  Attention Span: Attends with cues to redirect  Memory: Decreased recall of recent events;Decreased short term memory  Safety Judgement: Decreased awareness of need for assistance  Problem Solving: Assistance required to generate solutions  Insights: Decreased awareness of deficits  Initiation: Does not require cues  Sequencing: Requires cues for some     Perception  Overall Perceptual Status: WFL              Type of ROM/Therapeutic Exercise  Comment: 2# seated in recliner  Exercises  Shoulder Flexion: 2x10 2#  Shoulder Extension: 2x10 2#  Horizontal ABduction: 2x10 2#  Horizontal ADduction: 2x10 2#  Elbow Flexion: 2x10 2#  Elbow Extension: 2x10 2#  Supination: 2x10 2#  Pronation: 2x10 2#  Wrist Flexion: 2x10 2#  Wrist Extension: 2x10 2#  Other: 2x10 2# chest press and circles standing with rest breaks between                    Plan   Plan  Times per week: 5/7 days/week  Plan weeks: 10 days  Current Treatment Recommendations: Strengthening, Patient/Caregiver Education & Training, Home Management Training, Equipment Evaluation, Education, & procurement, ROM, Balance Training, Functional Mobility Training, Endurance Training, Safety Education & Training, Self-Care / ADL  Goals  Short term goals  Time Frame for Short term goals: 1/2/21- 5 days  Short term goal 1: Pt will complete toileting with SBA. -GOAL MET, SBA with RW 1/1  Short term goal 2: Pt will perform functional transfers with SBA. - progressing 1/1, CGA  Long term goals  Time Frame for Long term goals : 1/7/21- 10 days  Long term goal 1: Pt will perform functional transfers with supervision. Long term goal 2: Pt will complete full body bathing with setup. Long term goal 3: Pt will perform full body dressing with setup. Long term goal 4: Pt will complete grooming with mod I.  Long term goal 5: Pt will perform simple homemaking task with supervision.   Patient Goals   Patient goals : \"get better, get stronger, more independent\"       Therapy Time   Individual Concurrent Group Co-treatment   Time In 1030         Time Out 1130         Minutes 60         Timed Code Treatment Minutes: [address], OTR/L

## 2021-01-01 NOTE — PROGRESS NOTES
Speech Language Pathology  [facility]: ACUTE REHAB UNIT  SPEECH-LANGUAGE PATHOLOGY      [x] Daily  [] Weekly Care Conference Note  [] Discharge    Patient:Verna Lipscomb      :1990  HXY:[id number]  Rehab Dx/Hx: Hepatic encephalopathy ([facility] [address] [K72.90]    Precautions: falls  Home situation: Lives at home with fiance and [de-identified] year old, does not work.   ST Dx: [] Aphasia  [] Dysarthria  [] Apraxia   [] Oropharyngeal dysphagia [x] Cognitive Impairment  [x] Other: Voice  Date of Admit: 2020  Room #: 1501/2631-23    Current functional status (updated daily):         Pt being seen for : [x] Speech/Language Treatment  [] Dysphagia Treatment [x] Cognitive Treatment  [] Other:  Communication: [x]WFL  [] Aphasia  [] Dysarthria  [] Apraxia  [] Pragmatic Impairment [] Non-verbal  [] Hearing Loss  [] Other:   Cognition: [] WFL  [] Mild  [x] Moderate  [x] Severe [] Unable to Assess  [] Other:  Memory: [] WFL  [] Mild  [x] Moderate  [x] Severe [] Unable to Assess  [] Other:  Behavior: [x] Alert  [x] Cooperative  [x]  Pleasant  [] Confused  [] Agitated  [] Uncooperative  [] Distractible [] Motivated  [] Self-Limiting [] Anxious  [] Other:  Endurance:  [x] Adequate for participation in SLP sessions  [] Reduced overall  [] Lethargic  [] Other:  Safety: [] No concerns at this time  [x] Reduced insight into deficits  [x]  Reduced safety awareness [] Not following call light procedures  [] Unable to Assess  [] Other:    Current Diet Order:DIET GENERAL;  Dietary Nutrition Supplements: Frozen Oral Supplement  Swallowing Precautions: HOB 90* and 30\" after meals; small bites/sips; alternate solids/liquids every 3-5 bites; oral care after every meal        Date: 2021      Tx session 1  [id number] Tx session 2  Al tx needs met session 1   Total Timed Code Min 45    Total Treatment Minutes 60    Individual Treatment Minutes 60    Group Treatment Minutes 0 0   Co-Treat Minutes 0 0   Variance/Reason:  0    Pain Back and legs at an alarm activated  [] Bed alarm activated  [] Other:    Assessment: Pt pleasant and cooperative throughout session. Moderate difficulty completing short term recall tasks, requiring mod cues for improvement. Mild difficulty with problem solving related to home scenarios upon discharge. Education provided regarding researching community assistance options. Pt educated regarding diaphragmatic breathing for improved breath support during phonation. Completed sustained phonation task with average time of 6 seconds; mild cues required to increase loudness. Continue goals above. Plan: Continue as per plan of care. Additional Information:     Barriers toward progress: Cognitive deficit, reduced insight into deficits  Discharge recommendations:  [] Home independently  [] Home with assistance []  24 hour supervision  [] ECF [x] Other: See PT/OT  Continued Tx Upon Discharge: ? [] Yes [] No [x] TBD based on progress while on ARU [] Vital Stim indicated [] Other:   Estimated discharge date: TBD    Interventions used this date:  [x] Speech/Language Treatment  [] Instruction in HEP [] Group [] Dysphagia Treatment [x] Cognitive Treatment   [] Other:       Total Time Breakdown / Charges    Time in Time out Total Time / units   Cognitive Tx 1315 1400 45 min / 3 units   Speech Tx 1300 1315 15 min / 1 unit   Dysphagia Tx -- -- --       Electronically Signed by  Ayaka Barakat MA, [address] Pathologist  [id number]

## 2021-01-01 NOTE — FLOWSHEET NOTE
12/31/20 1420 12/31/20 1730   Vital Signs   Temp 97.9 °F (36.6 °C) 98.4 °F (36.9 °C)   Pulse 89 117   /73 98/62   Height and Weight   Weight 117 lb 8.1 oz (53.3 kg) 117 lb 8.1 oz (53.3 kg)     Tx completed. No fluid removal per MD order. BP stayed 80-90 inrtra tx. Aranesp 40mcg IV given.

## 2021-01-01 NOTE — PROGRESS NOTES
Verna Cullen Constable  1/1/2021  [id number]    Chief Complaint: Hepatic encephalopathy ([address]    Subjective:   Ammonia trending up, but has only gotten a few doses of lactulose. Dose increased today. ROS: No n/v, cp, sob, f/c  Objective:  Patient Vitals for the past 24 hrs:   BP Temp Temp src Pulse Resp SpO2 Weight   01/01/21 1200 120/75 -- -- 112 -- -- --   01/01/21 0930 112/72 97.6 °F (36.4 °C) Oral 113 16 99 % --   12/31/20 2030 101/66 98.2 °F (36.8 °C) Oral 85 16 95 % --   12/31/20 1730 98/62 98.4 °F (36.9 °C) -- 117 16 -- 117 lb 8.1 oz (53.3 kg)     Gen: No distress, pleasant. HEENT: Normocephalic, atraumatic. CV: Regular rate and rhythm. Resp: No respiratory distress. Abd: Soft, nontender   Ext: No edema. Neuro: Alert, oriented, appropriately interactive.    Wt Readings from Last 3 Encounters:   12/31/20 117 lb 8.1 oz (53.3 kg)   06/17/20 124 lb (56.2 kg)   06/28/16 124 lb (56.2 kg)       Laboratory data:   Lab Results   Component Value Date    WBC 11.7 (H) 12/31/2020    HGB 7.2 (L) 12/31/2020    HCT 22.9 (L) 12/31/2020    MCV 91.4 12/31/2020     (L) 12/31/2020       Lab Results   Component Value Date     01/01/2021    K 2.3 01/01/2021    K 3.6 06/19/2020    CL 95 01/01/2021    CO2 20 01/01/2021    BUN 9 01/01/2021    CREATININE 2.6 01/01/2021    GLUCOSE 143 01/01/2021    CALCIUM 8.0 01/01/2021        Therapy progress:  PT  Position Activity Restriction  Other position/activity restrictions: L foot drop  Objective     Sit to Stand: Contact guard assistance  Stand to sit: Contact guard assistance  Bed to Chair: Contact guard assistance  Device: Rolling Walker  Assistance: Contact guard assistance  Distance: 175'  OT  PT Equipment Recommendations  Other: CTA  Toilet - Technique: Ambulating  Equipment Used: Standard toilet  Toilet Transfers Comments: SBA from toilet  Assessment        SLP  Current Diet : Regular  Current Liquid Diet : Thin  Diet Solids Recommendation: Regular  Liquid Consistency Recommendation: Thin    Body mass index is 18.97 kg/m². Rehabilitation Diagnosis:  Neurologic, 3.8, Neuromuscular Disorders, e.g. Critical Illness Myopathy, Other Myopathy     Assessment and Plan:  Alcoholic hepatitis with hepatic encephalopathy. Rifaximin discontinued. Increased lactulose, follow ammonia daily, wean lactulose down as able. Outpatient follow up with hepatology.      Dysphagia. SLP     MRSA pneumonia with resp failure. Abx completed. Pulm toilet, volume expansion.        Renal failure, hepatorenal vs ATN, now on HD. Nephrology consulted.      Electrolyte abnormalities. Nephrology consulted for HD.      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 Gabapentin; increased dose yesterday with improvement in symptoms.      Bladder: Check PVR x 3. [facility] if PVR > 200ml or if any volume is > 500 ml.      Sleep: Trazodone provided prn.      Follow up appointments: GI/hepatology, PCP    Marlene José MD 1/1/2021, 2:43 PM

## 2021-01-02 LAB
ALBUMIN SERPL-MCNC: 2.2 G/DL (ref 3.4–5)
AMMONIA: 99 UMOL/L (ref 11–51)
ANION GAP SERPL CALCULATED.3IONS-SCNC: 14 MMOL/L (ref 3–16)
BUN BLDV-MCNC: 10 MG/DL (ref 7–20)
CALCIUM SERPL-MCNC: 8.2 MG/DL (ref 8.3–10.6)
CHLORIDE BLD-SCNC: 97 MMOL/L (ref 99–110)
CO2: 19 MMOL/L (ref 21–32)
CREAT SERPL-MCNC: 3.6 MG/DL (ref 0.6–1.1)
GFR AFRICAN AMERICAN: 18
GFR NON-AFRICAN AMERICAN: 15
GLUCOSE BLD-MCNC: 135 MG/DL (ref 70–99)
MAGNESIUM: 1.3 MG/DL (ref 1.8–2.4)
PHOSPHORUS: 2.1 MG/DL (ref 2.5–4.9)
POTASSIUM SERPL-SCNC: 3.1 MMOL/L (ref 3.5–5.1)
SODIUM BLD-SCNC: 130 MMOL/L (ref 136–145)

## 2021-01-02 PROCEDURE — 97129 THER IVNTJ 1ST 15 MIN: CPT

## 2021-01-02 PROCEDURE — 6370000000 HC RX 637 (ALT 250 FOR IP): Performed by: PHYSICAL MEDICINE & REHABILITATION

## 2021-01-02 PROCEDURE — 97116 GAIT TRAINING THERAPY: CPT

## 2021-01-02 PROCEDURE — 80069 RENAL FUNCTION PANEL: CPT

## 2021-01-02 PROCEDURE — 36415 COLL VENOUS BLD VENIPUNCTURE: CPT

## 2021-01-02 PROCEDURE — 92507 TX SP LANG VOICE COMM INDIV: CPT

## 2021-01-02 PROCEDURE — 1280000000 HC REHAB R&B

## 2021-01-02 PROCEDURE — 82140 ASSAY OF AMMONIA: CPT

## 2021-01-02 PROCEDURE — 6370000000 HC RX 637 (ALT 250 FOR IP): Performed by: INTERNAL MEDICINE

## 2021-01-02 PROCEDURE — 97530 THERAPEUTIC ACTIVITIES: CPT

## 2021-01-02 PROCEDURE — 97130 THER IVNTJ EA ADDL 15 MIN: CPT

## 2021-01-02 PROCEDURE — 97535 SELF CARE MNGMENT TRAINING: CPT

## 2021-01-02 PROCEDURE — 97110 THERAPEUTIC EXERCISES: CPT

## 2021-01-02 PROCEDURE — 83735 ASSAY OF MAGNESIUM: CPT

## 2021-01-02 PROCEDURE — 6360000002 HC RX W HCPCS: Performed by: PHYSICAL MEDICINE & REHABILITATION

## 2021-01-02 RX ADMIN — FOLIC ACID 1 MG: 1 TABLET ORAL at 10:14

## 2021-01-02 RX ADMIN — GABAPENTIN 100 MG: 100 CAPSULE ORAL at 20:03

## 2021-01-02 RX ADMIN — MIDODRINE HYDROCHLORIDE 10 MG: 5 TABLET ORAL at 10:17

## 2021-01-02 RX ADMIN — LACTULOSE 30 G: 20 SOLUTION ORAL at 14:24

## 2021-01-02 RX ADMIN — TORSEMIDE 60 MG: 20 TABLET ORAL at 10:13

## 2021-01-02 RX ADMIN — FERROUS SULFATE TAB 325 MG (65 MG ELEMENTAL FE) 324 MG: 325 (65 FE) TAB at 10:14

## 2021-01-02 RX ADMIN — LACTULOSE 30 G: 20 SOLUTION ORAL at 20:02

## 2021-01-02 RX ADMIN — POTASSIUM CHLORIDE 40 MEQ: 20 TABLET, EXTENDED RELEASE ORAL at 10:15

## 2021-01-02 RX ADMIN — GABAPENTIN 100 MG: 100 CAPSULE ORAL at 14:22

## 2021-01-02 RX ADMIN — MIDODRINE HYDROCHLORIDE 10 MG: 5 TABLET ORAL at 17:43

## 2021-01-02 RX ADMIN — THIAMINE HCL TAB 100 MG 100 MG: 100 TAB at 10:13

## 2021-01-02 RX ADMIN — HEPARIN SODIUM 5000 UNITS: 5000 INJECTION INTRAVENOUS; SUBCUTANEOUS at 14:20

## 2021-01-02 RX ADMIN — ACETAMINOPHEN 650 MG: 325 TABLET ORAL at 20:02

## 2021-01-02 RX ADMIN — CAPSAICIN: 0.25 CREAM TOPICAL at 20:02

## 2021-01-02 RX ADMIN — Medication 1 CAPSULE: at 10:15

## 2021-01-02 RX ADMIN — HEPARIN SODIUM 5000 UNITS: 5000 INJECTION INTRAVENOUS; SUBCUTANEOUS at 06:24

## 2021-01-02 RX ADMIN — ACETAMINOPHEN 650 MG: 325 TABLET ORAL at 10:16

## 2021-01-02 RX ADMIN — Medication 1 TABLET: at 10:14

## 2021-01-02 RX ADMIN — GABAPENTIN 100 MG: 100 CAPSULE ORAL at 10:17

## 2021-01-02 RX ADMIN — POTASSIUM CHLORIDE 40 MEQ: 20 TABLET, EXTENDED RELEASE ORAL at 20:03

## 2021-01-02 RX ADMIN — HEPARIN SODIUM 5000 UNITS: 5000 INJECTION INTRAVENOUS; SUBCUTANEOUS at 20:03

## 2021-01-02 RX ADMIN — ONDANSETRON 4 MG: 4 TABLET, ORALLY DISINTEGRATING ORAL at 10:16

## 2021-01-02 RX ADMIN — POTASSIUM CHLORIDE 40 MEQ: 20 TABLET, EXTENDED RELEASE ORAL at 14:22

## 2021-01-02 RX ADMIN — LACTULOSE 30 G: 20 SOLUTION ORAL at 10:18

## 2021-01-02 RX ADMIN — CAPSAICIN: 0.25 CREAM TOPICAL at 10:22

## 2021-01-02 RX ADMIN — ONDANSETRON 4 MG: 4 TABLET, ORALLY DISINTEGRATING ORAL at 06:26

## 2021-01-02 RX ADMIN — MIDODRINE HYDROCHLORIDE 10 MG: 5 TABLET ORAL at 14:22

## 2021-01-02 ASSESSMENT — PAIN DESCRIPTION - ORIENTATION
ORIENTATION: RIGHT;LEFT

## 2021-01-02 ASSESSMENT — PAIN DESCRIPTION - LOCATION: LOCATION: LEG

## 2021-01-02 ASSESSMENT — PAIN DESCRIPTION - PROGRESSION: CLINICAL_PROGRESSION: NOT CHANGED

## 2021-01-02 ASSESSMENT — PAIN DESCRIPTION - PAIN TYPE
TYPE: ACUTE PAIN;CHRONIC PAIN
TYPE: ACUTE PAIN;CHRONIC PAIN

## 2021-01-02 ASSESSMENT — PAIN SCALES - GENERAL: PAINLEVEL_OUTOF10: 5

## 2021-01-02 ASSESSMENT — PAIN DESCRIPTION - DESCRIPTORS
DESCRIPTORS: ACHING
DESCRIPTORS: ACHING;BURNING;PINS AND NEEDLES

## 2021-01-02 ASSESSMENT — PAIN DESCRIPTION - FREQUENCY: FREQUENCY: CONTINUOUS

## 2021-01-02 NOTE — PROGRESS NOTES
Speech Language Pathology  [facility]: ACUTE REHAB UNIT  SPEECH-LANGUAGE PATHOLOGY      [x] Daily  [] Weekly Care Conference Note  [] Discharge    Patient:Verna Bass      :1990  [facility]:[id number]  Rehab Dx/Hx: Hepatic encephalopathy ([facility] [address] [K72.90]    Precautions: falls  Home situation: Lives at home with fiance and [de-identified] year old, does not work.   ST Dx: [] Aphasia  [] Dysarthria  [] Apraxia   [] Oropharyngeal dysphagia [x] Cognitive Impairment  [x] Other: Voice  Date of Admit: 2020  Room #: 6708/1972-51    Current functional status (updated daily):         Pt being seen for : [x] Speech/Language Treatment  [] Dysphagia Treatment [x] Cognitive Treatment  [x] Other: voice tx  Communication: [x]WFL  [] Aphasia  [] Dysarthria  [] Apraxia  [] Pragmatic Impairment [] Non-verbal  [] Hearing Loss  [] Other:   Cognition: [] WFL  [] Mild  [x] Moderate  [x] Severe [] Unable to Assess  [] Other:  Memory: [] WFL  [] Mild  [x] Moderate  [x] Severe [] Unable to Assess  [] Other:  Behavior: [x] Alert  [x] Cooperative  [x]  Pleasant  [] Confused  [] Agitated  [] Uncooperative  [] Distractible [] Motivated  [] Self-Limiting [] Anxious  [] Other:  Endurance:  [x] Adequate for participation in SLP sessions  [] Reduced overall  [] Lethargic  [] Other:  Safety: [] No concerns at this time  [x] Reduced insight into deficits  [x]  Reduced safety awareness [] Not following call light procedures  [] Unable to Assess  [] Other:    Current Diet Order:DIET GENERAL;  Dietary Nutrition Supplements: Frozen Oral Supplement  Swallowing Precautions: HOB 90* and 30\" after meals; small bites/sips; alternate solids/liquids every 3-5 bites; oral care after every meal        Date: 2021      Tx session 1  [id number] Tx session 2  Al tx needs met session 1   Total Timed Code Min 45    Total Treatment Minutes 60    Individual Treatment Minutes 60    Group Treatment Minutes 0    Co-Treat Minutes 0    Variance/Reason:  0    Pain 8-stomach/leg Pain Intervention [x] RN notified  [] Repositioned  [x] Intervention offered and patient declined  [] N/A  [x] Other: pt reported recently receiving pain management by RN [] RN notified  [] Repositioned  [] Intervention offered and patient declined  [] N/A  [] Other:   Subjective     Pt alert and agreeable to tx; seen sitting upright in bed. Objective:  Goals     Short-term Goals  Timeframe for Short-term Goals: 7 days (01/05/21)    Goal 1: Pt will complete recall tasks with 80% accuracy given mod cues. Immediate recall of five related words: 87% accy, indep    Goal 2: Pt will complete executive function tasks (meds,money, math, time, etc) with 80% accuracy given mod cues. Time (email inbox): 75% accy indep, increasing to 100% accy given min cues    -Pt reported use of walker to ambulate when d/c from hospital.   -Reported will need someone to assist with cleaning up the floors to assist with mobility and won't trip/fall.  -Pt reported will avoid driving and laundry (needing to go up ~20 unsteady steps). -Pt reporting needing to make future apts with ENT, podiatrist, PCP, and liver doctor    Goal 3: Pt will complete problem solving and thought organization tasks with 80% accuracy given mod cues. Mental manipulation: Ranking of three words: 94% accy, indep    Problem solving given picture scenes (label problem and rationale): 100% accy, indep    Goal 4: Pt will complete verbal and visual reasoning tasks with 80% accuracy given mod cues. Visual reasoning: Pt given chart with two rows and four columns and asked questions related to chart: 80% accy, indep    Goal 5: Pt will participate in vocal strengthening exercises for improved vocal quality with min cues. Vocal hygiene edu provided    Completed:  -buzz /m/: x5  -me x5  -my: x5    Goal 6: Pt will increase maximum phonation time to > 8 seconds. Average MPT of /a/: 5 seconds    Pt reports \"running out of air\" when speaking.  Edu speaking in [facility] groups\" and practicing diaphragmatic breathing daily    Edu re: diaphragmatic breathing exercises and x10 complete         Timeframe for Short-term Goals: 7 days (01/05/21)         Other areas targeted: NA      Education:   Pt educated regarding voice deficits post extubation and diaphragmatic breathing. Safety Devices: [x] Call light within reach  [] Chair alarm activated  [x] Bed alarm activated  [x] Other: pt's belongings within reach [] Call light within reach  [] Chair alarm activated  [] Bed alarm activated  [] Other:    Assessment: Pt pleasant and cooperative throughout session. Pt completed immediate recall, thought organization, problem solving and visual reasoning tasks with improved accy this date. Pt edu WRAP strategy to increase recall of newly learned tasks/stimuli. Pt reported increased difficulty in reporting a solution to cooking while using walker. Pt reports has a chair in the kitchen, but is too high and her feet would not touch. Pt with increased ability to problem solve d/c planning scenarios. Pt edu diaphragmatic breathing exercises, vocal hygiene, and speaking in breath groups (a couple words per breath). Completed sustained phonation task with average time of 5 seconds for /a/; mincues required to increase loudness. Continue ST POC. Plan: Continue as per plan of care.       Additional Information:     Barriers toward progress: Cognitive deficit, reduced insight into deficits  Discharge recommendations:  [] Home independently  [] Home with assistance []  24 hour supervision  [] ECF [x] Other: See PT/OT  Continued Tx Upon Discharge: ? [] Yes [] No [x] TBD based on progress while on ARU [] Vital Stim indicated [] Other:   Estimated discharge date: TBD    Interventions used this date:  [x] Speech/Language Treatment  [] Instruction in HEP [] Group [] Dysphagia Treatment [x] Cognitive Treatment   [x] Other: voice      Total Time Breakdown / Charges    Time in Time out Total Time / units Cognitive Tx 1030 1115 45 min / 3 units   Speech Tx 1115 1130 15 min / 1 unit   Dysphagia Tx -- -- --       Electronically Signed by  Deondre Larson M.S., [facility]   Speech Language Pathologist  [id number]

## 2021-01-02 NOTE — PROGRESS NOTES
Progress Note    HISTORY     CC:  Respiratory Failure          We are following for acute kidney injury       Subjective/   HPI:  K is coming up. Still with tremor. Says she had that before she was started on gabapentin. Otherwise she is doing ok.       ROS:  Constitutional:  No fevers, No Chills, + weakness  Cardiovascular:  No palpations, + edema  Respiratory:  No wheezing, no cough  Skin:  No rash, no itching  :  No hematuria, no dysuria     Social Hx:  No family at bedside     Past Medical and Surgical History:  - Reviewed, no changes     EXAM       Objective/     Vitals:    01/01/21 0930 01/01/21 1200 01/01/21 2025 01/02/21 1000   BP: 112/72 120/75 110/65 119/77   Pulse: 113 112 106 107   Resp: 16  16 16   Temp: 97.6 °F (36.4 °C)  99 °F (37.2 °C) 98 °F (36.7 °C)   TempSrc: Oral  Oral Oral   SpO2: 99%  98%    Weight:       Height:         24HR INTAKE/OUTPUT:      Intake/Output Summary (Last 24 hours) at 1/2/2021 1601  Last data filed at 1/2/2021 6778  Gross per 24 hour   Intake 480 ml   Output --   Net 480 ml     Constitutional:  Alert  Eyes:  Pupils reactive, sclera clear   Neck:  Normal thyroid, no masses   Cardiovascular:  Regular, no rub  Respiratory:  No distress, no wheezing  Psychiatry:  Flat mood/affect, alert  Abdomen: +bs, soft, nt, no masses   Musculoskeletal: + LE edema, no clubbing   Lymphatics:  No LAD in neck, no supraclavicular nodes   Access:  [facility]    MEDICAL DECISION MAKING       Data/  Recent Labs     12/31/20  1425   WBC 11.7*   HGB 7.2*   HCT 22.9*   MCV 91.4   *     Recent Labs     12/31/20  1425 01/01/21  1201 01/02/21  1226   * 130* 130*   K 2.7* 2.3* 3.1*   CL 86* 95* 97*   CO2 21 20* 19*   GLUCOSE 99 143* 135*   PHOS 4.6 2.1* 2.1*   MG  --  1.60* 1.30*   BUN 27* 9 10   CREATININE 5.1* 2.6* 3.6*   LABGLOM 10* 21* 15*   GFRAA 12* 26* 18*       Assessment/     Acute Kidney Injury:  KDIGO stage 3  - ATN / septic shock in setting of cirrhosis  - Has been on

## 2021-01-02 NOTE — PROGRESS NOTES
Physical Therapy  Facility/Department: [facility]  Daily Treatment Note  NAME: Maurice Valdivia  : 1990  MRN: [id number]    Date of Service: 2021    Discharge Recommendations:  24 hour supervision or assist, Outpatient PT   PT Equipment Recommendations  Other: CTA    Assessment   Body structures, Functions, Activity limitations: Decreased functional mobility ; Decreased sensation; Increased pain;Decreased balance;Decreased strength;Decreased safe awareness;Decreased endurance  Assessment: Pt pleasant and agreeable to PT tx and demonstrates good progress with functional mobility. Pt completes bed mobility with S, t/f with and without AD with SBA and ambulates up to 200' with RW with SBA and 120' without AD with CGA. Pt requesting to use RW intially d/t unsteadiness and pain/soreness but agreeable to attempt gait without AD at end of session. Pt is limited by decreased strength, decreased activity tolerance and pain. Pt will benefit from continued skilled PT in ARU to address above deficits, will continue to progress mobility as tolerated. Treatment Diagnosis: impaired endurance and functional mobility. Prognosis: Good  Decision Making: Medium Complexity  PT Education: Goals;PT Role;Plan of Care;Transfer Training;Equipment;Gait Training;General Safety; Functional Mobility Training  Patient Education: Importance of OOB mobility and gait, pt demonstrates understanding  Barriers to Learning: none  REQUIRES PT FOLLOW UP: Yes  Activity Tolerance  Activity Tolerance: Patient Tolerated treatment well  Activity Tolerance: -115 at rest up to 130 with activity     Patient Diagnosis(es): There were no encounter diagnoses. has a past medical history of Acute respiratory failure ([facility] [address] ETOH abuse, Hemodialysis patient ([facility] [address] Hep C w/o coma, chronic ([facility] [address] History of blood transfusion, Opiate dependence, continuous ([facility] [address] and Thyroid disease.    has no past surgical history on file.    Restrictions  Restrictions/Precautions  Restrictions/Precautions: General Precautions, Fall Risk  Required Braces or Orthoses?: No  Position Activity Restriction  Other position/activity restrictions: L foot drop - ace wrap on LLE present (placed during PT session.)  Subjective   General  Chart Reviewed: Yes  Additional Pertinent Hx: PMH liver hepatitis, pancreatitis, liver cirrhosis, ascites, portal hypertension, neuropathy, due to increased alcohol abuse  Response To Previous Treatment: Patient with no complaints from previous session. Family / Caregiver Present: No  Referring Practitioner: Dr Miranda Aguila  Subjective  Subjective: Pt supine in bed on approach, reports abdominal pain and LLE pain  General Comment  Comments: RN cleared pt for session, reports pt asymptomatic from low K  Pain Screening  Patient Currently in Pain: Yes  Pain Assessment  Pain Assessment: 0-10  Pain Level: 8(8 in LLE, 6 in abdomen)  Pain Type: Acute pain;Chronic pain  Pain Location: Leg;Hip  Pain Orientation: Right;Left  Pain Descriptors: Aching  Non-Pharmaceutical Pain Intervention(s): Ambulation/Increased Activity;Repositioned; Therapeutic presence;Distraction  Vital Signs  Patient Currently in Pain: Yes       Orientation  Orientation  Overall Orientation Status: Within Normal Limits    Objective   Bed mobility  Supine to Sit: Supervision(HOB elevated, use of BR)  Sit to Supine: Unable to assess(Pt seated in recliner at end of session)  Transfers  Sit to Stand: Stand by assistance  Stand to sit: Stand by assistance  Comment: EOB to RW SBA, commode to RW with grab bar SBA, EOB no AD SBA, Standard chair to RW no AD completed x 5 reps, x 5 reps consecutive sit to/from stand standard chair no AD x 5 reps SBA, intermittent cues for hand placement and sequence when using RW     Ambulation  Ambulation?: Yes  More Ambulation?: Yes  Ambulation 1  Surface: level tile  Device: Rolling Walker  Assistance: Contact guard assistance;Stand by assistance  Quality of Gait: Step through gait, B decreased step height, LLE foot drop, LLE mild steppage gait, wide ROLO. Pt mildly unsteady no overt LOB. Gait Deviations: Slow Rita;Decreased step length;Decreased step height; Increased ROLO  Distance: 150' + 200' + 15' x 2  Comments: Second bout completed with x 8 reps cone retrieval task and DF assist wrap to LLE, cone retrieval task completed to facilitate increased scanning of enviornment and promote improved safety and performance with gait within the home and community. Ambulation 2  Surface - 2: level tile  Device 2: No device  Other Apparatus 2: (LLE DF assist ace wrap)  Assistance 2: Contact guard assistance  Quality of Gait 2: Step through gait, wide ROLO, B decreased toe clearance with L foot drop (improved with L DF assist wrap), slight steppage gait LLE, decreased arm swing, increased postural sway. Pt mildly unsteady demonstrating one LOB with CGA to correct. Gait Deviations: Slow Rita;Decreased step length;Decreased step height; Increased ROLO; Decreased arm swing  Distance: 120'  Ambulation 3  Surface - 3: level tile  Device 3: Rolling Walker  Assistance 3: Contact guard assistance  Quality of Gait 3: Pt completes cone gait task stepping over cones in reciprocal pattern with cues to increase hip flexion and promote heel strike pattern. Pt mildly unsteady and increasing circumduction with fatigue. Frequent cues for sequence  Gait Deviations: Slow Rita;Decreased step length;Decreased step height; Increased ROLO  Distance: 15' x 4  Comments: Seated rest breaks to recover between bouts  Stairs/Curb  Stairs?: No        Balance  Posture: Fair  Sitting - Static: Good;-  Sitting - Dynamic: Fair;+  Standing - Static: Fair;-  Standing - Dynamic: Poor;+  Comments: Standing toe taps to 6\" step x 2 set x 10 reps, CGA for balance. Completed to facilitate improved stability in SLS and promote increased hip flexion with gait activities.  Seated rest break between bouts to recover from fatigue. Goals  Short term goals  Time Frame for Short term goals: Goals to be met by 01/04/21  Short term goal 1: Pt to perform bed mobility. independently  Short term goal 2: Pt to perform sit to/from stand to LRAD SBA -- GOAL MET SBA with and without AD  Short term goal 3: Pt to perform gait X 100 ft with LRAD CGA -- GOAL MET up to 200' with RW SBA, 100' without AD CGA  Short term goal 4: Pt to go up and down 8 steps with 1 HR CGA  Long term goals  Time Frame for Long term goals : Goals to be met by 01/07/21  Long term goal 1: Pt to perform sit to/from stand with LRAD: Supervision  Long term goal 2: Pt to perform gait X 150 ft with LRAD supervision  Long term goal 3: Pt to perform 2 curb steps with LRAD supervision  Long term goal 4: Pt to perform 8 steps with 1 HR and LRAD if needed Supervision  Patient Goals   Patient goals : \"to get better, to get stronger\"    Plan    Plan  Times per week: 5 out of 7 days  Times per day: Daily  Plan weeks: 10 days  Current Treatment Recommendations: Strengthening, Neuromuscular Re-education, Home Exercise Program, Safety Education & Training, Balance Training, Endurance Training, Patient/Caregiver Education & Training, Functional Mobility Training, Equipment Evaluation, Education, & procurement, Transfer Training, Gait Training, Stair training, Pain Management, Positioning  Safety Devices  Type of devices:  All fall risk precautions in place, Gait belt, Left in bed, Nurse notified, Left in chair, Call light within reach, Chair alarm in place, Patient at risk for falls  Restraints  Initially in place: No     Therapy Time   Individual Concurrent Group Co-treatment   Time In 0730         Time Out 0830         Minutes 60         Timed Code Treatment Minutes: [facility], PT, DPT

## 2021-01-02 NOTE — PROGRESS NOTES
Occupational Therapy  Facility/Department: [facility]  Daily Treatment Note  NAME: Kj Rashid  : 1990  MRN: [id number]    Date of Service: 2021    Discharge Recommendations:  24 hour supervision or assist, Outpatient OT  OT Equipment Recommendations  ADL Assistive Devices: Shower Chair without back    Assessment   Performance deficits / Impairments: Decreased functional mobility ; Decreased endurance;Decreased coordination;Decreased ADL status; Decreased ROM; Decreased balance;Decreased strength;Decreased safe awareness;Decreased cognition;Decreased fine motor control;Decreased high-level IADLs;Decreased posture  Assessment: Pt agreeable to OT, pleasant and cooperative thoughout session. Pt participating in strength and endurance training through particiaption in standing level BADLs and therapeutic exercise. Pt with c/o difficulty eating food with spoon d/t tremors. Will consider trialing wrist weights to dampen tremor. Cont per OT poc. Prognosis: Good  OT Education: OT Role;Orientation;Plan of Care;Equipment;Precautions; ADL Adaptive Strategies;Transfer Training  Patient Education: disease specific regarding [facility], tremors, ADL re-education, ther ex  Barriers to Learning: Pt verbalized understanding but may require reinforcement. REQUIRES OT FOLLOW UP: Yes  Activity Tolerance  Activity Tolerance: Patient Tolerated treatment well  Safety Devices  Type of devices: Left in bed;Bed alarm in place;Call light within reach;Nurse notified         Patient Diagnosis(es): There were no encounter diagnoses. has a past medical history of Acute respiratory failure ([facility] [address] ETOH abuse, Hemodialysis patient ([facility] [address] Hep C w/o coma, chronic ([facility] [address] History of blood transfusion, Opiate dependence, continuous ([facility] [address] and Thyroid disease. has no past surgical history on file.     Restrictions  Restrictions/Precautions  Restrictions/Precautions: General Precautions, Fall Risk  Required Braces or Orthoses?: No  Position Activity Restriction  Other position/activity restrictions: L foot drop - ace wrap on LLE present (placed during PT session.)  Subjective   General  Chart Reviewed: Yes, Orders, Progress Notes, History and Physical, Labs  Patient assessed for rehabilitation services?: Yes  Additional Pertinent Hx: Hep C, PNA, Alcohol hepatitis, Herion abuse  Response to previous treatment: Patient with no complaints from previous session  Family / Caregiver Present: No  Referring Practitioner: Jhon Fairbanks MD  Diagnosis: hepatic encephalopathy  Subjective  Subjective: Pt in bed, pleasant, agreeable to OT session. Declining shower due, despite encouragement. Agreeable to sitting up if OT found a recliner. Pain Assessment  Pain Level: 7  Pain Location: Leg  Pain Orientation: Right;Left  Vital Signs  Patient Currently in Pain: Yes   Orientation  Orientation  Overall Orientation Status: Within Normal Limits  Objective    ADL  Grooming: Supervision(oral care, wash/dry hamds and face in stance at sink)  Additional Comments: Pt declined shower this date. Pt did agree to grooming tasks and able to perform in stance, however propped on elbows for 25% of task. Balance  Sitting Balance: Modified independent   Standing Balance: (Static: SBA. Dynamic: CGA)  Standing Balance  Time: 2-3 min x3 + 5 min  Activity: functional mobility and grooming tasks. Comment: Demo increase in whole body tremors with static standing. Functional Mobility  Functional - Mobility Device: Rolling Walker  Activity: To/From therapy gym; To/from bathroom  Assist Level: Stand by assistance  Functional Mobility Comments: In room, no AD. In coleman and to/from therapy: RW  Bed mobility  Sit to Supine: Supervision  Transfers  Sit to stand: Supervision  Stand to sit: Supervision                       Cognition  Arousal/Alertness: Appropriate responses to stimuli  Following Commands:  Follows multistep commands with increased time  Attention Span: Attends with cues to redirect  Memory: Decreased recall of recent events;Decreased short term memory  Safety Judgement: Decreased awareness of need for assistance  Insights: Decreased awareness of deficits  Initiation: Does not require cues                    Type of ROM/Therapeutic Exercise  Type of ROM/Therapeutic Exercise: Free weights  Comment: 2# seated in gym  Exercises  Scapular Retraction: orange t-band: 15x  Shoulder Elevation: shoulder press, 15x, 2#  Shoulder Flexion: orange t-band: 15x  Shoulder ABduction: 15x, 2#  Horizontal ABduction: orange t-band: 15x  Elbow Flexion: 15x, 2#  Elbow Extension: orange t-band: 15x  Supination: 15x, 2#  Pronation: 15x, 2#  Wrist Extension: 15x, 2#                    Plan   Plan  Times per week: 5/7 days/week  Plan weeks: 10 days  Current Treatment Recommendations: Strengthening, Patient/Caregiver Education & Training, Home Management Training, Equipment Evaluation, Education, & procurement, ROM, Balance Training, Functional Mobility Training, Endurance Training, Safety Education & Training, Self-Care / ADL    Goals  Short term goals  Time Frame for Short term goals: 1/2/21- 5 days  Short term goal 1: Pt will complete toileting with SBA. -GOAL MET, SBA with RW 1/1  Short term goal 2: Pt will perform functional transfers with SBA.- GOAL MET, 1/2  Long term goals  Time Frame for Long term goals : 1/7/21- 10 days  Long term goal 1: Pt will perform functional transfers with supervision. Long term goal 2: Pt will complete full body bathing with setup. Long term goal 3: Pt will perform full body dressing with setup. Long term goal 4: Pt will complete grooming with mod I.  Long term goal 5: Pt will perform simple homemaking task with supervision.   Patient Goals   Patient goals : \"get better, get stronger, more independent\"       Therapy Time   Individual Concurrent Group Co-treatment   Time In 0900         Time Out 1000         Minutes 60         Timed Code Treatment Minutes: 60 Minutes Kailee Meza, OT

## 2021-01-03 LAB
ALBUMIN SERPL-MCNC: 2.2 G/DL (ref 3.4–5)
AMMONIA: 67 UMOL/L (ref 11–51)
ANION GAP SERPL CALCULATED.3IONS-SCNC: 16 MMOL/L (ref 3–16)
BUN BLDV-MCNC: 13 MG/DL (ref 7–20)
CALCIUM SERPL-MCNC: 8.6 MG/DL (ref 8.3–10.6)
CHLORIDE BLD-SCNC: 99 MMOL/L (ref 99–110)
CO2: 17 MMOL/L (ref 21–32)
CREAT SERPL-MCNC: 3.9 MG/DL (ref 0.6–1.1)
GFR AFRICAN AMERICAN: 16
GFR NON-AFRICAN AMERICAN: 13
GLUCOSE BLD-MCNC: 147 MG/DL (ref 70–99)
HCT VFR BLD CALC: 23.2 % (ref 36–48)
HEMOGLOBIN: 7.3 G/DL (ref 12–16)
MCH RBC QN AUTO: 29.2 PG (ref 26–34)
MCHC RBC AUTO-ENTMCNC: 31.6 G/DL (ref 31–36)
MCV RBC AUTO: 92.4 FL (ref 80–100)
PDW BLD-RTO: 17.6 % (ref 12.4–15.4)
PHOSPHORUS: 2.7 MG/DL (ref 2.5–4.9)
PLATELET # BLD: 107 K/UL (ref 135–450)
PLATELET SLIDE REVIEW: ABNORMAL
PMV BLD AUTO: 7.4 FL (ref 5–10.5)
POTASSIUM SERPL-SCNC: 3.6 MMOL/L (ref 3.5–5.1)
RBC # BLD: 2.51 M/UL (ref 4–5.2)
SLIDE REVIEW: ABNORMAL
SODIUM BLD-SCNC: 132 MMOL/L (ref 136–145)
WBC # BLD: 13.1 K/UL (ref 4–11)

## 2021-01-03 PROCEDURE — 6370000000 HC RX 637 (ALT 250 FOR IP): Performed by: INTERNAL MEDICINE

## 2021-01-03 PROCEDURE — 82140 ASSAY OF AMMONIA: CPT

## 2021-01-03 PROCEDURE — 6370000000 HC RX 637 (ALT 250 FOR IP): Performed by: PHYSICAL MEDICINE & REHABILITATION

## 2021-01-03 PROCEDURE — 36415 COLL VENOUS BLD VENIPUNCTURE: CPT

## 2021-01-03 PROCEDURE — 1280000000 HC REHAB R&B

## 2021-01-03 PROCEDURE — 36556 INSERT NON-TUNNEL CV CATH: CPT

## 2021-01-03 PROCEDURE — 80069 RENAL FUNCTION PANEL: CPT

## 2021-01-03 PROCEDURE — 6360000002 HC RX W HCPCS: Performed by: INTERNAL MEDICINE

## 2021-01-03 PROCEDURE — 85027 COMPLETE CBC AUTOMATED: CPT

## 2021-01-03 PROCEDURE — 6360000002 HC RX W HCPCS: Performed by: PHYSICAL MEDICINE & REHABILITATION

## 2021-01-03 PROCEDURE — 90935 HEMODIALYSIS ONE EVALUATION: CPT

## 2021-01-03 RX ADMIN — GABAPENTIN 100 MG: 100 CAPSULE ORAL at 07:49

## 2021-01-03 RX ADMIN — POTASSIUM CHLORIDE 40 MEQ: 20 TABLET, EXTENDED RELEASE ORAL at 07:50

## 2021-01-03 RX ADMIN — FOLIC ACID 1 MG: 1 TABLET ORAL at 07:49

## 2021-01-03 RX ADMIN — LACTULOSE 30 G: 20 SOLUTION ORAL at 07:51

## 2021-01-03 RX ADMIN — GABAPENTIN 100 MG: 100 CAPSULE ORAL at 13:44

## 2021-01-03 RX ADMIN — TORSEMIDE 60 MG: 20 TABLET ORAL at 07:49

## 2021-01-03 RX ADMIN — LACTULOSE 30 G: 20 SOLUTION ORAL at 21:24

## 2021-01-03 RX ADMIN — MIDODRINE HYDROCHLORIDE 10 MG: 5 TABLET ORAL at 13:44

## 2021-01-03 RX ADMIN — ACETAMINOPHEN 650 MG: 325 TABLET ORAL at 21:23

## 2021-01-03 RX ADMIN — CAPSAICIN: 0.25 CREAM TOPICAL at 21:25

## 2021-01-03 RX ADMIN — GABAPENTIN 100 MG: 100 CAPSULE ORAL at 21:24

## 2021-01-03 RX ADMIN — FERROUS SULFATE TAB 325 MG (65 MG ELEMENTAL FE) 324 MG: 325 (65 FE) TAB at 07:49

## 2021-01-03 RX ADMIN — HEPARIN SODIUM 3200 UNITS: 1000 INJECTION INTRAVENOUS; SUBCUTANEOUS at 13:03

## 2021-01-03 RX ADMIN — HEPARIN SODIUM 5000 UNITS: 5000 INJECTION INTRAVENOUS; SUBCUTANEOUS at 06:06

## 2021-01-03 RX ADMIN — MIDODRINE HYDROCHLORIDE 10 MG: 5 TABLET ORAL at 16:51

## 2021-01-03 RX ADMIN — LACTULOSE 30 G: 20 SOLUTION ORAL at 13:44

## 2021-01-03 RX ADMIN — POTASSIUM CHLORIDE 40 MEQ: 20 TABLET, EXTENDED RELEASE ORAL at 13:44

## 2021-01-03 RX ADMIN — HEPARIN SODIUM 5000 UNITS: 5000 INJECTION INTRAVENOUS; SUBCUTANEOUS at 13:45

## 2021-01-03 RX ADMIN — THIAMINE HCL TAB 100 MG 100 MG: 100 TAB at 07:48

## 2021-01-03 RX ADMIN — Medication 1 CAPSULE: at 07:49

## 2021-01-03 RX ADMIN — POTASSIUM CHLORIDE 40 MEQ: 20 TABLET, EXTENDED RELEASE ORAL at 21:24

## 2021-01-03 RX ADMIN — Medication 1 TABLET: at 07:50

## 2021-01-03 RX ADMIN — CAPSAICIN: 0.25 CREAM TOPICAL at 07:52

## 2021-01-03 RX ADMIN — MIDODRINE HYDROCHLORIDE 10 MG: 5 TABLET ORAL at 07:50

## 2021-01-03 ASSESSMENT — PAIN DESCRIPTION - PAIN TYPE
TYPE: ACUTE PAIN

## 2021-01-03 ASSESSMENT — PAIN DESCRIPTION - FREQUENCY
FREQUENCY: CONTINUOUS

## 2021-01-03 ASSESSMENT — PAIN SCALES - GENERAL
PAINLEVEL_OUTOF10: 7
PAINLEVEL_OUTOF10: 6
PAINLEVEL_OUTOF10: 9

## 2021-01-03 ASSESSMENT — PAIN DESCRIPTION - PROGRESSION
CLINICAL_PROGRESSION: NOT CHANGED
CLINICAL_PROGRESSION: NOT CHANGED

## 2021-01-03 ASSESSMENT — PAIN - FUNCTIONAL ASSESSMENT: PAIN_FUNCTIONAL_ASSESSMENT: ACTIVITIES ARE NOT PREVENTED

## 2021-01-03 ASSESSMENT — PAIN DESCRIPTION - LOCATION
LOCATION: ABDOMEN
LOCATION: ABDOMEN

## 2021-01-03 ASSESSMENT — PAIN DESCRIPTION - ONSET: ONSET: ON-GOING

## 2021-01-03 ASSESSMENT — PAIN DESCRIPTION - DESCRIPTORS: DESCRIPTORS: ACHING;CRAMPING

## 2021-01-03 NOTE — PROGRESS NOTES
Progress Note    HISTORY     CC:  Respiratory Failure          We are following for acute kidney injury       Subjective/   HPI:  K is better. Dialysis today. BP stable. Still making urine.   C/o abdominal pain      ROS:  Constitutional:  No fevers, No Chills, + weakness  Cardiovascular:  No palpations, + edema  Respiratory:  No wheezing, no cough  Skin:  No rash, no itching  :  No hematuria, no dysuria     Social Hx:  No family at bedside     Past Medical and Surgical History:  - Reviewed, no changes     EXAM       Objective/     Vitals:    01/03/21 0745 01/03/21 0953 01/03/21 1253 01/03/21 1645   BP: 120/82 120/86 101/74 114/77   Pulse: 113 116 119 118   Resp: 18 18 16 18   Temp: 98.3 °F (36.8 °C) 98.2 °F (36.8 °C) 97.8 °F (36.6 °C) 98.2 °F (36.8 °C)   TempSrc: Oral   Oral   SpO2: 98%   99%   Weight:  113 lb 8.6 oz (51.5 kg) 113 lb 8.6 oz (51.5 kg)    Height:         24HR INTAKE/OUTPUT:      Intake/Output Summary (Last 24 hours) at 1/3/2021 1759  Last data filed at 1/3/2021 1253  Gross per 24 hour   Intake 1380 ml   Output 950 ml   Net 430 ml     Constitutional:  Alert  Eyes:  Pupils reactive, sclera clear   Neck:  Normal thyroid, no masses   Cardiovascular:  Regular, no rub  Respiratory:  No distress, no wheezing  Psychiatry:  Flat mood/affect, alert  Abdomen: +bs, soft, nt, no masses   Musculoskeletal: + LE edema, no clubbing   Lymphatics:  No LAD in neck, no supraclavicular nodes   Access:  [facility]    MEDICAL DECISION MAKING       Data/  Recent Labs     01/03/21  0945   WBC 13.1*   HGB 7.3*   HCT 23.2*   MCV 92.4   *     Recent Labs     01/01/21  1201 01/02/21  1226 01/03/21  0945   * 130* 132*   K 2.3* 3.1* 3.6   CL 95* 97* 99   CO2 20* 19* 17*   GLUCOSE 143* 135* 147*   PHOS 2.1* 2.1* 2.7   MG 1.60* 1.30*  --    BUN 9 10 13   CREATININE 2.6* 3.6* 3.9*   LABGLOM 21* 15* 13*   GFRAA 26* 18* 16*       Assessment/     Acute Kidney Injury:  KDIGO stage 3  - ATN / septic shock in setting of cirrhosis  - Has been on dialysis with a right TDC  - Showing signs of recovery. No fluid removal with dialysis      Liver disease:  Chronic alcohol abuse, history of hep C  - prior acute alcoholic hepatitis      Respiratory Failure:  MRSA pneumonia and pulmonary edema. Resolved     Anemia of chronic disease:  Hb below target     Hypotension:  Part of cirrhotic physiology.   On midodrine     Plan/     Potassium replacement - monitor, may need to reduce dose soon  AMINATA for anemia  Follow labs   Monitor for recovery  HD today with no UF, then back to TTS unless we can start to hold   -----------------------------  Jeevan Darby M.D.   [facility]

## 2021-01-03 NOTE — FLOWSHEET NOTE
01/03/21 0953 01/03/21 1253   Vital Signs   Temp 98.2 °F (36.8 °C) 97.8 °F (36.6 °C)   Pulse 116 119   /86 101/74   Height and Weight   Weight 113 lb 8.6 oz (51.5 kg) 113 lb 8.6 oz (51.5 kg)   Stable and unevenful tx. No fluid removal per MD order. No meds given in HD.

## 2021-01-04 ENCOUNTER — APPOINTMENT (OUTPATIENT)
Dept: GENERAL RADIOLOGY | Age: 31
DRG: 280 | End: 2021-01-04
Attending: PHYSICAL MEDICINE & REHABILITATION
Payer: COMMERCIAL

## 2021-01-04 ENCOUNTER — APPOINTMENT (OUTPATIENT)
Dept: ULTRASOUND IMAGING | Age: 31
DRG: 280 | End: 2021-01-04
Attending: PHYSICAL MEDICINE & REHABILITATION
Payer: COMMERCIAL

## 2021-01-04 LAB
ALBUMIN FLUID: 0.3 G/DL
ALBUMIN SERPL-MCNC: 2.3 G/DL (ref 3.4–5)
AMMONIA: 85 UMOL/L (ref 11–51)
ANION GAP SERPL CALCULATED.3IONS-SCNC: 12 MMOL/L (ref 3–16)
APPEARANCE FLUID: CLEAR
BUN BLDV-MCNC: 6 MG/DL (ref 7–20)
CALCIUM SERPL-MCNC: 8.5 MG/DL (ref 8.3–10.6)
CELL COUNT FLUID TYPE: NORMAL
CHLORIDE BLD-SCNC: 98 MMOL/L (ref 99–110)
CLOT EVALUATION: NORMAL
CO2: 22 MMOL/L (ref 21–32)
COLOR FLUID: YELLOW
CREAT SERPL-MCNC: 2.4 MG/DL (ref 0.6–1.1)
FERRITIN: 411.3 NG/ML (ref 15–150)
FLUID TYPE: NORMAL
GFR AFRICAN AMERICAN: 28
GFR NON-AFRICAN AMERICAN: 23
GLUCOSE BLD-MCNC: 137 MG/DL (ref 70–99)
IGA: 1040 MG/DL (ref 70–400)
INR BLD: 1.5 (ref 0.86–1.14)
IRON SATURATION: NORMAL % (ref 15–50)
IRON: 139 UG/DL (ref 37–145)
LYMPHOCYTES, BODY FLUID: 16 %
MACROPHAGE FLUID: 79 %
MESOTHELIAL FLUID: 5 %
NUCLEATED CELLS FLUID: 99 /CUMM
NUMBER OF CELLS COUNTED FLUID: 100
PHOSPHORUS: 2.2 MG/DL (ref 2.5–4.9)
POTASSIUM SERPL-SCNC: 3.7 MMOL/L (ref 3.5–5.1)
PROTEIN FLUID: 0.5 G/DL
PROTHROMBIN TIME: 17.5 SEC (ref 10–13.2)
RBC FLUID: <1000 /CUMM
SODIUM BLD-SCNC: 132 MMOL/L (ref 136–145)
TOTAL IRON BINDING CAPACITY: NORMAL UG/DL (ref 260–445)
TSH SERPL DL<=0.05 MIU/L-ACNC: 3.24 UIU/ML (ref 0.27–4.2)

## 2021-01-04 PROCEDURE — 6370000000 HC RX 637 (ALT 250 FOR IP): Performed by: PHYSICAL MEDICINE & REHABILITATION

## 2021-01-04 PROCEDURE — 84443 ASSAY THYROID STIM HORMONE: CPT

## 2021-01-04 PROCEDURE — 99254 IP/OBS CNSLTJ NEW/EST MOD 60: CPT | Performed by: INTERNAL MEDICINE

## 2021-01-04 PROCEDURE — 6360000002 HC RX W HCPCS: Performed by: INTERNAL MEDICINE

## 2021-01-04 PROCEDURE — 97130 THER IVNTJ EA ADDL 15 MIN: CPT

## 2021-01-04 PROCEDURE — 82728 ASSAY OF FERRITIN: CPT

## 2021-01-04 PROCEDURE — 83516 IMMUNOASSAY NONANTIBODY: CPT

## 2021-01-04 PROCEDURE — 97530 THERAPEUTIC ACTIVITIES: CPT

## 2021-01-04 PROCEDURE — 82784 ASSAY IGA/IGD/IGG/IGM EACH: CPT

## 2021-01-04 PROCEDURE — 85610 PROTHROMBIN TIME: CPT

## 2021-01-04 PROCEDURE — 84157 ASSAY OF PROTEIN OTHER: CPT

## 2021-01-04 PROCEDURE — 6370000000 HC RX 637 (ALT 250 FOR IP): Performed by: INTERNAL MEDICINE

## 2021-01-04 PROCEDURE — 89051 BODY FLUID CELL COUNT: CPT

## 2021-01-04 PROCEDURE — 49083 ABD PARACENTESIS W/IMAGING: CPT

## 2021-01-04 PROCEDURE — 82042 OTHER SOURCE ALBUMIN QUAN EA: CPT

## 2021-01-04 PROCEDURE — 0W9G3ZZ DRAINAGE OF PERITONEAL CAVITY, PERCUTANEOUS APPROACH: ICD-10-PCS | Performed by: RADIOLOGY

## 2021-01-04 PROCEDURE — 92507 TX SP LANG VOICE COMM INDIV: CPT

## 2021-01-04 PROCEDURE — 82390 ASSAY OF CERULOPLASMIN: CPT

## 2021-01-04 PROCEDURE — 97129 THER IVNTJ 1ST 15 MIN: CPT

## 2021-01-04 PROCEDURE — 97110 THERAPEUTIC EXERCISES: CPT

## 2021-01-04 PROCEDURE — 86038 ANTINUCLEAR ANTIBODIES: CPT

## 2021-01-04 PROCEDURE — 97116 GAIT TRAINING THERAPY: CPT

## 2021-01-04 PROCEDURE — 36415 COLL VENOUS BLD VENIPUNCTURE: CPT

## 2021-01-04 PROCEDURE — 97535 SELF CARE MNGMENT TRAINING: CPT

## 2021-01-04 PROCEDURE — 83540 ASSAY OF IRON: CPT

## 2021-01-04 PROCEDURE — 82140 ASSAY OF AMMONIA: CPT

## 2021-01-04 PROCEDURE — 80069 RENAL FUNCTION PANEL: CPT

## 2021-01-04 PROCEDURE — 1280000000 HC REHAB R&B

## 2021-01-04 PROCEDURE — P9047 ALBUMIN (HUMAN), 25%, 50ML: HCPCS | Performed by: INTERNAL MEDICINE

## 2021-01-04 PROCEDURE — 74018 RADEX ABDOMEN 1 VIEW: CPT

## 2021-01-04 PROCEDURE — 83550 IRON BINDING TEST: CPT

## 2021-01-04 RX ORDER — OXYCODONE HYDROCHLORIDE 5 MG/1
10 TABLET ORAL EVERY 4 HOURS PRN
Status: DISCONTINUED | OUTPATIENT
Start: 2021-01-04 | End: 2021-01-09 | Stop reason: HOSPADM

## 2021-01-04 RX ORDER — LACTULOSE 10 G/15ML
20 SOLUTION ORAL 3 TIMES DAILY
Status: DISCONTINUED | OUTPATIENT
Start: 2021-01-04 | End: 2021-01-09 | Stop reason: HOSPADM

## 2021-01-04 RX ORDER — POTASSIUM CHLORIDE 20 MEQ/1
40 TABLET, EXTENDED RELEASE ORAL 2 TIMES DAILY
Status: DISCONTINUED | OUTPATIENT
Start: 2021-01-04 | End: 2021-01-05

## 2021-01-04 RX ORDER — OXYCODONE HYDROCHLORIDE 5 MG/1
5 TABLET ORAL EVERY 4 HOURS PRN
Status: DISCONTINUED | OUTPATIENT
Start: 2021-01-04 | End: 2021-01-09 | Stop reason: HOSPADM

## 2021-01-04 RX ORDER — ALBUMIN (HUMAN) 12.5 G/50ML
25 SOLUTION INTRAVENOUS ONCE
Status: COMPLETED | OUTPATIENT
Start: 2021-01-04 | End: 2021-01-04

## 2021-01-04 RX ADMIN — MIDODRINE HYDROCHLORIDE 10 MG: 5 TABLET ORAL at 11:33

## 2021-01-04 RX ADMIN — OXYCODONE 10 MG: 5 TABLET ORAL at 11:33

## 2021-01-04 RX ADMIN — LACTULOSE 20 G: 20 SOLUTION ORAL at 21:27

## 2021-01-04 RX ADMIN — TORSEMIDE 60 MG: 20 TABLET ORAL at 08:36

## 2021-01-04 RX ADMIN — POTASSIUM CHLORIDE 40 MEQ: 20 TABLET, EXTENDED RELEASE ORAL at 21:27

## 2021-01-04 RX ADMIN — THIAMINE HCL TAB 100 MG 100 MG: 100 TAB at 08:35

## 2021-01-04 RX ADMIN — MIDODRINE HYDROCHLORIDE 10 MG: 5 TABLET ORAL at 08:36

## 2021-01-04 RX ADMIN — Medication 1 TABLET: at 08:36

## 2021-01-04 RX ADMIN — Medication 1 CAPSULE: at 08:35

## 2021-01-04 RX ADMIN — CAPSAICIN: 0.25 CREAM TOPICAL at 08:40

## 2021-01-04 RX ADMIN — GABAPENTIN 100 MG: 100 CAPSULE ORAL at 08:36

## 2021-01-04 RX ADMIN — LACTULOSE 30 G: 20 SOLUTION ORAL at 08:38

## 2021-01-04 RX ADMIN — MIDODRINE HYDROCHLORIDE 10 MG: 5 TABLET ORAL at 17:23

## 2021-01-04 RX ADMIN — LACTULOSE 20 G: 20 SOLUTION ORAL at 15:15

## 2021-01-04 RX ADMIN — POTASSIUM CHLORIDE 40 MEQ: 20 TABLET, EXTENDED RELEASE ORAL at 08:36

## 2021-01-04 RX ADMIN — GABAPENTIN 100 MG: 100 CAPSULE ORAL at 15:15

## 2021-01-04 RX ADMIN — FERROUS SULFATE TAB 325 MG (65 MG ELEMENTAL FE) 324 MG: 325 (65 FE) TAB at 08:35

## 2021-01-04 RX ADMIN — ALBUMIN (HUMAN) 25 G: 0.25 INJECTION, SOLUTION INTRAVENOUS at 21:25

## 2021-01-04 RX ADMIN — GABAPENTIN 100 MG: 100 CAPSULE ORAL at 21:27

## 2021-01-04 RX ADMIN — FOLIC ACID 1 MG: 1 TABLET ORAL at 08:35

## 2021-01-04 ASSESSMENT — PAIN DESCRIPTION - ORIENTATION
ORIENTATION: LOWER
ORIENTATION: LOWER

## 2021-01-04 ASSESSMENT — PAIN SCALES - GENERAL
PAINLEVEL_OUTOF10: 9
PAINLEVEL_OUTOF10: 7
PAINLEVEL_OUTOF10: 7
PAINLEVEL_OUTOF10: 6
PAINLEVEL_OUTOF10: 7
PAINLEVEL_OUTOF10: 0
PAINLEVEL_OUTOF10: 5

## 2021-01-04 ASSESSMENT — PAIN DESCRIPTION - ONSET: ONSET: ON-GOING

## 2021-01-04 ASSESSMENT — PAIN DESCRIPTION - DESCRIPTORS
DESCRIPTORS: ACHING;PRESSURE
DESCRIPTORS: ACHING

## 2021-01-04 ASSESSMENT — PAIN DESCRIPTION - LOCATION
LOCATION: ABDOMEN
LOCATION: ABDOMEN;BACK
LOCATION: ABDOMEN

## 2021-01-04 ASSESSMENT — PAIN DESCRIPTION - PAIN TYPE
TYPE: ACUTE PAIN

## 2021-01-04 NOTE — CARE COORDINATION
Costa called and Amanda Españas to give her a referral for the Pt for H?D in community. Beaumont Alba will follow up with COSTA and gather needed information.    RD Fowler

## 2021-01-04 NOTE — PROGRESS NOTES
Verna Leonard  1/4/2021  [id number]    Chief Complaint: Hepatic encephalopathy ([address]    Subjective:   Ammonia now downtrending, but with increasing abdominal distention     ROS: No n/v, cp, sob, f/c  Objective:  Patient Vitals for the past 24 hrs:   BP Temp Temp src Pulse Resp SpO2 Weight   01/04/21 0830 109/75 98.3 °F (36.8 °C) Oral 118 18 100 % --   01/04/21 0321 -- -- -- -- -- -- 118 lb 12.8 oz (53.9 kg)   01/03/21 2115 113/76 98.9 °F (37.2 °C) Oral 114 16 100 % --   01/03/21 1645 114/77 98.2 °F (36.8 °C) Oral 118 18 99 % --   01/03/21 1253 101/74 97.8 °F (36.6 °C) -- 119 16 -- 113 lb 8.6 oz (51.5 kg)     Gen: No distress, pleasant. HEENT: Normocephalic, atraumatic. CV: Regular rate and rhythm. Resp: No respiratory distress. Abd: Increased abd distention   Ext: No edema. Neuro: Alert, oriented, appropriately interactive.    Wt Readings from Last 3 Encounters:   01/04/21 118 lb 12.8 oz (53.9 kg)   06/17/20 124 lb (56.2 kg)   06/28/16 124 lb (56.2 kg)       Laboratory data:   Lab Results   Component Value Date    WBC 13.1 (H) 01/03/2021    HGB 7.3 (L) 01/03/2021    HCT 23.2 (L) 01/03/2021    MCV 92.4 01/03/2021     (L) 01/03/2021       Lab Results   Component Value Date     01/03/2021    K 3.6 01/03/2021    K 3.6 06/19/2020    CL 99 01/03/2021    CO2 17 01/03/2021    BUN 13 01/03/2021    CREATININE 3.9 01/03/2021    GLUCOSE 147 01/03/2021    CALCIUM 8.6 01/03/2021        Therapy progress:  PT  Position Activity Restriction  Other position/activity restrictions: L foot drop  Objective     Sit to Stand: Stand by assistance  Stand to sit: Stand by assistance  Bed to Chair: Contact guard assistance  Device: Rolling Walker  Assistance: Contact guard assistance, Stand by assistance  Distance: 150' + 200' + 15' x 2  OT  PT Equipment Recommendations  Other: CTA  Toilet - Technique: Ambulating  Equipment Used: Standard toilet  Toilet Transfers Comments: SBA from toilet  Assessment        SLP  Current Diet : Regular  Current Liquid Diet : Thin  Diet Solids Recommendation: Regular  Liquid Consistency Recommendation: Thin    Body mass index is 19.17 kg/m². Rehabilitation Diagnosis:  Neurologic, 3.8, Neuromuscular Disorders, e.g. Critical Illness Myopathy, Other Myopathy     Assessment and Plan:  Alcoholic hepatitis with hepatic encephalopathy. Rifaximin discontinued. Cont lactulose, drop to 30ml. Consult GI given worsening distention.      Dysphagia. SLP     MRSA pneumonia with resp failure. Abx completed. Pulm toilet, volume expansion.        Renal failure, hepatorenal vs ATN, now on HD. Nephrology consulted.      Electrolyte abnormalities. Nephrology consulted for HD.      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 Gabapentin; increased dose with improvement in symptoms.      Bladder: Check PVR x 3. [address] if PVR > 200ml or if any volume is > 500 ml.      Sleep: Trazodone provided prn.      Follow up appointments: GI/hepatology, PCP    Darrin Contreras MD 1/4/2021, 10:37 AM

## 2021-01-04 NOTE — PROGRESS NOTES
Physical Therapy  [facility]  Daily Treatment Note  NAME: Sarah Mustafa  : 1990  MRN: [id number]    Date of Service: 2021    Discharge Recommendations:  24 hour supervision or assist, Outpatient PT   PT Equipment Recommendations  Equipment Needed: Yes  Mobility Devices: Lark Analy: Rolling  Other: Pt demonstrates improved safety and stability with gait activities with RW for home and community distances, RW with improve safety, decrease risk of falls and decrease burden of care. Assessment   Body structures, Functions, Activity limitations: Decreased functional mobility ; Decreased sensation; Increased pain;Decreased balance;Decreased strength;Decreased safe awareness;Decreased endurance  Assessment: Pt pleasant and agreeable to PT tx and demonstrates good progress with functional mobility and gait. Pt completes bed mobility with S, t/f with and without AD with SBA and ambulates up to 200' with RW with SBA. Pt continues to demonstrate decreased BLE strength and requires CGA to [place] with RW to complete step ups to 6\" step. Pt completes DGI with score 11/24 demonstrating a high fall risk. Pt has difficulty with change in gait speed, completing turns and completing stairs. Pt is also limited by reports of increased abdominal and LBP during session with RN and MD aware. Pt will benefit from continued skilled PT in ARU to address above deficits, will continue to progress mobility as tolerated. Treatment Diagnosis: impaired endurance and functional mobility. Prognosis: Good  Decision Making: Medium Complexity  PT Education: Goals;PT Role;Plan of Care;Transfer Training;Equipment;Gait Training;General Safety; Functional Mobility Training  Patient Education: Importance of OOB mobility and gait, pt demonstrates understanding  Barriers to Learning: none  REQUIRES PT FOLLOW UP: Yes  Activity Tolerance  Activity Tolerance: Patient Tolerated treatment well;Patient limited by pain; Patient limited by endurance  Activity Tolerance: Pt reports increased LBP and abdominal pain this date     Patient Diagnosis(es): There were no encounter diagnoses. has a past medical history of Acute respiratory failure ([facility] [address] ETOH abuse, Hemodialysis patient ([facility] [address] Hep C w/o coma, chronic [facility] History of blood transfusion, Opiate dependence, continuous ([facility] and Thyroid disease. has no past surgical history on file. Restrictions  Restrictions/Precautions  Restrictions/Precautions: General Precautions, Fall Risk  Required Braces or Orthoses?: No  Position Activity Restriction  Other position/activity restrictions: L foot drop  Subjective   General  Chart Reviewed: Yes  Additional Pertinent Hx: PMH liver hepatitis, pancreatitis, liver cirrhosis, ascites, portal hypertension, neuropathy, due to increased alcohol abuse  Response To Previous Treatment: Patient reporting fatigue but able to participate. Family / Caregiver Present: No  Referring Practitioner: Dr Francisco Bolivar  Subjective  Subjective: Pt supine in bed on approach, reports LBP this date  Pain Screening  Patient Currently in Pain: Yes  Pain Assessment  Pain Assessment: 0-10  Pain Level: 7  Pain Type: Acute pain  Pain Location: Abdomen;Back  Pain Orientation: Lower  Pain Descriptors: Aching;Pressure  Non-Pharmaceutical Pain Intervention(s): Ambulation/Increased Activity;Repositioned; Therapeutic presence;Distraction  Vital Signs  Patient Currently in Pain: Yes       Orientation  Orientation  Overall Orientation Status: Within Normal Limits    Objective   Bed mobility  Bridging: Contact guard assistance(to brace BLE)  Supine to Sit: Supervision(HOB elevated, use of BR)  Sit to Supine: Supervision     Transfers  Sit to Stand: Stand by assistance  Stand to sit: Stand by assistance  Comment: EOB to RW SBA, standard chair to RW no AD completed x 5 reps, intermittent cues for hand placement and sequence when using RW     Ambulation  Ambulation?: Yes  More Ambulation?: Yes  Ambulation 1  Surface: level tile  Device: Rolling Walker  Other Apparatus: (ace wrap DF assist LLE)  Assistance: Stand by assistance  Quality of Gait: Step through gait, B decreased step height, LLE foot drop, LLE mild steppage gait, wide ROLO. Pt mildly unsteady no overt LOB. Gait Deviations: Slow Rita;Decreased step length;Decreased step height; Increased ROLO  Distance: 100' x 2 + 200'  Ambulation 2  Surface - 2: level tile  Device 2: No device  Other Apparatus 2: (ace wrap DF assist LLE)  Assistance 2: Contact guard assistance;Minimal assistance  Quality of Gait 2: Step through gait, wide ROLO, B decreased toe clearance with L foot drop (improved with L DF assist wrap), slight steppage gait LLE, decreased arm swing, increased postural sway. Pt mildly unsteady. Gait Deviations: Slow Rita;Decreased step length;Decreased step height; Increased ROLO; Decreased arm swing  Distance: 20' x 3  Comments: Completed with four cone task pt ambulating x 5' forwards, sides stepping to L, retrowalking, sides stepping to R. seated rest break between bouts to recover from fatigue. Ambulation 3  Surface - 3: level tile  Device 3: Rolling Walker  Assistance 3: Stand by assistance;Contact guard assistance  Quality of Gait 3: Pt completes with performance of DGI including multiple L/R, up/down head turns, change in velocity, figure 8 around obstacles, stepping over obstacles, stopping on command and completing turn. Pt steady with no overt LOB. Gait Deviations: Slow Rita;Decreased step length;Decreased step height; Increased ROLO  Distance: 200' x 50' x 2  Stairs/Curb  Stairs?: Yes  Stairs  Curbs: 6\"  Device: Rolling walker  Assistance: Minimal assistance;Contact guard assistance  Comment: Pt completes x 5 reps step up/dwon 6\" curb step with RW, Increased time to complete, limited by decreased strength and decreased eccentric control.      Balance  Posture: Fair  Sitting - Static: Good  Sitting - Dynamic: Good  Standing - Static: Fair  Standing - Dynamic: Fair;-  Comments: Standing toe taps to 6\" step x 10 with RW CGA for balance     Exercises  Bridging: (x 10 reps hooklying bridges with CGA to block B knees)  Heelslides: Supine BLE knee to chest  x10  Core Strengthening: Hooklying BLE trunk rotation x 10  Comments: Cues for sequence/technique, completed d/t pt reports of LBP this date                     OutComes Score  Dynamic Gait Total Score: 11 (01/04/21 1508)                                                Addendum: 2nd session   Pt seen in pm for second session, pt pleasant and agreeable although tearful at start of session. Pt reports seeing GI prior to OT session and pt reports she anxious for EGD/colonoscopy. Pt provided with active listening and support. Pt reports continued abdominal pain but improved from earlier, pt does not provide a pain score. Bed mobility:   Supine to/from sit MI  Transfers:   Sit to/from stand EOB without AD SBA   Sit to/from stand SCIFIT without AD SBA  Gait:  Ambulation 1  Surface: level tile  Device: no AD  Other Apparatus: (ace wrap DF assist LLE)  Assistance: CGA/[place]  Quality of Gait: Step through gait, B decreased step height, LLE foot drop, LLE mild steppage gait, wide ROLO. Pt unsteady with one lateral LOB with [place] to correct. Gait Deviations: Slow Rita;Decreased step length;Decreased step height; Increased ROLO  Distance: 100' x 2  Pt requires seated rest break between bouts d/t fatigue. Therapeutic exercise:   Pt completes x 8 minutes on nustep level 1 with BUE/BLE maintaining average SPM >60. Completed to improve gait through reciprocal UE and LE movements. Pt supine in bed at end of session with all needs within reach and bed alarm in place. Goals  Short term goals  Time Frame for Short term goals: Goals to be met by 01/04/21  Short term goal 1: Pt to perform bed mobility.  independently -- GOAL NOT MET but progressing: S  Short term goal 2: Pt to perform sit to/from stand to LRAD SBA -- GOAL MET SBA with and without AD  Short term goal 3: Pt to perform gait X 100 ft with LRAD CGA -- GOAL MET up to 200' with RW SBA, 100' without AD CGA  Short term goal 4: Pt to go up and down 8 steps with 1 HR CGA -- GOAL NOT MET but progressing x 5 reps step ups to 6\" step with RW with CGA/[place]  Long term goals  Time Frame for Long term goals : Goals to be met by 01/07/21  Long term goal 1: Pt to perform sit to/from stand with LRAD: Supervision  Long term goal 2: Pt to perform gait X 150 ft with LRAD supervision  Long term goal 3: Pt to perform 2 curb steps with LRAD supervision  Long term goal 4: Pt to perform 8 steps with 1 HR and LRAD if needed Supervision  Patient Goals   Patient goals : \"to get better, to get stronger\"    Plan    Plan  Times per week: 5 out of 7 days  Times per day: Daily  Plan weeks: 10 days  Current Treatment Recommendations: Strengthening, Neuromuscular Re-education, Home Exercise Program, Safety Education & Training, Balance Training, Endurance Training, Patient/Caregiver Education & Training, Functional Mobility Training, Equipment Evaluation, Education, & procurement, Transfer Training, Gait Training, Stair training, Pain Management, Positioning  Safety Devices  Type of devices:  All fall risk precautions in place, Gait belt, Left in bed, Nurse notified, Call light within reach, Patient at risk for falls, Bed alarm in place  Restraints  Initially in place: No     Therapy Time   Individual Concurrent Group Co-treatment   Time In 1030         Time Out 1130         Minutes 60         Timed Code Treatment Minutes: [facility] Therapy Time:   Individual Concurrent Group Co-treatment   Time In 1300         Time Out 1330         Minutes 30           Timed Code Treatment Minutes:  30 minutes    Total Treatment Minutes:  90 minutes    Qi Arevalo, PT, DPT

## 2021-01-04 NOTE — PROGRESS NOTES
Progress Note    HISTORY     CC:  Respiratory Failure          We are following for acute kidney injury       Subjective/   HPI:    Still making urine.   C/o abdominal pain    HD on 1/3 w no fluid removal , post wt 51.5 kg    ROS:  Constitutional:  No fevers, No Chills, + weakness  Cardiovascular:  No palpations, + edema  Respiratory:  No wheezing, no cough  Skin:  No rash, no itching  :  No hematuria, no dysuria     Social Hx:  No family at bedside     Scheduled Meds:   potassium chloride  40 mEq Oral BID    lactulose  20 g Oral TID    gabapentin  100 mg Oral TID    torsemide  60 mg Oral Daily    [START ON 1/7/2021] darbepoetin ruba-polysorbate  40 mcg Intravenous Weekly - Thursday    ferrous sulfate  324 mg Oral Daily with breakfast    midodrine  10 mg Oral TID WC    therapeutic multivitamin-minerals  1 tablet Oral Daily    folic acid  1 mg Oral Daily    lactobacillus  1 capsule Oral Daily with breakfast    capsaicin   Topical BID    thiamine mononitrate  100 mg Oral Daily    vitamin D  50,000 Units Oral Weekly     Continuous Infusions:  PRN Meds:.oxyCODONE **OR** oxyCODONE, heparin (porcine), heparin (porcine), sennosides-docusate sodium, acetaminophen, magnesium hydroxide, hydrOXYzine, ondansetron, bisacodyl      EXAM       Objective/     Vitals:    01/03/21 1645 01/03/21 2115 01/04/21 0321 01/04/21 0830   BP: 114/77 113/76  109/75   Pulse: 118 114  118   Resp: 18 16  18   Temp: 98.2 °F (36.8 °C) 98.9 °F (37.2 °C)  98.3 °F (36.8 °C)   TempSrc: Oral Oral  Oral   SpO2: 99% 100%  100%   Weight:   118 lb 12.8 oz (53.9 kg)    Height:         24HR INTAKE/OUTPUT:      Intake/Output Summary (Last 24 hours) at 1/4/2021 1045  Last data filed at 1/4/2021 1025  Gross per 24 hour   Intake 1840 ml   Output 950 ml   Net 890 ml     Constitutional:  Alert  Eyes:  Pupils reactive, sclera clear   Neck:  Normal thyroid, no masses   Cardiovascular:  Regular, no rub  Respiratory:  No distress, no wheezing  Psychiatry:  Flat mood/affect, alert  Abdomen: +bs, soft, nt, no masses   Musculoskeletal: + LE edema, no clubbing   Lymphatics:  No LAD in neck, no supraclavicular nodes   Access: [facility]    MEDICAL DECISION MAKING       Data/  Recent Labs     01/03/21  0945   WBC 13.1*   HGB 7.3*   HCT 23.2*   MCV 92.4   *     Recent Labs     01/01/21  1201 01/02/21  1226 01/03/21  0945   * 130* 132*   K 2.3* 3.1* 3.6   CL 95* 97* 99   CO2 20* 19* 17*   GLUCOSE 143* 135* 147*   PHOS 2.1* 2.1* 2.7   MG 1.60* 1.30*  --    BUN 9 10 13   CREATININE 2.6* 3.6* 3.9*   LABGLOM 21* 15* 13*   GFRAA 26* 18* 16*       Assessment/     Acute Kidney Injury:  KDIGO stage 3  - ATN / septic shock in setting of cirrhosis  - Has been on dialysis with a right TDC  - Showing signs of recovery. No fluid removal with dialysis      Liver disease:  Chronic alcohol abuse, history of hep C  - prior acute alcoholic hepatitis      Respiratory Failure:  MRSA pneumonia and pulmonary edema. Resolved     Anemia of chronic disease:  Hb below target     Hypotension:  Part of cirrhotic physiology.   On midodrine     Plan/   -HD on TTS schedule-Monitor for recovery  -Potassium replacement - monitor, decreased to BID from TID on 1/4  -AMINATA for anemia  -Follow labs

## 2021-01-04 NOTE — PROGRESS NOTES
Speech Language Pathology  [facility]: ACUTE REHAB UNIT  SPEECH-LANGUAGE PATHOLOGY      [x] Daily  [] Weekly Care Conference Note  [] Discharge    Patient:Verna Lincoln      :1990  LYO:[id number]  Rehab Dx/Hx: Hepatic encephalopathy ([facility] [address] [K72.90]    Precautions: falls  Home situation: Lives at home with fiance and [de-identified] year old, does not work.   ST Dx: [] Aphasia  [] Dysarthria  [] Apraxia   [] Oropharyngeal dysphagia [x] Cognitive Impairment  [x] Other: Voice  Date of Admit: 2020  Room #: 5820/6475-21    Current functional status (updated daily):         Pt being seen for : [x] Speech/Language Treatment  [] Dysphagia Treatment [x] Cognitive Treatment  [x] Other: voice tx  Communication: [x]WFL  [] Aphasia  [] Dysarthria  [] Apraxia  [] Pragmatic Impairment [] Non-verbal  [] Hearing Loss  [] Other:   Cognition: [] WFL  [] Mild  [x] Moderate  [x] Severe [] Unable to Assess  [] Other:  Memory: [] WFL  [] Mild  [x] Moderate  [x] Severe [] Unable to Assess  [] Other:  Behavior: [x] Alert  [x] Cooperative  [x]  Pleasant  [] Confused  [] Agitated  [] Uncooperative  [] Distractible [] Motivated  [] Self-Limiting [] Anxious  [] Other:  Endurance:  [x] Adequate for participation in SLP sessions  [] Reduced overall  [] Lethargic  [] Other:  Safety: [] No concerns at this time  [x] Reduced insight into deficits  [x]  Reduced safety awareness [] Not following call light procedures  [] Unable to Assess  [] Other:    Current Diet Order:DIET GENERAL;  Dietary Nutrition Supplements: Frozen Oral Supplement  Swallowing Precautions: HOB 90* and 30\" after meals; small bites/sips; alternate solids/liquids every 3-5 bites; oral care after every meal        Date: 2021      Tx session 1  [id number] Tx session 2  [id number]   Total Timed Code Min 30 30   Total Treatment Minutes 30 30   Individual Treatment Minutes 30 30   Group Treatment Minutes 0 0   Co-Treat Minutes 0 0   Variance/Reason:  0 0   Pain 7- stomach 7/8 in 6 below. Goal 6: Pt will increase maximum phonation time to > 8 seconds. Did not target. Discussed importance of diaphragmatic breathing exercises. MPT completed x5:  -First set: average of 4.18 seconds  -Second set average of 3.92 seconds  -pt required cueing for increased vocal volume during completion of exercise      Other areas targeted: NA   N/a    Education:   edu provided re: reasoning and thought organization strategies   Edu provided re: compensatory memory strategies, diaphragmatic breathing exercises    Safety Devices: [x] Call light within reach  [] Chair alarm activated  [x] Bed alarm activated  [] Other:  [x] Call light within reach  [] Chair alarm activated  [x] Bed alarm activated  [] Other:    Assessment: Session 1: Pt pleasant and cooperative, agreeable to participate. Pt requesting to lay down due to significant pain in abdomen, RN aware and notified. Pt completed reasoning and thought organization task with 83% accuracy independently, improving to 100% accuracy given min cues. Pt benefited from cues to slow down, organize her thoughts and use the look back method to double check her answer/response. Session 2: Pt pleasant and cooperative, agreeable to participate. Pt completed high level functional and short term recall task with 100% accuracy given min cues. SLP reinforced use of compensatory memory strategies, and pt demonstrated good carryover of strategies. Pt completed sustained \"ah\" exercises today, two sets of 5 each. Pt required cues for increased vocal volume during completion of exercises and for use of diaphragmatic breathing. Pt is motivated to improve. Continue goals as listed above. Plan: Continue as per plan of care.       Additional Information:     Barriers toward progress: Cognitive deficit, reduced insight into deficits  Discharge recommendations:  [] Home independently  [] Home with assistance []  24 hour supervision  [] ECF [x] Other: See PT/OT  Continued Tx Upon Discharge: ? [] Yes [] No [x] TBD based on progress while on ARU [] Vital Stim indicated [] Other:   Estimated discharge date: 01/07/21    Interventions used this date:  [x] Speech/Language Treatment  [] Instruction in HEP [] Group [] Dysphagia Treatment [x] Cognitive Treatment   [x] Other: voice      Total Time Breakdown / Charges    Time in Time out Total Time / units   Cognitive Tx 0800  0930 0830  0950 30 min/ 2 units  10 min/ 1 unit    Speech Tx 0950 1000 10 min/ 1 unit    Dysphagia Tx -- -- --       Electronically Signed by  Sara Dugan. A [facility]-SLP  Speech-Language Pathologist  [phone]

## 2021-01-04 NOTE — CONSULTS
[address] ,  [address] [facility]  Phone: [phone] [facility],  [address] [place] [address], [address] [place] [address]  Phone: [phone]   AOL:[phone]    Gastroenterology H&P/Consult Note    HPI (location/symptom, timing/onset, duration, quality/severity, context, modifying factors, and associated signs/symptoms)     Thank you Ethan Schroeder MD for asking me to see Cher Campa in consultation. She is a 26y.o. year old female seen indendently who presents with the following GI complaints: Hepatic encephalopathy ([facility] [address] [K72.90]. The documented principal problem and chief complaint are Hepatic encephalopathy ([facility] [address] No chief complaint on file. Date of Admission:  12/29/2020  Date of Consultation:  1/4/2021    Asked to see patient for abdominal distension. She has been given a presumptive diagnosis of cirrhosis based on thrombocytopenia, hypoalbuminemia, jaundice, elevated ammonia/encephalopathy. She has previously seen  GI for chcv and told she was not a candidate for treatment because she only had F1 fibrosis, only 2 years ago. She started drinking a pint of vodka daily after that which she did through September. States no alcohol since that time. She states she has been hospitalized since October. Indicates she has lost 15lbs since hospitalized. Was on xifaxan and lactulose now just lactulose. Has a worsening anemia from 11.6 to 7.3 in 6 months. She has not menstruated in 6 months. CT 2 months ago with extensive fatty liver. Last Encounter Reviewed:   Pertinent PMH, FH, SH is reviewed below.   Last EGD: none  Last Colonoscopy: none    Health Maintenance   Topic Date Due    Hepatitis C screen  1990    Varicella vaccine (1 of 2 - 2-dose childhood series) 10/05/1989    Pneumococcal 0-64 years Vaccine (1 of 1 - PPSV23) 10/05/1994    HIV screen  10/05/2003    DTaP/Tdap/Td vaccine (1 - Tdap) 10/05/2007    Cervical cancer screen 10/05/2009    Flu vaccine (1) 09/01/2020    Potassium monitoring  01/04/2022    Creatinine monitoring  01/04/2022    Hepatitis A vaccine  Aged Out    Hepatitis B vaccine  Aged Out    Hib vaccine  Aged Out    Meningococcal (ACWY) vaccine  Aged Out     PAST MEDICAL HISTORY     Past Medical History:   Diagnosis Date    Acute respiratory failure ([facility] 12/12/2020    ETOH abuse     Hemodialysis patient ([facility]     Hep C w/o coma, chronic ([facility] 2012    History of blood transfusion     Opiate dependence, continuous ([facility] 2015    Thyroid disease      FAMILY HISTORY   No family history on file. SOCIAL HISTORY     Social History     Tobacco Use    Smoking status: Current Every Day Smoker     Packs/day: 0.50     Years: 15.00     Pack years: 7.50    Smokeless tobacco: Never Used   Substance Use Topics    Alcohol use: Yes    Drug use: No     Types: IV     Comment: Hx. of Heroin, denies at this time, takes Methadone. SURGICAL HISTORY   No past surgical history on file.   ALLERGIES     Allergies   Allergen Reactions    Ceclor [Cefaclor]      Rash when she was a baby      CURRENT MEDICATIONS      potassium chloride  40 mEq Oral BID    lactulose  20 g Oral TID    gabapentin  100 mg Oral TID    torsemide  60 mg Oral Daily    [START ON 1/7/2021] darbepoetin ruba-polysorbate  40 mcg Intravenous Weekly - Thursday    ferrous sulfate  324 mg Oral Daily with breakfast    midodrine  10 mg Oral TID WC    therapeutic multivitamin-minerals  1 tablet Oral Daily    folic acid  1 mg Oral Daily    lactobacillus  1 capsule Oral Daily with breakfast    capsaicin   Topical BID    thiamine mononitrate  100 mg Oral Daily    vitamin D  50,000 Units Oral Weekly       oxyCODONE **OR** oxyCODONE, heparin (porcine), heparin (porcine), sennosides-docusate sodium, acetaminophen, magnesium hydroxide, hydrOXYzine, ondansetron, bisacodyl  HOME MEDICATIONS  [unfilled]  IMMUNIZATIONS     There is no immunization history on file for this patient. REVIEW OF SYSTEMS (2-9 systems for level 4, 10 or more for level 5)   See HPI for further details and pertinent postiives. Negative for the following:  Constitutional: Negative for weight change. Negative for appetite change and fatigue. HENT: Negative for nosebleeds, sore throat, mouth sores, trouble swallowing and voice change. Respiratory: Negative for cough, choking and chest tightness. Cardiovascular: Negative for chest pain   Gastrointestinal: No abdominal pain, heartburn, bloating, dysphagia, cough, chest pain, globus, regurgitation, diarrhea, constipation, nausea, or vomiting. Positive for jaundice, abdominal distension. Musculoskeletal: Negative for arthralgias. Skin: Negative for pallor. Neurological: Negative for weakness and light-headedness. Hematological: Negative for adenopathy. Does not bruise/bleed easily. Psychiatric/Behavioral: Negative for suicidal ideas. PHYSICAL EXAM (7 for level 4, 8 or more for level 5)   VITAL SIGNS: /87   Pulse 102   Temp 98.3 °F (36.8 °C) (Oral)   Resp 18   Ht 5' 6\" (1.676 m)   Wt 118 lb 12.8 oz (53.9 kg)   SpO2 100%   BMI 19.17 kg/m²   With regards to weight, she reports lost 15 lbs over 2. Review of available records reveals: Wt Readings from Last 50 Encounters:   01/04/21 118 lb 12.8 oz (53.9 kg)   06/17/20 124 lb (56.2 kg)   06/28/16 124 lb (56.2 kg)     Constitutional: Well developed, Well nourished, No acute distress, Non-toxic appearance. HENT: Normocephalic, Atraumatic, Bilateral external ears normal, Oropharynx moist, No oral exudates, Nose normal. icteric  Eyes: Conjunctiva normal, No discharge. Neck: Normal range of motion, No tenderness, Supple, No stridor. Lymphatic: No lymphadenopathy noted. Cardiovascular: Normal heart rate, Normal rhythm, No murmurs, No rubs, No gallops. Thorax & Lungs: Normal breath sounds, No respiratory distress, No wheezing, No chest tenderness.    Abdomen: scars consistent with stated surgeries,  Soft NT, distended, tympanic. Rectal:  Deferred. Skin: Warm, Dry, No erythema, No rash, jaundice  Back: No tenderness, No CVA tenderness. Extremities: Intact distal pulses, No edema, No tenderness, No cyanosis, No clubbing. Neurologic: Alert & oriented x 3, Normal motor function, Normal sensory function, No focal deficits noted. RADIOLOGY/PROCEDURES     Results for orders placed during the hospital encounter of 07/19/17   XR Chest Standard TWO VW    Narrative AP ABDOMEN:    REASON FOR EXAM: Acute constipation    FINDINGS:    2 AP supine views of the abdomen demonstrate a nonobstructed bowel  gas pattern. No excessive stool burden. No organomegaly or mass  effect. No suspicious calcifications. Mild scoliosis of the lumbar  spine no acute bony abnormality. Impression Negative for obstruction or excessive stool burden. PA AND LATERAL CHEST X-RAY:    REASON FOR EXAM: Acute upper respiratory infection    COMPARISON: None    FINDINGS: PA and lateral views of the chest demonstrate a normal  cardiomediastinal silhouette. Right middle lobe  atelectasis/consolidation. Left lung clear. Normal pulmonary  vascularity. No pleural effusion or acute bony abnormality. IMPRESSION:    Right middle lobe atelectasis/pneumonia. Results for orders placed during the hospital encounter of 06/17/20   CT ABDOMEN PELVIS W IV CONTRAST Additional Contrast? None    Narrative CT ABDOMEN AND PELVIS WITH CONTRAST    HISTORY: Epigastric pain    FINDINGS: Thin section axial images obtained through the abdomen pelvis following the administration of 80 mL Isovue-370. Multiplanar reconstruction images received for interpretation. Low radiation dose CT technique utilized. Heart size appears within normal limits. Lung bases are clear. Within the abdomen, liver normal in size demonstrating extensive diffuse hepatic steatosis. No space-occupying lesion identified.   The spleen, adrenal glands, and Value Date    AMMONIA 85 (H) 01/04/2021    AMMONIA 67 (H) 01/03/2021    AMMONIA 99 (H) 01/02/2021    AMMONIA 139 (HH) 01/01/2021    AMMONIA 121 (HH) 12/31/2020     FINAL IMPRESSION/ASSESSMENT/PLAN       1. Jaundice, elevated ammonia, hypoalbuminemia, thrombocytopenia - check inr and cirrhosis labs. Paracentesis if fluid. Also check KUB in addition to RUQ US. May need eventual liver biopsy pending above labs. 2.blood loss anemia - needs egd and colonoscopy, possibly Wednesday. 1.  The patient indicates understanding of these issues and agrees with the plan. 2.  I reviewed the patient's medical information and medical history. 3.  I have reviewed the past medical, family, and social history sections including the medications and allergies listed in the above medical record. Thank you for involving [facility]) Gastroenterology in the hospital care of [facility]. For further questions or concerns, we can best be reached through perfect serv.         Cornelius Guerrero 1/4/21 12:14 PM EST    [facility] ([facility] Physicians Gastroenterology   Phone [phone]   Fax [phone]

## 2021-01-04 NOTE — FLOWSHEET NOTE
requested for emotional distress; patient tearful and shared that she misses her son. Supportive family, parents help with patient's son daily, nori also very supportive. Patient stated that she felt like she was 'the black sheep; I know I am.'      assisted patient in exploring sense of guilt for needing help, feeling unworthy of love from her family, grief and pain of not seeing her son for so long. Patient is frustrated 'I can't get any answers or results. I feel like they think I'm lying, but I'm not.'     Spiritual Care to follow, provide listening support and prayer. Patient mentioned nori attends [employer] regularly.  Keenan Mccoy       01/04/21 9712   Encounter Summary   Services provided to: Patient   Referral/Consult From: Nurse;Patient   Support System Children;Parent;Significant other   Continue Visiting Yes  (1/4: patient req. visit, distress, frustration, see note)   Complexity of Encounter High   Length of Encounter 1 hour   Spiritual Assessment Completed Yes   Crisis   Type Emotional distress   Assessment Approachable;Tearful;Grieving; Hopeless; Angry; Doubtful   Intervention Nurtured hope; Active listening;Explored coping resources; Explored feelings, thoughts, concerns;Prayer;Discussed meaning/purpose   Outcome Expressed gratitude;Comfort;Coping;Tearful;Grieving;Venting emotion

## 2021-01-04 NOTE — PROGRESS NOTES
Spoke with Specials/Radiology today regarding pt Guided Paracentesis and RUQ Gallbladder Imaging, who stated that pt will not be able to get these completed today, and that they will be performed tomorrow afternoon instead d/t no time slots available. New orders for pt to be NPO at midnight tonight for upcoming RUQ imaging scheduled for tomorrow. Pt alert and oriented X 4 and able to make needs known. GI came in to speak with pt this afternoon and explained procedures/tests to pt. Pt verbalized understanding and agreeable. Pt stated to nursing staff \"I know that I need it done, but I'm just really worried. I'm so scared. \" Pt started crying. Nursing encouraged pt to express feelings. Pt expressed spiritual needs to nursing staff this afternoon and asked to speak to . Call placed to [facility] with spiritual care, who stated that he would come down and speak with pt regarding spiritual/emotional distress within the hour. Nursing will monitor for changes.

## 2021-01-05 ENCOUNTER — APPOINTMENT (OUTPATIENT)
Dept: ULTRASOUND IMAGING | Age: 31
DRG: 280 | End: 2021-01-05
Attending: PHYSICAL MEDICINE & REHABILITATION
Payer: COMMERCIAL

## 2021-01-05 LAB
ABO/RH: NORMAL
ABO/RH: NORMAL
ALBUMIN SERPL-MCNC: 2.6 G/DL (ref 3.4–5)
AMMONIA: 95 UMOL/L (ref 11–51)
ANION GAP SERPL CALCULATED.3IONS-SCNC: 13 MMOL/L (ref 3–16)
ANTI-NUCLEAR ANTIBODY (ANA): NEGATIVE
ANTIBODY SCREEN: NORMAL
BUN BLDV-MCNC: 10 MG/DL (ref 7–20)
CALCIUM SERPL-MCNC: 8.3 MG/DL (ref 8.3–10.6)
CHLORIDE BLD-SCNC: 96 MMOL/L (ref 99–110)
CO2: 20 MMOL/L (ref 21–32)
CREAT SERPL-MCNC: 2.8 MG/DL (ref 0.6–1.1)
GFR AFRICAN AMERICAN: 24
GFR NON-AFRICAN AMERICAN: 20
GLUCOSE BLD-MCNC: 103 MG/DL (ref 70–99)
HBV SURFACE AB TITR SER: >1000 MIU/ML
HCT VFR BLD CALC: 21.3 % (ref 36–48)
HEMOGLOBIN: 6.7 G/DL (ref 12–16)
HEPATITIS B SURFACE ANTIGEN INTERPRETATION: NORMAL
MCH RBC QN AUTO: 29 PG (ref 26–34)
MCHC RBC AUTO-ENTMCNC: 31.5 G/DL (ref 31–36)
MCV RBC AUTO: 92 FL (ref 80–100)
PDW BLD-RTO: 18.1 % (ref 12.4–15.4)
PHOSPHORUS: 2.9 MG/DL (ref 2.5–4.9)
PLATELET # BLD: 81 K/UL (ref 135–450)
PMV BLD AUTO: 7.3 FL (ref 5–10.5)
POTASSIUM SERPL-SCNC: 3.7 MMOL/L (ref 3.5–5.1)
RBC # BLD: 2.32 M/UL (ref 4–5.2)
SODIUM BLD-SCNC: 129 MMOL/L (ref 136–145)
WBC # BLD: 12.9 K/UL (ref 4–11)

## 2021-01-05 PROCEDURE — 82140 ASSAY OF AMMONIA: CPT

## 2021-01-05 PROCEDURE — 99233 SBSQ HOSP IP/OBS HIGH 50: CPT | Performed by: INTERNAL MEDICINE

## 2021-01-05 PROCEDURE — P9016 RBC LEUKOCYTES REDUCED: HCPCS

## 2021-01-05 PROCEDURE — 36415 COLL VENOUS BLD VENIPUNCTURE: CPT

## 2021-01-05 PROCEDURE — 6370000000 HC RX 637 (ALT 250 FOR IP): Performed by: INTERNAL MEDICINE

## 2021-01-05 PROCEDURE — 86901 BLOOD TYPING SEROLOGIC RH(D): CPT

## 2021-01-05 PROCEDURE — 85027 COMPLETE CBC AUTOMATED: CPT

## 2021-01-05 PROCEDURE — 86704 HEP B CORE ANTIBODY TOTAL: CPT

## 2021-01-05 PROCEDURE — 80069 RENAL FUNCTION PANEL: CPT

## 2021-01-05 PROCEDURE — 76705 ECHO EXAM OF ABDOMEN: CPT

## 2021-01-05 PROCEDURE — 86900 BLOOD TYPING SEROLOGIC ABO: CPT

## 2021-01-05 PROCEDURE — 97129 THER IVNTJ 1ST 15 MIN: CPT

## 2021-01-05 PROCEDURE — 87340 HEPATITIS B SURFACE AG IA: CPT

## 2021-01-05 PROCEDURE — 1280000000 HC REHAB R&B

## 2021-01-05 PROCEDURE — 6370000000 HC RX 637 (ALT 250 FOR IP): Performed by: PHYSICAL MEDICINE & REHABILITATION

## 2021-01-05 PROCEDURE — 6360000002 HC RX W HCPCS: Performed by: INTERNAL MEDICINE

## 2021-01-05 PROCEDURE — 97530 THERAPEUTIC ACTIVITIES: CPT

## 2021-01-05 PROCEDURE — 97535 SELF CARE MNGMENT TRAINING: CPT

## 2021-01-05 PROCEDURE — 97110 THERAPEUTIC EXERCISES: CPT

## 2021-01-05 PROCEDURE — 86850 RBC ANTIBODY SCREEN: CPT

## 2021-01-05 PROCEDURE — 36430 TRANSFUSION BLD/BLD COMPNT: CPT

## 2021-01-05 PROCEDURE — 97116 GAIT TRAINING THERAPY: CPT

## 2021-01-05 PROCEDURE — 92507 TX SP LANG VOICE COMM INDIV: CPT

## 2021-01-05 PROCEDURE — 90935 HEMODIALYSIS ONE EVALUATION: CPT

## 2021-01-05 PROCEDURE — 97130 THER IVNTJ EA ADDL 15 MIN: CPT

## 2021-01-05 PROCEDURE — 86706 HEP B SURFACE ANTIBODY: CPT

## 2021-01-05 PROCEDURE — 86923 COMPATIBILITY TEST ELECTRIC: CPT

## 2021-01-05 RX ORDER — SODIUM CHLORIDE 9 MG/ML
INJECTION, SOLUTION INTRAVENOUS PRN
Status: DISCONTINUED | OUTPATIENT
Start: 2021-01-05 | End: 2021-01-09 | Stop reason: HOSPADM

## 2021-01-05 RX ORDER — SODIUM CHLORIDE 0.9 % (FLUSH) 0.9 %
10 SYRINGE (ML) INJECTION PRN
Status: DISCONTINUED | OUTPATIENT
Start: 2021-01-05 | End: 2021-01-09 | Stop reason: HOSPADM

## 2021-01-05 RX ORDER — POTASSIUM CHLORIDE 20 MEQ/1
40 TABLET, EXTENDED RELEASE ORAL DAILY
Status: DISCONTINUED | OUTPATIENT
Start: 2021-01-06 | End: 2021-01-06

## 2021-01-05 RX ORDER — SODIUM CHLORIDE 0.9 % (FLUSH) 0.9 %
10 SYRINGE (ML) INJECTION EVERY 12 HOURS
Status: DISCONTINUED | OUTPATIENT
Start: 2021-01-05 | End: 2021-01-07

## 2021-01-05 RX ADMIN — OXYCODONE 10 MG: 5 TABLET ORAL at 20:27

## 2021-01-05 RX ADMIN — Medication 1 TABLET: at 08:46

## 2021-01-05 RX ADMIN — LACTULOSE 20 G: 20 SOLUTION ORAL at 14:02

## 2021-01-05 RX ADMIN — GABAPENTIN 100 MG: 100 CAPSULE ORAL at 20:27

## 2021-01-05 RX ADMIN — TORSEMIDE 60 MG: 20 TABLET ORAL at 08:46

## 2021-01-05 RX ADMIN — GABAPENTIN 100 MG: 100 CAPSULE ORAL at 14:02

## 2021-01-05 RX ADMIN — LACTULOSE 20 G: 20 SOLUTION ORAL at 20:27

## 2021-01-05 RX ADMIN — POTASSIUM CHLORIDE 40 MEQ: 20 TABLET, EXTENDED RELEASE ORAL at 08:46

## 2021-01-05 RX ADMIN — OXYCODONE 10 MG: 5 TABLET ORAL at 14:06

## 2021-01-05 RX ADMIN — MIDODRINE HYDROCHLORIDE 10 MG: 5 TABLET ORAL at 08:46

## 2021-01-05 RX ADMIN — Medication 1 CAPSULE: at 08:46

## 2021-01-05 RX ADMIN — FOLIC ACID 1 MG: 1 TABLET ORAL at 08:46

## 2021-01-05 RX ADMIN — LACTULOSE 20 G: 20 SOLUTION ORAL at 08:46

## 2021-01-05 RX ADMIN — MIDODRINE HYDROCHLORIDE 10 MG: 5 TABLET ORAL at 12:59

## 2021-01-05 RX ADMIN — OXYCODONE 10 MG: 5 TABLET ORAL at 08:53

## 2021-01-05 RX ADMIN — GABAPENTIN 100 MG: 100 CAPSULE ORAL at 08:46

## 2021-01-05 RX ADMIN — FERROUS SULFATE TAB 325 MG (65 MG ELEMENTAL FE) 324 MG: 325 (65 FE) TAB at 08:46

## 2021-01-05 RX ADMIN — HEPARIN SODIUM 3200 UNITS: 1000 INJECTION INTRAVENOUS; SUBCUTANEOUS at 18:23

## 2021-01-05 RX ADMIN — THIAMINE HCL TAB 100 MG 100 MG: 100 TAB at 08:54

## 2021-01-05 ASSESSMENT — PAIN SCALES - GENERAL
PAINLEVEL_OUTOF10: 0
PAINLEVEL_OUTOF10: 8
PAINLEVEL_OUTOF10: 4
PAINLEVEL_OUTOF10: 0
PAINLEVEL_OUTOF10: 8

## 2021-01-05 ASSESSMENT — PAIN DESCRIPTION - LOCATION
LOCATION: ABDOMEN
LOCATION: ABDOMEN

## 2021-01-05 ASSESSMENT — PAIN DESCRIPTION - PAIN TYPE
TYPE: ACUTE PAIN
TYPE: ACUTE PAIN

## 2021-01-05 NOTE — FLOWSHEET NOTE
01/05/21 1557 01/05/21 1815   Vital Signs   Temp 98 °F (36.7 °C) 98 °F (36.7 °C)   Pulse 122 121   /77 119/70   Height and Weight   Weight 114 lb 3.2 oz (51.8 kg)  (Bed scale) 114 lb 3.2 oz (51.8 kg)  (Bed scale)     Tx completed. No fluid removed per MD order. RIJ catheter worked well. Heaptitis lab sent along with type and screen. Report given.

## 2021-01-05 NOTE — PROGRESS NOTES
Amber Jeanann Dancer  1/5/2021  [id number]    Chief Complaint: Hepatic encephalopathy ([address]    Subjective:   Patient seen this AM.  Patient was NPO this morning before her ultrasound of the gallbladder procedure. Patient just returned from this procedure. Patient had ultrasound-guided paracentesis yesterday per radiology. Patient seen by GI yesterday for her abdominal distention. Labs today reveal improved creatinine level, but her ammonia level is back up. Patient to have hemodialysis today. Patient will be discussed in rehab conference tomorrow. ROS: No n/v, cp, sob, f/c    Objective:  Patient Vitals for the past 24 hrs:   BP Temp Temp src Pulse Resp SpO2   01/05/21 0800 109/68 98.5 °F (36.9 °C) Oral 116 16 98 %   01/04/21 2030 104/61 98 °F (36.7 °C) Oral 119 18 99 %   01/04/21 1715 126/83 -- -- 120 -- --   01/04/21 1130 126/87 -- -- 102 18 --   01/04/21 0830 109/75 98.3 °F (36.8 °C) Oral 118 18 100 %     Gen: No distress, pleasant. HEENT: Normocephalic, atraumatic. CV: Regular rate and rhythm. Resp: No respiratory distress. Abd: Increased abd distention   Ext: No edema. Neuro: Alert, oriented, appropriately interactive.      Wt Readings from Last 3 Encounters:   01/04/21 118 lb 12.8 oz (53.9 kg)   06/17/20 124 lb (56.2 kg)   06/28/16 124 lb (56.2 kg)       Laboratory data:   Lab Results   Component Value Date    WBC 13.1 (H) 01/03/2021    HGB 7.3 (L) 01/03/2021    HCT 23.2 (L) 01/03/2021    MCV 92.4 01/03/2021     (L) 01/03/2021       Lab Results   Component Value Date     01/04/2021    K 3.7 01/04/2021    K 3.6 06/19/2020    CL 98 01/04/2021    CO2 22 01/04/2021    BUN 6 01/04/2021    CREATININE 2.4 01/04/2021    GLUCOSE 137 01/04/2021    CALCIUM 8.5 01/04/2021        Therapy progress:  PT  Position Activity Restriction  Other position/activity restrictions: L foot drop  Objective     Sit to Stand: Stand by assistance  Stand to sit: Stand by assistance  Bed to Chair: Contact guard

## 2021-01-05 NOTE — PROGRESS NOTES
Comprehensive Nutrition Assessment    Type and Reason for Visit:  Reassess    Nutrition Recommendations/Plan:   1. Continue general diet   2. Resume Magic Cups TID  3. Encourage PO intakes   4. Monitor nutrition adequacy, pertinent labs, bowel habits, wt changes, and clinical progress    Nutrition Assessment:  Follow up: Pt s/p paracentesis on 1/4 and US of gallbladder today. Possible plans for EGD and colonoscopy tomorrow. Continues on HD wiht TThS schedule. Pt remains nutritionally at risk AEB fluctuating PO intakes. Will resume ONS for optimal nutrition. Will continue to monitor. Malnutrition Assessment:  Malnutrition Status: At risk for malnutrition (Comment)      Estimated Daily Nutrient Needs:  Energy (kcal):  3943-0214 kcal; Weight Used for Energy Requirements:  Ideal(59 kg)     Protein (g):  70-88 g; Weight Used for Protein Requirements:  Ideal(1.2-1.5 g/kg)        Fluid (ml/day):   ; Method Used for Fluid Requirements:  1 ml/kcal      Nutrition Related Findings:  Jaundice, Hyperactive BS, BM 1/4, +lactulose, +1 pitting BLE edema      Wounds:  None       Current Nutrition Therapies:    DIET GENERAL; Anthropometric Measures:  · Height: 5' 6\" (167.6 cm)  · Current Body Weight: 118 lb (53.5 kg)   · Admission Body Weight: 110 lb (49.9 kg)    · Usual Body Weight: 130 lb (59 kg)(per pt)     · Ideal Body Weight: 130 lbs; % Ideal Body Weight 84.6 %   · BMI: 19.1  · BMI Categories: Normal Weight (BMI 18.5-24. 9)       Nutrition Diagnosis:   · Inadequate oral intake related to inadequate protein-energy intake as evidenced by poor intake prior to admission, weight loss, nausea      Nutrition Interventions:   Food and/or Nutrient Delivery:  Continue Current Diet, Start Oral Nutrition Supplement  Nutrition Education/Counseling:  No recommendation at this time   Coordination of Nutrition Care:  Continue to monitor while inpatient    Goals:  Pt will consume greater than 50% of meals and ONS this ARU stay Nutrition Monitoring and Evaluation:   Food/Nutrient Intake Outcomes:  Food and Nutrient Intake, Supplement Intake  Physical Signs/Symptoms Outcomes:  Biochemical Data, Nausea or Vomiting, Weight     Discharge Planning:    Continue current diet, Continue Oral Nutrition Supplement     Electronically signed by Antonia Briones MS, RD, LD on 1/5/21 at 1:07 PM EST    Contact: [phone]

## 2021-01-05 NOTE — PROGRESS NOTES
Occupational Therapy  Facility/Department: [facility]  Daily Treatment Note  NAME: Mar Stanton  : 1990  MRN: [id number]    Date of Service: 2021    Discharge Recommendations:  24 hour supervision or assist, S Level 1, Home with Home health OT  OT Equipment Recommendations  Equipment Needed: Yes  Mobility Devices: ADL Assistive Devices  ADL Assistive Devices: Shower Chair without back    Assessment   Performance deficits / Impairments: Decreased functional mobility ; Decreased endurance;Decreased coordination;Decreased ADL status; Decreased ROM; Decreased balance;Decreased strength;Decreased safe awareness;Decreased cognition;Decreased fine motor control;Decreased high-level IADLs;Decreased posture  Assessment: First Session: Pt agreeable to OT session. Pt completed toileting and grooming at sink with mod I and good tolerance for standing up to 9 minutes. Pt completed mobility to laundry to fold in stance and then ambulate back to room without rest break. Pt completed mobility with no AD. Pt performed forming words from individual letters with increased time for thinking of words but good spelling and fair FMC to grasp and place letters with mild tremors and no dropping of items. Second Session: Pt completed toileting with mod I and functional mobility with RW to gym with supervision. Pt completed tub transfer with supervision and fair ability to clear LEs over edge of tub. Pt performed BUE ther ex with good strength for 3# weight. Continue POC. Prognosis: Good  OT Education: OT Role;Orientation;Plan of Care;Equipment;Precautions; ADL Adaptive Strategies;Transfer Training;Home Exercise Program;IADL Safety  Patient Education: disease specific regarding [facility], tremors, ADL re-education, ther ex  Barriers to Learning: Pt verbalized understanding but may require reinforcement.   REQUIRES OT FOLLOW UP: Yes  Activity Tolerance  Activity Tolerance: Patient Tolerated treatment well  Safety Devices  Safety Devices in place: Yes  Type of devices: Left in bed;Bed alarm in place;Call light within reach;Nurse notified;Gait belt         Patient Diagnosis(es): There were no encounter diagnoses. has a past medical history of Acute respiratory failure ([facility] ETOH abuse, Hemodialysis patient ([facility] Hep C w/o coma, chronic ([facility] History of blood transfusion, Opiate dependence, [facility] and Thyroid disease. has no past surgical history on file. Restrictions  Restrictions/Precautions  Restrictions/Precautions: General Precautions, Fall Risk  Required Braces or Orthoses?: No  Position Activity Restriction  Other position/activity restrictions: L foot drop  Subjective   General  Chart Reviewed: Yes, Orders, Progress Notes, History and Physical, Labs  Patient assessed for rehabilitation services?: Yes  Additional Pertinent Hx: Hep C, PNA, Alcohol hepatitis, Herion abuse  Response to previous treatment: Patient with no complaints from previous session  Family / Caregiver Present: No  Referring Practitioner: Haven Norris MD  Diagnosis: hepatic encephalopathy  Subjective  Subjective: Pt in bed, just returned from procedure, stomach feeling better today, agreeable to OT session. Pain Assessment  Pain Assessment: 0-10  Pain Level: 4  Pain Type: Acute pain  Pain Location: Abdomen  Pain Descriptors: Aching;Pressure  Non-Pharmaceutical Pain Intervention(s): Ambulation/Increased Activity; Emotional support;Repositioned  Response to Pain Intervention: Patient Satisfied  Vital Signs  Patient Currently in Pain: Yes   Orientation  Orientation  Overall Orientation Status: Within Functional Limits  Objective    ADL  Grooming: Modified independent (to wash hands and brush teeth at sink)  UE Dressing: Independent  Toileting: Modified independent   Instrumental ADL's  Instrumental ADLs: Yes  Light Housekeeping  Light Housekeeping Level:  Other  Light Housekeeping Level of Assistance: Modified independent  Light Housekeeping: to fold Recommendations: Strengthening, Patient/Caregiver Education & Training, Home Management Training, Equipment Evaluation, Education, & procurement, ROM, Balance Training, Functional Mobility Training, Endurance Training, Safety Education & Training, Self-Care / ADL    Goals  Short term goals  Time Frame for Short term goals: 1/2/21- 5 days  Short term goal 1: Pt will complete toileting with SBA. -GOAL MET, SBA with RW 1/1  Short term goal 2: Pt will perform functional transfers with SBA.- GOAL MET, 1/2  Long term goals  Time Frame for Long term goals : 1/7/21- 10 days  Long term goal 1: Pt will perform functional transfers with supervision. Long term goal 2: Pt will complete full body bathing with setup. Long term goal 3: Pt will perform full body dressing with setup. Long term goal 4: Pt will complete grooming with mod I. GOAL MET 1/5/21 Pt performed grooming in stance at sink with mod I.  Long term goal 5: Pt will perform simple homemaking task with supervision. GOAL MET 1/5/21 Pt completed laundry task in stance with supervision.   Patient Goals   Patient goals : \"get better, get stronger, more independent\"       Therapy Time   Individual Concurrent Group Co-treatment   Time In 0800         Time Out 0830         Minutes 30         Timed Code Treatment Minutes: 30 Minutes    Second Session Therapy Time:   Individual Concurrent Group Co-treatment   Time In 1230         Time Out 1300         Minutes 30           Timed Code Treatment Minutes:  30 Minutes    Total Treatment Minutes:  60 minutes      Maya Reeves OT

## 2021-01-05 NOTE — PROGRESS NOTES
Pt returned from Radiology at [phone]. Pt received US guided paracentesis today. Per Radiology, pt was to get procedure tomorrow, but was performed today instead. Order still remains for albumin human 25% IV solution 25 g IV at 100 ml/hr ONCE per GI. Pt has no IV access. Pt has R chest vascatht that is only accessible for HD. Dr. Rajesh Vitale,  perfect served to see ift pt could receive tomorrow in HD due to lack of IV access. Dr. Rajesh Vitale stated that pt was to have this med today, since paracentesis was already completed. Juan Carlos Mcfarlane Spoke to Nephrology, explained situation regarding med and lack of IV access. Nephrology ok with PIV insertion. .   Attempted x 2 with no success. Informed Clinical, who stated \"wait for nightshift RN, because she is good at starting IV's and she will be able to get one started. If she is unsuccessful, call back and I will come down and try. \" Pt still has not had IV med ordered by GI. Oncoming RN and Clinical are both aware.

## 2021-01-05 NOTE — PROGRESS NOTES
moist.  Cor: Heart normal S1 and S2  Lungs: CTA  Abdomen: soft, positive bowel sounds, no hepatomegaly or splenomegaly, distended, non-tender  Extremities: no edema  Neuro: alert and oriented x 3, NAD    Additional Results:     Lab Results   Component Value Date    ALT 40 12/30/2020     (H) 12/30/2020    ALKPHOS 186 (H) 12/30/2020    PROT 6.5 12/30/2020    LABALBU 2.3 (L) 01/04/2021    INR 1.50 (H) 01/04/2021    LIPASE 550.0 (H) 06/17/2020     Lab Results   Component Value Date    WBC 13.1 (H) 01/03/2021    HGB 7.3 (L) 01/03/2021    HCT 23.2 (L) 01/03/2021    MCV 92.4 01/03/2021     (L) 01/03/2021     Lab Results   Component Value Date     01/04/2021    K 3.7 01/04/2021    K 3.6 06/19/2020    CL 98 01/04/2021    CO2 22 01/04/2021    BUN 6 01/04/2021     Lab Results   Component Value Date    CREATININE 2.4 (H) 01/04/2021       A/P  1. suspected hcv/etoh cirrhosis with Jaundice, elevated ammonia, hypoalbuminemia, thrombocytopenia, mild coagulopathy, ascites, ARF - cirrhosis labs pending. Paracentesis negative for sbp. Cipro daily as propy recommended. May need eventual liver biopsy pending above labs. Continue lactulose. Ok to use concurrent xifaxan. 2L fluid restriction. Diuretics per renal if needed. Needs alcohol rehab before any attempt at transplant referral.  2.blood loss anemia - needs egd and colonoscopy. She agrees after some discussion. was planning tomorrow but schedule is full so will plan on Thursday if she does not change her mind. Principal Problem:    Hepatic encephalopathy (HCC)  Active Problems:    Abdominal distension    Jaundice  Resolved Problems:    * No resolved hospital problems. *    Patient Active Problem List   Diagnosis Code    Hepatic encephalopathy ([facility] K72.90    Abdominal distension R14.0    Jaundice R17       Thank you for involving [facility]) Gastroenterology in the hospital care of [facility].   For further questions or concerns, we can best be reached through perfect serv.         GRETCHEN CHAVEZ 1/5/21 6:15 AM EST

## 2021-01-05 NOTE — PATIENT CARE CONFERENCE
[address] [facility]  Inpatient Rehabilitation  Weekly Team Conference Note    Date: 2021  Patient Name: Katt Garza        MRN: [id number]    : 1990  (26 y.o.)  Gender: female   Referring Practitioner: Dr Iggy Robertson  Diagnosis: weakness      Interventions to be utilized toward barriers to discharge, per discipline:  NURSING  Nursing observed barriers to dc: Pain, Anxiety, Decreased endurance and Medical complications  Nursing interventions:  Family Education: no  Fall Risk:  Yes      Physical therapy observed barriers to dc:    Baseline: Lives with fiance and son, one level home, I with all functional mobility, gait and stairs   Current level: S/MI bed mobility, SBA for t/f, gait community distances with RW SBA, CGA without AD, CGA to [place] for stairs   Barriers to DC: Activity tolerance, pain, variable/limited assist available at d/c, multiple AIDAN with UHR, provides care for her son   Needs in order to achieve dc home/next level of care: Likely needs to be I/MI with functional mobility, gait and 1+1 + 7 AIDAN      Physical therapy interventions:   Current Treatment Recommendations: Strengthening, Neuromuscular Re-education, Home Exercise Program, Safety Education & Training, Balance Training, Endurance Training, Patient/Caregiver Education & Training, Functional Mobility Training, Equipment Evaluation, Education, & procurement, Transfer Training, Gait Training, Stair training, Pain Management, Positioning      PHYSICAL THERAPY  PT Equipment Recommendations  Equipment Needed: Yes  Mobility Devices: Jarad Page: Rolling  Other: Pt demonstrates improved safety and stability with gait activities with RW for home and community distances, RW with improve safety, decrease risk of falls and decrease burden of care. Assessment: Pt pleasant and agreeable to PT tx and demonstrates good progress with functional mobility and gait.  Pt completes bed mobility with MI, t/f with and without AD with S and ambulates up to 250' with RW with SBA/S as well as short distances without AD with CGA to SBA. Pt requires modA/[place] for stair activities with UHR, pt reports she is unsure if her home as BHR and just a RHR. Pt demonstrates improved performance and technqiue when cued for BUE support on R railing, increased difficulty with ascending d/t poor quad strength and control. Pt is limited during session by increased pain, decreased strength and decreased balance. Pt will benefit from continued skilled PT in ARU to address above deficits, will continue to progress mobility as tolerated. Occupational therapy observed barriers to dc:    Baseline: mod I all ADLs and transfers   Current level: mod I toileting/grooming, supervision LB ADLs, supervision/mod I transfers   Barriers to DC: none   Needs in order to achieve dc home/next level of care: none    Occupational Therapy interventions:  Current Treatment Recommendations: Strengthening, Patient/Caregiver Education & Training, Home Management Training, Equipment Evaluation, Education, & procurement, ROM, Balance Training, Functional Mobility Training, Endurance Training, Safety Education & Training, Self-Care / ADL      OCCUPATIONAL THERAPY  Assessment: Pt agreeable to OT session. Pt performed standing tasks with improved balance and tolerance to stand for ~5 minutes for functional transfers in bathroom and down hallway. Pt demonstrated good safety for all ADLs with supervision/CGA to stand from TTB but mod I for all other transfers. Pt completed BADLs with mod I and good coordination. Pt exhibited minor tremors but did not inhibit ADL performance this date. Pt with minor unsteadiness clearing foot over edge of tub but completed transfer with supervision. Pt completed HEP with independence. Speech therapy observed barriers to dc:    Baseline:  Independent; manages finances, manages medications, grocery shops, cooks, cleans, does laundry   Current level: mod-severe I/MI with LRAD  4.) Safe d/c home. Discharge Plan   Estimated discharge date: 1/8/2021 after therapy. Destination: home health  Pass:No  Services at Discharge: [address], Occupational Therapy, Speech Therapy and Nursing  Equipment at Discharge: Rolling walker, shower seat (no back needed). Team Members Present at Conference:  : Dion Zaldivar LSW   Occupational Therapist: Jaya Catherine, OTR/L  Physical Therapist: Asher Maciel PT  Speech Therapist: Vivi Rodriguez MA [address] [facility]  Nurse: Anel Tipton RN  Dietician: Herve Draper RDN, LD  : Whitney Summers OTR/L  Psychiatry: N/A    Family members present at conference: No      I led this team conference and I approve the established interdisciplinary plan of care as documented within the medical record of [facility]. MD: Dominique Nogueira.  Trinh Mijares MD 1/6/2021, 11:37 AM

## 2021-01-05 NOTE — PROGRESS NOTES
Progress Note    HISTORY     CC:  Respiratory Failure          We are following for acute kidney injury       Subjective/   HPI:    Still making urine.     Paracentesis on 3.1 l on 1/5   HD on 1/3 w no fluid removal , post wt 51.5 kg    ROS:  Constitutional:  No fevers, No Chills, + weakness  Cardiovascular:  No palpations, + edema  Respiratory:  No wheezing, no cough  Skin:  No rash, no itching  :  No hematuria, no dysuria     Social Hx:  No family at bedside     Scheduled Meds:   potassium chloride  40 mEq Oral BID    lactulose  20 g Oral TID    gabapentin  100 mg Oral TID    torsemide  60 mg Oral Daily    [START ON 1/7/2021] darbepoetin ruba-polysorbate  40 mcg Intravenous Weekly - Thursday    ferrous sulfate  324 mg Oral Daily with breakfast    midodrine  10 mg Oral TID WC    therapeutic multivitamin-minerals  1 tablet Oral Daily    folic acid  1 mg Oral Daily    lactobacillus  1 capsule Oral Daily with breakfast    capsaicin   Topical BID    thiamine mononitrate  100 mg Oral Daily    vitamin D  50,000 Units Oral Weekly     Continuous Infusions:  PRN Meds:.oxyCODONE **OR** oxyCODONE, heparin (porcine), heparin (porcine), sennosides-docusate sodium, acetaminophen, magnesium hydroxide, hydrOXYzine, ondansetron, bisacodyl      EXAM       Objective/     Vitals:    01/04/21 1130 01/04/21 1715 01/04/21 2030 01/05/21 0800   BP: 126/87 126/83 104/61 109/68   Pulse: 102 120 119 116   Resp: 18  18 16   Temp:   98 °F (36.7 °C) 98.5 °F (36.9 °C)   TempSrc:   Oral Oral   SpO2:   99% 98%   Weight:       Height:         24HR INTAKE/OUTPUT:      Intake/Output Summary (Last 24 hours) at 1/5/2021 1459  Last data filed at 1/5/2021 1236  Gross per 24 hour   Intake 560 ml   Output --   Net 560 ml     Constitutional:  Alert  Eyes:  Pupils reactive, sclera clear   Neck:  Normal thyroid, no masses   Cardiovascular:  Regular, no rub  Respiratory:  No distress, no wheezing  Psychiatry:  Flat mood/affect, alert  Abdomen: +bs, soft, nt, no masses   Musculoskeletal: + LE edema, no clubbing   Lymphatics:  No LAD in neck, no supraclavicular nodes   Access: [facility]    MEDICAL DECISION MAKING       Data/  Recent Labs     01/03/21  0945   WBC 13.1*   HGB 7.3*   HCT 23.2*   MCV 92.4   *     Recent Labs     01/03/21  0945 01/04/21  1014   * 132*   K 3.6 3.7   CL 99 98*   CO2 17* 22   GLUCOSE 147* 137*   PHOS 2.7 2.2*   BUN 13 6*   CREATININE 3.9* 2.4*   LABGLOM 13* 23*   GFRAA 16* 28*       Assessment/     Acute Kidney Injury:  KDIGO stage 3  - ATN / septic shock in setting of cirrhosis  - Has been on dialysis with a right TDC  - Showing signs of recovery. No fluid removal with dialysis      Liver disease:  Chronic alcohol abuse, history of hep C  - prior acute alcoholic hepatitis      Respiratory Failure:  MRSA pneumonia and pulmonary edema. Resolved     Anemia of chronic disease:  Hb below target     Hypotension:  Part of cirrhotic physiology.   On midodrine     Plan/   -HD on TTS schedule-Monitor for recovery- HD on 1/5   -Potassium replacement - monitor, decreased to BID from TID on 1/4; now daily from 1/5   -AMINATA for anemia  -Follow labs

## 2021-01-05 NOTE — PROGRESS NOTES
Speech Language Pathology  [facility]: ACUTE REHAB UNIT  SPEECH-LANGUAGE PATHOLOGY      [x] Daily  [] Weekly Care Conference Note  [] Discharge    Patient:Verna Uribe      :1990  PSM:[id number]  Rehab Dx/Hx: Hepatic encephalopathy ([facility] [address] [K72.90]    Precautions: falls  Home situation: Lives at home with fiance and [de-identified] year old, does not work. ST Dx: [] Aphasia  [] Dysarthria  [] Apraxia   [] Oropharyngeal dysphagia [x] Cognitive Impairment  [x] Other: Voice  Date of Admit: 2020  Room #: 7303/4053-23    Current functional status (updated daily):         Pt being seen for : [x] Speech/Language Treatment  [] Dysphagia Treatment [x] Cognitive Treatment  [x] Other: voice tx  Communication: [x]WFL  [] Aphasia  [] Dysarthria  [] Apraxia  [] Pragmatic Impairment [] Non-verbal  [] Hearing Loss  [] Other:   Cognition: [] WFL  [] Mild  [x] Moderate  [x] Severe [] Unable to Assess  [] Other:  Memory: [] WFL  [] Mild  [x] Moderate  [x] Severe [] Unable to Assess  [] Other:  Behavior: [x] Alert  [x] Cooperative  [x]  Pleasant  [] Confused  [] Agitated  [] Uncooperative  [] Distractible [] Motivated  [] Self-Limiting [] Anxious  [] Other:  Endurance:  [x] Adequate for participation in SLP sessions  [] Reduced overall  [] Lethargic  [] Other:  Safety: [] No concerns at this time  [x] Reduced insight into deficits  [x]  Reduced safety awareness [] Not following call light procedures  [] Unable to Assess  [] Other:    Current Diet Order:Diet NPO Time Specified  Swallowing Precautions: HOB 90* and 30\" after meals; small bites/sips; alternate solids/liquids every 3-5 bites; oral care after every meal        Date: 2021      Tx session 1  1100 - 1200 Tx session 2  All needs met in session 1   Total Timed Code Min 60 0   Total Treatment Minutes 60 0   Individual Treatment Minutes 60 0   Group Treatment Minutes 0 0   Co-Treat Minutes 0 0   Variance/Reason:  0 0   Pain None reported.      Pain Intervention [] RN notified  [] Repositioned  [] Intervention offered and patient declined  [x] N/A  [] Other:  [] RN notified  [] Repositioned  [] Intervention offered and patient declined  [x] N/A  [] Other:   Subjective     Pt alert and cooperative, sitting upright in bed. Pt agreeable to tx. Objective:  Goals     Short-term Goals  Timeframe for Short-term Goals: 7 days (01/05/21)    Goal 1: Pt will complete recall tasks with 80% accuracy given mod cues. Word list retention task   -4 words given and then asked a question re: the words  -e.g. purple, orange, yellow, brown. What was the fourth word?  -pt completed task with 70% acc given mod cues. Goal 2: Pt will complete executive function tasks (meds,money, math, time, etc) with 80% accuracy given mod cues. [place] values  -pt presented with various amounts of coins, asked to count the coins  -pt completed with 100% acc given min cues. Goal 3: Pt will complete problem solving and thought organization tasks with 80% accuracy given mod cues. Thought organization task  -pt given 3 items; asked to name the category the items belong to and to add 1 more item  -pt named the category with 80% acc given min cues  -pt added an item to the category with 90% acc given mod cues      Goal 4: Pt will complete verbal and visual reasoning tasks with 80% accuracy given mod cues. Goal not directly targeted. Goal 5: Pt will participate in vocal strengthening exercises for improved vocal quality with min cues. Pt completed diaphragmatic breathing exercises 5x. Goal 6: Pt will increase maximum phonation time to > 8 seconds.    Sustained /ah/  -completed 5 times  -6, 8, 8, 7, 7 averaging 7.2 seconds    Other areas targeted: N/A   N/A   Education:   Edu to pt re: repetition to improve recall, rationale of tx tasks    Safety Devices: [x] Call light within reach  [] Chair alarm activated  [x] Bed alarm activated  [] Other:  [x] Call light within reach  [] Chair alarm

## 2021-01-05 NOTE — PROGRESS NOTES
Physical Therapy  [facility]  Daily Treatment Note  NAME: Jimmy Fine  : 1990  MRN: [id number]    Date of Service: 2021    Discharge Recommendations:  Home with Home health PT, Outpatient PT   PT Equipment Recommendations  Equipment Needed: Yes  Mobility Devices: Clara Cooper: Rolling  Other: Pt demonstrates improved safety and stability with gait activities with RW for home and community distances, RW with improve safety, decrease risk of falls and decrease burden of care. Assessment   Body structures, Functions, Activity limitations: Decreased functional mobility ; Decreased sensation; Increased pain;Decreased balance;Decreased strength;Decreased safe awareness;Decreased endurance  Assessment: Pt pleasant and agreeable to PT tx and demonstrates good progress with functional mobility and gait. Pt completes bed mobility with MI, t/f with and without AD with S and ambulates up to 250' with RW with SBA/S as well as short distances without AD with CGA to SBA. Pt requires modA/Margoth for stair activities with UHR, pt reports she is unsure if her home as BHR and just a RHR. Pt demonstrates improved performance and technqiue when cued for BUE support on R railing, increased difficulty with ascending d/t poor quad strength and control. Pt is limited during session by increased pain, decreased strength and decreased balance. Pt will benefit from continued skilled PT in ARU to address above deficits, will continue to progress mobility as tolerated. Treatment Diagnosis: impaired endurance and functional mobility. Prognosis: Good  Decision Making: Medium Complexity  PT Education: Goals;PT Role;Plan of Care;Transfer Training;Equipment;Gait Training;General Safety; Functional Mobility Training  Patient Education: Importance of OOB mobility and gait, pt demonstrates understanding  Barriers to Learning: none  REQUIRES PT FOLLOW UP: Yes  Activity Tolerance  Activity Tolerance: Patient Tolerated treatment well;Patient limited by pain; Patient limited by endurance     Patient Diagnosis(es): There were no encounter diagnoses. has a past medical history of Acute respiratory failure ([facility] [address] ETOH abuse, Hemodialysis patient ([facility] [address] Hep C w/o coma, chronic ([facility] [address] History of blood transfusion, Opiate dependence, continuous ([facility] [address] and Thyroid disease. has no past surgical history on file. Restrictions  Restrictions/Precautions  Restrictions/Precautions: General Precautions, Fall Risk  Required Braces or Orthoses?: No  Position Activity Restriction  Other position/activity restrictions: L foot drop  Subjective   General  Chart Reviewed: Yes  Additional Pertinent Hx: PMH liver hepatitis, pancreatitis, liver cirrhosis, ascites, portal hypertension, neuropathy, due to increased alcohol abuse  Response To Previous Treatment: Patient reporting fatigue but able to participate. Family / Caregiver Present: No  Referring Practitioner: Dr Gage Teixeira  Subjective  Subjective: Pt seated up right in bed on approach, pleasant and agreeable to PT tx  General Comment  Comments: Pt s/p paracentesis yesterday, reports feeling a little better. Pain Screening  Patient Currently in Pain: Yes  Pain Assessment  Pain Assessment: 0-10  Pain Level: 5  Pain Type: Acute pain  Pain Location: Abdomen  Pain Descriptors: Aching;Pressure  Non-Pharmaceutical Pain Intervention(s): Ambulation/Increased Activity;Repositioned; Therapeutic presence;Distraction  Vital Signs  Patient Currently in Pain: Yes       Orientation  Orientation  Overall Orientation Status: Within Normal Limits    Objective   Bed mobility  Supine to Sit: Modified independent(HOB slightly elevated, use of BR)  Sit to Supine: Unable to assess     Transfers  Sit to Stand: Supervision  Stand to sit: Supervision  Car Transfer: Stand by assistance(With RW)  Comment: T/f completed multiple trials throughout session from various surfaces with and without armrests with S, completed with and Yes  Other exercises 1: SCIFIT level 1.4 x 10 minutes, maintaining average SPM >60 to improve BLE strength/endurance and improve gait through reciprocal UE and LE movements. Addendum: 2nd session  Pt seen in pm for second session, pleasant and agreeable and reports continued 5/10 abdominal pain. Pt participates in further stair training with improved performance as well as gait activities and therapeutic exercises. Bed Mobility:   Supine to/from sit I  Transfers:   Sit to/from stand EOB to RW S   Sit to/from stand standard chair to RW S  Gait/Stairs:  Ambulation 1  Surface: level tile  Device: Rolling Walker  Other Apparatus: (LLE DF assist ace wrap)  Assistance: Stand by assistance;Supervision  Quality of Gait: Step through gait, B decreased step height, LLE foot drop, LLE mild steppage gait, wide ROLO. Pt mildly unsteady no overt LOB. Gait Deviations: Slow Rita;Decreased step length;Decreased step height; Increased ROLO  Distance: 200' x 2  Comments: Seated rest break following longer bouts to recover from fatigue. Stairs/Curb  Stairs?: Yes  Stairs  # Steps : 8  Stairs Height: 6\"  Rails: Right ascending (using BUE on RHR)  Other Apparatus: (LLE DF assist ace wrap)  Assistance: CGA  Pt ascends/descends with step to pattern, continues to demonstrate B quad weakness and slight buckling but no overt LOB and improved performance than earlier session. Therapeutic exercises:   Seated BLE exercises with 1.5lb weight    DF/PF    LAQ    Hip flexion    Hip abduction   X 5 reps consecutive sit to/from stand from EOB without AD S. Intermittent cues for sequence/technique. Pt supine in bed at end of session with all need within reach and bed alarm in place. Goals  Short term goals  Time Frame for Short term goals: Goals to be met by 01/04/21  Short term goal 1: Pt to perform bed mobility.  independently -- GOAL MET MI  Short term goal 2: Pt to perform sit to/from stand to LRAD SBA -- GOAL MET SBA with and without AD  Short term goal 3: Pt to perform gait X 100 ft with LRAD CGA -- GOAL MET up to 200' with RW SBA, 100' without AD CGA  Short term goal 4: Pt to go up and down 8 steps with 1 HR CGA -- GOAL NOT MET but progressing x 5 reps step ups to 6\" step with RW with CGA/Margoth  Long term goals  Time Frame for Long term goals : Goals to be met by 01/07/21  Long term goal 1: Pt to perform sit to/from stand with LRAD: Supervision  Long term goal 2: Pt to perform gait X 150 ft with LRAD supervision  Long term goal 3: Pt to perform 2 curb steps with LRAD supervision  Long term goal 4: Pt to perform 8 steps with 1 HR and LRAD if needed Supervision  Patient Goals   Patient goals : \"to get better, to get stronger\"    Plan    Plan  Times per week: 5 out of 7 days  Times per day: Daily  Plan weeks: 10 days  Current Treatment Recommendations: Strengthening, Neuromuscular Re-education, Home Exercise Program, Safety Education & Training, Balance Training, Endurance Training, Patient/Caregiver Education & Training, Functional Mobility Training, Equipment Evaluation, Education, & procurement, Transfer Training, Gait Training, Stair training, Pain Management, Positioning  Safety Devices  Type of devices:  All fall risk precautions in place, Gait belt, Left in bed, Nurse notified, Call light within reach, Patient at risk for falls, Bed alarm in place  Restraints  Initially in place: No     Therapy Time   Individual Concurrent Group Co-treatment   Time In 0900         Time Out 1000         Minutes 60         Timed Code Treatment Minutes: [facility] Therapy Time:   Individual Concurrent Group Co-treatment   Time In 1300         Time Out 1330         Minutes 30           Timed Code Treatment Minutes:  30 minutes    Total Treatment Minutes:  90 minutes    Aron Cruz, PT, DPT

## 2021-01-06 ENCOUNTER — ANESTHESIA EVENT (OUTPATIENT)
Dept: ENDOSCOPY | Age: 31
DRG: 280 | End: 2021-01-06
Payer: COMMERCIAL

## 2021-01-06 LAB
ALBUMIN SERPL-MCNC: 2.4 G/DL (ref 3.4–5)
AMMONIA: 93 UMOL/L (ref 11–51)
ANION GAP SERPL CALCULATED.3IONS-SCNC: 11 MMOL/L (ref 3–16)
BLOOD BANK DISPENSE STATUS: NORMAL
BLOOD BANK DISPENSE STATUS: NORMAL
BLOOD BANK PRODUCT CODE: NORMAL
BLOOD BANK PRODUCT CODE: NORMAL
BPU ID: NORMAL
BPU ID: NORMAL
BUN BLDV-MCNC: 5 MG/DL (ref 7–20)
CALCIUM SERPL-MCNC: 8.3 MG/DL (ref 8.3–10.6)
CERULOPLASMIN: 23 MG/DL (ref 16–45)
CHLORIDE BLD-SCNC: 95 MMOL/L (ref 99–110)
CO2: 21 MMOL/L (ref 21–32)
CREAT SERPL-MCNC: 1.9 MG/DL (ref 0.6–1.1)
DESCRIPTION BLOOD BANK: NORMAL
DESCRIPTION BLOOD BANK: NORMAL
F-ACTIN AB IGG: 18 UNITS (ref 0–19)
GFR AFRICAN AMERICAN: 37
GFR NON-AFRICAN AMERICAN: 31
GLUCOSE BLD-MCNC: 111 MG/DL (ref 70–99)
HCT VFR BLD CALC: 29.8 % (ref 36–48)
HEMOGLOBIN: 9.6 G/DL (ref 12–16)
MITOCHONDRIAL M2 AB, IGG: 18.6 UNITS (ref 0–24.9)
PHOSPHORUS: 2.3 MG/DL (ref 2.5–4.9)
POTASSIUM SERPL-SCNC: 3.4 MMOL/L (ref 3.5–5.1)
SARS-COV-2, NAAT: NOT DETECTED
SODIUM BLD-SCNC: 127 MMOL/L (ref 136–145)

## 2021-01-06 PROCEDURE — 99233 SBSQ HOSP IP/OBS HIGH 50: CPT | Performed by: INTERNAL MEDICINE

## 2021-01-06 PROCEDURE — 1280000000 HC REHAB R&B

## 2021-01-06 PROCEDURE — 80069 RENAL FUNCTION PANEL: CPT

## 2021-01-06 PROCEDURE — 85014 HEMATOCRIT: CPT

## 2021-01-06 PROCEDURE — 97535 SELF CARE MNGMENT TRAINING: CPT

## 2021-01-06 PROCEDURE — 92507 TX SP LANG VOICE COMM INDIV: CPT

## 2021-01-06 PROCEDURE — 97530 THERAPEUTIC ACTIVITIES: CPT

## 2021-01-06 PROCEDURE — 85018 HEMOGLOBIN: CPT

## 2021-01-06 PROCEDURE — 6370000000 HC RX 637 (ALT 250 FOR IP): Performed by: PHYSICAL MEDICINE & REHABILITATION

## 2021-01-06 PROCEDURE — 6370000000 HC RX 637 (ALT 250 FOR IP): Performed by: INTERNAL MEDICINE

## 2021-01-06 PROCEDURE — 97130 THER IVNTJ EA ADDL 15 MIN: CPT

## 2021-01-06 PROCEDURE — 97116 GAIT TRAINING THERAPY: CPT

## 2021-01-06 PROCEDURE — 36430 TRANSFUSION BLD/BLD COMPNT: CPT

## 2021-01-06 PROCEDURE — U0002 COVID-19 LAB TEST NON-CDC: HCPCS

## 2021-01-06 PROCEDURE — 97129 THER IVNTJ 1ST 15 MIN: CPT

## 2021-01-06 PROCEDURE — 82140 ASSAY OF AMMONIA: CPT

## 2021-01-06 PROCEDURE — 2580000003 HC RX 258: Performed by: PHYSICAL MEDICINE & REHABILITATION

## 2021-01-06 PROCEDURE — 97110 THERAPEUTIC EXERCISES: CPT

## 2021-01-06 RX ORDER — POLYETHYLENE GLYCOL 3350 17 G/17G
119 POWDER, FOR SOLUTION ORAL ONCE
Status: COMPLETED | OUTPATIENT
Start: 2021-01-06 | End: 2021-01-06

## 2021-01-06 RX ORDER — POTASSIUM CHLORIDE 20 MEQ/1
40 TABLET, EXTENDED RELEASE ORAL 2 TIMES DAILY
Status: DISCONTINUED | OUTPATIENT
Start: 2021-01-06 | End: 2021-01-09 | Stop reason: HOSPADM

## 2021-01-06 RX ADMIN — GABAPENTIN 100 MG: 100 CAPSULE ORAL at 11:05

## 2021-01-06 RX ADMIN — MIDODRINE HYDROCHLORIDE 10 MG: 5 TABLET ORAL at 11:07

## 2021-01-06 RX ADMIN — SODIUM CHLORIDE, PRESERVATIVE FREE 10 ML: 5 INJECTION INTRAVENOUS at 04:41

## 2021-01-06 RX ADMIN — OXYCODONE 10 MG: 5 TABLET ORAL at 20:10

## 2021-01-06 RX ADMIN — POLYETHYLENE GLYCOL 3350 119 G: 17 POWDER, FOR SOLUTION ORAL at 19:03

## 2021-01-06 RX ADMIN — POLYETHYLENE GLYCOL 3350 119 G: 17 POWDER, FOR SOLUTION ORAL at 14:48

## 2021-01-06 RX ADMIN — POTASSIUM CHLORIDE 40 MEQ: 20 TABLET, EXTENDED RELEASE ORAL at 20:09

## 2021-01-06 RX ADMIN — FOLIC ACID 1 MG: 1 TABLET ORAL at 11:06

## 2021-01-06 RX ADMIN — ONDANSETRON 4 MG: 4 TABLET, ORALLY DISINTEGRATING ORAL at 14:49

## 2021-01-06 RX ADMIN — BISACODYL 20 MG: 5 TABLET, COATED ORAL at 11:19

## 2021-01-06 RX ADMIN — ERGOCALCIFEROL 50000 UNITS: 1.25 CAPSULE ORAL at 11:05

## 2021-01-06 RX ADMIN — SODIUM CHLORIDE, PRESERVATIVE FREE 10 ML: 5 INJECTION INTRAVENOUS at 17:39

## 2021-01-06 RX ADMIN — TORSEMIDE 60 MG: 20 TABLET ORAL at 11:19

## 2021-01-06 RX ADMIN — LACTULOSE 20 G: 20 SOLUTION ORAL at 20:10

## 2021-01-06 RX ADMIN — Medication 1 CAPSULE: at 11:06

## 2021-01-06 RX ADMIN — OXYCODONE 10 MG: 5 TABLET ORAL at 01:57

## 2021-01-06 RX ADMIN — POTASSIUM CHLORIDE 40 MEQ: 20 TABLET, EXTENDED RELEASE ORAL at 11:04

## 2021-01-06 RX ADMIN — GABAPENTIN 100 MG: 100 CAPSULE ORAL at 20:10

## 2021-01-06 RX ADMIN — OXYCODONE 10 MG: 5 TABLET ORAL at 12:29

## 2021-01-06 RX ADMIN — LACTULOSE 20 G: 20 SOLUTION ORAL at 11:04

## 2021-01-06 RX ADMIN — THIAMINE HCL TAB 100 MG 100 MG: 100 TAB at 11:20

## 2021-01-06 RX ADMIN — OXYCODONE 10 MG: 5 TABLET ORAL at 07:35

## 2021-01-06 RX ADMIN — MIDODRINE HYDROCHLORIDE 10 MG: 5 TABLET ORAL at 17:40

## 2021-01-06 ASSESSMENT — PAIN DESCRIPTION - PROGRESSION: CLINICAL_PROGRESSION: NOT CHANGED

## 2021-01-06 ASSESSMENT — PAIN DESCRIPTION - PAIN TYPE: TYPE: ACUTE PAIN

## 2021-01-06 ASSESSMENT — PAIN SCALES - GENERAL
PAINLEVEL_OUTOF10: 5
PAINLEVEL_OUTOF10: 9
PAINLEVEL_OUTOF10: 8
PAINLEVEL_OUTOF10: 8

## 2021-01-06 ASSESSMENT — PAIN DESCRIPTION - ONSET: ONSET: ON-GOING

## 2021-01-06 ASSESSMENT — PAIN DESCRIPTION - FREQUENCY: FREQUENCY: INTERMITTENT

## 2021-01-06 ASSESSMENT — PAIN DESCRIPTION - LOCATION
LOCATION: ABDOMEN
LOCATION: ABDOMEN

## 2021-01-06 ASSESSMENT — PAIN DESCRIPTION - ORIENTATION: ORIENTATION: RIGHT;LOWER

## 2021-01-06 NOTE — CARE COORDINATION
Writer met with patient to discuss disposition of care conference with interdisciplinary team on  1/6/21             Discussed:  Pt progress with therapy and DC plans. Pt reported that she will Dc home with her parents and fiance. HD is set up thru [address] at [facility]. Recommendations: [facility] AT [facility]    Anticipated discharge date: 1/9/20 after therapy    Patient verbalized understanding of recommendations and anticipated discharge date.    RD Sahu

## 2021-01-06 NOTE — CARE COORDINATION
COSTA attempted to set up pt follow up appointments. COSTA left VM message with Dr Aubrey Yadav office nephrology and at Dr Mildred Velez office for PCP appointment to set F/U appointments. Costa called and [facility] can take the Pt for [facility] services.   RD Taylor

## 2021-01-06 NOTE — PROGRESS NOTES
Physical Therapy  Facility/Department: [facility]  Daily Treatment Note  NAME: Neema Frankel  : 1990  MRN: [id number]    Date of Service: 2021    Discharge Recommendations:  24 hour supervision or assist, Home with Home health PT   PT Equipment Recommendations  Equipment Needed: Yes  Mobility Devices: Delora Gory: Rolling  Other: Pt demonstrates improved safety and stability with gait activities with RW for home and community distances, RW with improve safety, decrease risk of falls and decrease burden of care. Assessment   Body structures, Functions, Activity limitations: Decreased functional mobility ; Decreased sensation; Increased pain;Decreased balance;Decreased strength;Decreased safe awareness;Decreased endurance  Assessment: Pt continues to demo progress with activity tolerance and independence with functional mobility. Mildly limited by c/o abdominal discomfort this afternoon however once pt OOB, she is cooperative and pleasant throughout session. Completes functional transfers with mod I for majority of surfaces and up to supervision for low surfaces. Able to ambulate community distances with mod I-supervision using RW. Pt completed up to 12 steps with SBA-CGA and unilateral rail this afternoon. She does fatigue quickly with activity and requires multiple seated rest breaks however states \"I'm getting stronger. \" Pt reports she has no concerns at this time regarding d/c home  after therapy. Will continue to progress functional mobility as appropriate. Treatment Diagnosis: impaired endurance and functional mobility. Prognosis: Good  Decision Making: Medium Complexity  PT Education: Goals;PT Role;Plan of Care;Transfer Training;Equipment;Gait Training;General Safety; Functional Mobility Training;Precautions  Patient Education: Importance of OOB mobility and gait, safety with stairs; pt demonstrates understanding  REQUIRES PT FOLLOW UP: Yes  Activity Tolerance  Activity Tolerance: Patient Tolerated treatment well;Patient limited by endurance; Patient limited by pain     Patient Diagnosis(es): There were no encounter diagnoses. has a past medical history of Acute respiratory failure ([facility] [address] ETOH abuse, Hemodialysis patient ([facility] [address] Hep C w/o coma, chronic ([facility] [address] History of blood transfusion, Opiate dependence, continuous ([facility] [address] and Thyroid disease. has no past surgical history on file. Restrictions  Restrictions/Precautions  Restrictions/Precautions: General Precautions, Fall Risk  Required Braces or Orthoses?: No  Position Activity Restriction  Other position/activity restrictions: L foot drop, R chest port     Subjective   General  Chart Reviewed: Yes  Additional Pertinent Hx: PMH liver hepatitis, pancreatitis, liver cirrhosis, ascites, portal hypertension, neuropathy, due to increased alcohol abuse  Response To Previous Treatment: Patient reporting fatigue but able to participate. Family / Caregiver Present: No  Referring Practitioner: Dr Janice Bellamy  Subjective  Subjective: Pt sidelying in bed upon arrival and agreeable to PT session with min encouragement  General Comment  Comments: Pt states increased fatigue and abdominal discomfort. Initially reluctant to participate d/t feeling poorly however once OOB, pt reports feeling \"a little better\". Pt does endorse feeling \"a little weaker than yesterday\".   Pain Screening  Patient Currently in Pain: Yes(pt reports abdominal discomfort, no number stated)  Vital Signs  Patient Currently in Pain: Yes(pt reports abdominal discomfort, no number stated)          Orientation  Orientation  Overall Orientation Status: Within Normal Limits     Objective   Bed mobility  Rolling to Left: Modified independent  Rolling to Right: Modified independent  Supine to Sit: Modified independent  Sit to Supine: Modified independent     Transfers  Sit to Stand: Modified independent;Supervision(mod I from majority of surfaces; supervision from low surfaces)  Stand to sit: Modified independent  Bed to Chair: Supervision  Car Transfer: Supervision     Ambulation  Ambulation?: Yes  Ambulation 1  Surface: level tile  Device: Rolling Walker  Other Apparatus: (LLE ace wrap into dorsiflex assist d/t foot drop)  Assistance: Modified Independent;Supervision  Quality of Gait: reciprocal pattern, decreased ronald, decreased B foot clearance and step length, LLE foot drop. No LOB. Increased reliance on UEs and RW as pt fatigues with increased distance. Distance: 200ft + 250ft + 150ft  Comments: seated rest breaks required after each bout d/t fatigue    Stairs/Curb  Stairs?: Yes  Stairs  # Steps : 12  Stairs Height: 4\"  Rails: Right ascending(pt uses BUE on R rail for safety and stability)  Device: No Device  Assistance: Stand by assistance;Contact guard assistance  Comment: via non-reciprocal pattern; pt with min B knee flexion as she fatigues with increased number of steps however no overt knee buckling or LOB. Pt requires seated rest break after stair completion d/t fatigue. Exercises  Gluteal Sets: x 15  Hip Flexion: seated marching x 15 BLE  Hip Abduction: seated clamshells x 15 (squeezing pillow)  Knee Long Arc Quad: x 15 BLE  Ankle Pumps: x15 BLE - heel/toe raises - assist for DF on LLE                        Goals  Short term goals  Time Frame for Short term goals: Goals to be met by 01/04/21  Short term goal 1: Pt to perform bed mobility.  independently -- GOAL MET 1/5, pt completes bed mobility with MI  Short term goal 2: Pt to perform sit to/from stand to LRAD SBA -- GOAL MET 1/2, pt completes transfers with SBA  Short term goal 3: Pt to perform gait X 100 ft with LRAD CGA -- GOAL MET 1/2, up to 200' with RW SBA, 100' without AD CGA  Short term goal 4: Pt to go up and down 8 steps with 1 HR CGA -- GOAL MET 1/6, pt completes 12 steps with SBA-CGA and unilateral rail  Long term goals  Time Frame for Long term goals : Goals to be met by 01/07/21-- goals extended to 1/8 based on updated d/c date  Long term goal 1: Pt to perform sit to/from stand with LRAD: Supervision-- GOAL MET 1/6, pt completes sit<>stand transfers with mod I-supervision  Long term goal 2: Pt to perform gait X 150 ft with LRAD supervision-- GOAL MET 1/6, pt ambulates up to 250ft with supervision-mod I using RW  Long term goal 3: Pt to perform 2 curb steps with LRAD supervision  Long term goal 4: Pt to perform 8 steps with 1 HR and LRAD if needed Supervision  Patient Goals   Patient goals : \"to get better, to get stronger\"    Plan    Plan  Times per week: 5 out of 7 days  Times per day: Daily  Plan weeks: 10 days  Current Treatment Recommendations: Strengthening, Neuromuscular Re-education, Home Exercise Program, Safety Education & Training, Balance Training, Endurance Training, Patient/Caregiver Education & Training, Functional Mobility Training, Equipment Evaluation, Education, & procurement, Transfer Training, Gait Training, Stair training, Pain Management, Positioning  Safety Devices  Type of devices:  All fall risk precautions in place, Call light within reach, Chair alarm in place, Left in chair, Nurse notified  Restraints  Initially in place: No     Therapy Time   Individual Concurrent Group Co-treatment   Time In 1225         Time Out 1335         Minutes 70         Timed Code Treatment Minutes: Dahlia 30, PT, DPT

## 2021-01-06 NOTE — PROGRESS NOTES
history of Acute respiratory failure ([facility] [address] ETOH abuse, Hemodialysis patient ([facility] [address] Hep C w/o coma, chronic ([facility] [address] History of blood transfusion, Opiate dependence, continuous ([facility] [address] and Thyroid disease. has no past surgical history on file. Restrictions  Restrictions/Precautions  Restrictions/Precautions: General Precautions, Fall Risk  Required Braces or Orthoses?: No  Position Activity Restriction  Other position/activity restrictions: L foot drop, R chest port  Subjective   General  Chart Reviewed: Yes, Orders, Progress Notes, History and Physical, Labs  Patient assessed for rehabilitation services?: Yes  Additional Pertinent Hx: Hep C, PNA, Alcohol hepatitis, Herion abuse  Response to previous treatment: Patient with no complaints from previous session  Family / Caregiver Present: No  Referring Practitioner: Camron Britt MD  Diagnosis: hepatic encephalopathy  Subjective  Subjective: Pt in bed, feeling tired from not sleeping much last night, agreeable to OT session. Pain Assessment  Pain Assessment: 0-10  Pain Level: 5  Pain Type: Acute pain  Pain Location: Abdomen  Pain Orientation: Right; Lower  Pain Descriptors: Aching  Non-Pharmaceutical Pain Intervention(s): Ambulation/Increased Activity;Repositioned; Emotional support; Shower  Response to Pain Intervention: Patient Satisfied  Vital Signs  Patient Currently in Pain: Yes   Orientation  Orientation  Overall Orientation Status: Within Functional Limits  Objective    ADL  Feeding: Independent  Grooming: Independent  UE Bathing: Setup(to cover HD port)  LE Bathing: Modified independent   UE Dressing: Independent  LE Dressing: Modified independent         Balance  Sitting Balance: Modified independent   Standing Balance: Supervision(varying from mod I to supervision with fatigue)  Standing Balance  Time: 4:30, 30 seconds x4, 2-3 minutes x2  Activity: transfer to/from bathroom, LB dressing, grooming at sink, functional mobility to/from room, tub transfer  Comment: no AD in bathroom, with RW in hallway  Functional Mobility  Functional - Mobility Device: Rolling Walker  Assist Level: Modified independent   Functional Mobility Comments: mod I with no AD in bathroom  Toilet Transfers  Toilet - Technique: Ambulating  Equipment Used: Standard toilet  Toilet Transfer: Modified independent  Toilet Transfers Comments: use of grab bar on R  Tub Transfers  Tub - Transfer From: Rolling walker  Tub - Transfer Type: To and From  Tub - Transfer To: Standing  Tub - Technique: Ambulating  Tub Transfers: Supervision  Shower Transfers  Shower - Transfer From: [place] - Transfer Type: To and From  Shower - Transfer To: Transfer tub bench  Shower - Technique: Ambulating  Shower Transfers: Contact Guard  Shower Transfers Comments: CGA to stand from shower chair, mod I to transfer into shower     Transfers  Stand Step Transfers: Contact guard assistance  Sit to stand: Contact guard assistance  Stand to sit: Modified independent                       Cognition  Arousal/Alertness: Appropriate responses to stimuli  Following Commands:  Follows multistep commands with increased time  Attention Span: Appears intact  Memory: Appears intact  Safety Judgement: Good awareness of safety precautions  Problem Solving: Assistance required to generate solutions  Insights: Fully aware of deficits  Initiation: Does not require cues  Sequencing: Requires cues for some                    Type of ROM/Therapeutic Exercise  Type of ROM/Therapeutic Exercise: Free weights  Comment: 2#  Exercises  Shoulder Flexion: x15  Shoulder Extension: x15  Horizontal ABduction: x15  Horizontal ADduction: x15  Elbow Extension: x15  Supination: x15  Pronation: x15  Wrist Flexion: x15  Wrist Extension: x15  Other: chest press x15                    Plan   Plan  Times per week: 5/7 days/week  Plan weeks: 10 days  Current Treatment Recommendations: Strengthening, Patient/Caregiver Education & Training, Home Management Training, Equipment Evaluation, Education, & procurement, ROM, Balance Training, Functional Mobility Training, Endurance Training, Safety Education & Training, Self-Care / ADL    Goals  Short term goals  Time Frame for Short term goals: 1/2/21- 5 days  Short term goal 1: Pt will complete toileting with SBA. -GOAL MET, SBA with RW 1/1  Short term goal 2: Pt will perform functional transfers with SBA.- GOAL MET, 1/2  Long term goals  Time Frame for Long term goals : 1/7/21- 10 days  Long term goal 1: Pt will perform functional transfers with supervision. Long term goal 2: Pt will complete full body bathing with setup. GOAL MET 1/6/21 Pt completed full body bathing with setup to cover port. Long term goal 3: Pt will perform full body dressing with setup. GOAL MET 1/6/21 Pt completed full body dressing with mod I.  Long term goal 4: Pt will complete grooming with mod I. GOAL MET 1/5/21 Pt performed grooming in stance at sink with mod I.  Long term goal 5: Pt will perform simple homemaking task with supervision. GOAL MET 1/5/21 Pt completed laundry task in stance with supervision.   Patient Goals   Patient goals : \"get better, get stronger, more independent\"       Therapy Time   Individual Concurrent Group Co-treatment   Time In 0830         Time Out 1000         Minutes 90         Timed Code Treatment Minutes: [address], OT

## 2021-01-06 NOTE — PROGRESS NOTES
[facility]: ACUTE REHAB UNIT  SPEECH-LANGUAGE PATHOLOGY      [x] Daily  [] Weekly Care Conference Note  [] Discharge    Patient:Verna Leonard      :1990  HA  Rehab Dx/Hx: Hepatic encephalopathy ([facility] [address] [K72.90]    Precautions: falls  Home situation: Lives at home with fiance and [de-identified] year old, does not work.   ST Dx: [] Aphasia  [] Dysarthria  [] Apraxia   [] Oropharyngeal dysphagia [x] Cognitive Impairment  [x] Other: Voice  Date of Admit: 2020  Room #: 4897/6027-82    Current functional status (updated daily):         Pt being seen for : [x] Speech/Language Treatment  [] Dysphagia Treatment [x] Cognitive Treatment  [x] Other: voice tx  Communication: [x]WFL  [] Aphasia  [] Dysarthria  [] Apraxia  [] Pragmatic Impairment [] Non-verbal  [] Hearing Loss  [] Other:   Cognition: [] WFL  [] Mild  [x] Moderate  [x] Severe [] Unable to Assess  [] Other:  Memory: [] WFL  [] Mild  [x] Moderate  [x] Severe [] Unable to Assess  [] Other:  Behavior: [x] Alert  [x] Cooperative  [x]  Pleasant  [] Confused  [] Agitated  [] Uncooperative  [] Distractible [] Motivated  [] Self-Limiting [] Anxious  [] Other:  Endurance:  [x] Adequate for participation in SLP sessions  [] Reduced overall  [] Lethargic  [] Other:  Safety: [] No concerns at this time  [x] Reduced insight into deficits  [x]  Reduced safety awareness [] Not following call light procedures  [] Unable to Assess  [] Other:    Current Diet Order:Dietary Nutrition Supplements: Frozen Oral Supplement  DIET CLEAR LIQUID;  Swallowing Precautions: HOB 90* and 30\" after meals; small bites/sips; alternate solids/liquids every 3-5 bites; oral care after every meal        Date: 2021      Tx session 1  1222 - 3044 [address] CCC-SLP Tx session 2  [id number]  Earlene Khan MS CCC-SLP   Total Timed Code Min 30 15   Total Treatment Minutes 30 30   Individual Treatment Minutes 30 30   Group Treatment Minutes 0 0   Co-Treat Minutes 0 0   Variance/Reason:  0 N/A Pain None reported. Pt reports some pain in her side. Pain Intervention [] RN notified  [] Repositioned  [] Intervention offered and patient declined  [x] N/A  [] Other:  [] RN notified  [] Repositioned  [x] Intervention offered and patient declined  [] N/A  [] Other:   Subjective     Pt alert and cooperative, sitting EOB during session. Pt agreeable to tx. Pt upright in chair, alert but fatigued, told SLP she might \"fall asleep on (me)\" but otherwise motivated to participate. Objective:  Timeframe for Short-term Goals: 7 days (01/05/21)       Goal 1: Pt will complete recall tasks with 80% accuracy given mod cues. Immediate Recall  -Trial 1: 4/5 words  -Trial 2: 5/5 words    Delayed Recall  -0/5 following 5 minute delay  -+3 with category cue  -+2 with MC cue   Recall of 16 novel items:  - Grouped into 4 groups of 4 each  - Use of association and repetition strategies     Immediate recall: 13/16     5 min delayed recall: 11/16     Goal 2: Pt will complete executive function tasks (meds,money, math, time, etc) with 80% accuracy given mod cues. Pt completed serial 7s on the MoCA with 100% acc. Clock drawing  -pt accurately lesly contour of clock  -placed numbers and hands accurately  -pt with tremors, thus drawing is not neat, but was done accurately    Did not directly target. Goal 3: Pt will complete problem solving and thought organization tasks with 80% accuracy given mod cues. Pt completed trail making task on MoCA with 100% acc indep  Did not directly target. Goal 4: Pt will complete verbal and visual reasoning tasks with 80% accuracy given mod cues. Pt had difficulty completing the abstraction section of the MoCA - completed with 0/2 acc Verbal reasoning:  - Convergent (name the abstract category): 80% accy, improving to 100% with moderate cueing.   - Divergent (add an item to the abstract category): 80% accy, moderate cueing.      Pt required increased time for processing and verbal cueing. Goal 5: Pt will participate in vocal strengthening exercises for improved vocal quality with min cues. Goal not directly targeted. Vocal hygiene practices reviewed with patient:  - Discussed importance for hydration, humidification, not smoking, etc.     Pt verbalized understanding and participated in conversation well. Goal 6: Pt will increase maximum phonation time to > 8 seconds. Goal not directly targeted. Education provided to patient re: breath support and impact on voice. Discussed deep breaths and diaphragmatic breathing. MPT trials:  - 6.2 seconds  - 6.0 seconds  - 6.1 seconds     Other areas targeted: Pt previously scored a 14/30 on the MoCA during initial evaluation. This date, pt improved and scored 20/30 points. N/A   Education:   SLP edu pt re: re-assessment of cog skills with MoCA, results of MoCA   Education provided to pt re: functional scenarios to carryover compensatory memory strategies once home. Safety Devices: [x] Call light within reach  [] Chair alarm activated  [x] Bed alarm activated  [] Other:  [x] Call light within reach  [x] Chair alarm activated  [] Bed alarm activated  [] Other:    Assessment: Session 1: Pt alert and cooperative and agreeable to tx session. SLP re-administered the MoCA. On initial eval date, pt had received 14/30 points. This date, pt demonstrated improvement and scored a 20/30 on the MoCA. Pt demonstrated improvement within the following areas: problem solving, executive function, calculations, orientation. Pt with difficulty recalling the 5 words on the MoCA after a delay, but able to recall each word with either a semantic cue or category cue. Recall appears to be biggest barrier for d/c. Session 2:  Pt participated well despite reported fatigue this afternoon. Demonstrated improvement with functional recall of up to 16 novel items with both immediate and 5 minute delay.   Presented with open mind to adopt association and repetition memory strategies. Demo'd delayed processing during verbal reasoning tasks and required increased cueing from SLP. Vocal quality still impaired; reduced intensity and hoarse. MPT improving, but impaired when considering the baseline for her age. Cont goals listed above. Plan: Continue as per plan of care.       Additional Information: N/A    Barriers toward progress: Cognitive deficit, reduced insight into deficits  Discharge recommendations:  [] Home independently  [] Home with assistance [x]  24 hour supervision  [] ECF [x] Other: See PT/OT  Continued Tx Upon Discharge: ? [x] Yes [] No [] TBD based on progress while on ARU [] Vital Stim indicated [] Other:   Estimated discharge date: 01/08/21    Interventions used this date:  [x] Speech/Language Treatment  [] Instruction in HEP [] Group [] Dysphagia Treatment [x] Cognitive Treatment   [x] Other: voice      Total Time Breakdown / Charges    Time in Time out Total Time / units   Cognitive Tx 1000  1330 1030  1345 30 min / 2 units  15 min / 1 unit   Speech Tx 1345 1400 15 min / 1 unit   Dysphagia Tx -- -- --       Electronically Signed by  Session 1:  Caleb Simpson [facility] #[id number]  Speech Language Pathologist    Session 2:   [place] [address] #[id number]  Speech-Language Pathologist

## 2021-01-06 NOTE — PROGRESS NOTES
Patient noted with order for 2 units RBC; access includes PIV and Dialysis tunnel cath. Nephrology noted to not use dialysis access unless no other resort. Patient not in agreement to have another IV placed. Administering RBC via R PIV without incident. Will continue to monitor.

## 2021-01-06 NOTE — PROGRESS NOTES
bowel sounds, no hepatomegaly or splenomegaly, distended, non-tender  Extremities: no edema  Neuro: alert and oriented x 3, NAD    Additional Results:     Lab Results   Component Value Date    ALT 40 12/30/2020     (H) 12/30/2020    ALKPHOS 186 (H) 12/30/2020    PROT 6.5 12/30/2020    LABALBU 2.4 (L) 01/06/2021    INR 1.50 (H) 01/04/2021    LIPASE 550.0 (H) 06/17/2020     Lab Results   Component Value Date    WBC 12.9 (H) 01/05/2021    HGB 9.6 (L) 01/06/2021    HCT 29.8 (L) 01/06/2021    MCV 92.0 01/05/2021    PLT 81 (L) 01/05/2021     Lab Results   Component Value Date     01/06/2021    K 3.4 01/06/2021    K 3.6 06/19/2020    CL 95 01/06/2021    CO2 21 01/06/2021    BUN 5 01/06/2021     Lab Results   Component Value Date    CREATININE 1.9 (H) 01/06/2021       A/P  1. suspected hcv/etoh cirrhosis with Jaundice, elevated ammonia, hypoalbuminemia, thrombocytopenia, mild coagulopathy, ascites, ARF - cirrhosis labs pending.  Paracentesis negative for sbp. Cipro daily as propy recommended.  May need eventual liver biopsy pending above labs. Continue lactulose. Ok to use concurrent xifaxan. 2L fluid restriction. Diuretics per renal if needed. Needs alcohol rehab before any attempt at transplant referral.  2.blood loss anemia - needs egd and colonoscopy. Set up for tomorrow. Principal Problem:    Hepatic encephalopathy (HCC)  Active Problems:    Abdominal distension    Jaundice    Alcoholic cirrhosis of liver with ascites (HCC)  Resolved Problems:    * No resolved hospital problems. *    Patient Active Problem List   Diagnosis Code    Hepatic encephalopathy ([address] K72.90    Abdominal distension R14.0    Jaundice S29    Alcoholic cirrhosis of liver with ascites ([facility] [address] K70.31       Thank you for involving [id number]  [facility] in the [facility]. For further questions or concerns, we can best be reached through perfect serv.         GRETCHEN CHAVEZ 1/6/21 8:59 AM EST

## 2021-01-06 NOTE — PROGRESS NOTES
Progress Note    HISTORY     CC:  Respiratory Failure          We are following for acute kidney injury       Subjective/   HPI:    Still making urine.     Paracentesis on 3.1 l on 1/5   HD on 1/3 w no fluid removal , post wt 51.5 kg  HD on 1/5 for 2h wo fluid removal  2 PRBC on 1/5    ROS:  Constitutional:  No fevers, No Chills, + weakness  Cardiovascular:  No palpations, + edema  Respiratory:  No wheezing, no cough  Skin:  No rash, no itching  :  No hematuria, no dysuria     Social Hx:  No family at bedside     Scheduled Meds:   polyethylene glycol  119 g Oral Once    polyethylene glycol  119 g Oral Once    potassium chloride  40 mEq Oral Daily    sodium chloride flush  10 mL Intravenous Q12H    lactulose  20 g Oral TID    gabapentin  100 mg Oral TID    torsemide  60 mg Oral Daily    [START ON 1/7/2021] darbepoetin ruba-polysorbate  40 mcg Intravenous Weekly - Thursday    ferrous sulfate  324 mg Oral Daily with breakfast    midodrine  10 mg Oral TID WC    therapeutic multivitamin-minerals  1 tablet Oral Daily    folic acid  1 mg Oral Daily    lactobacillus  1 capsule Oral Daily with breakfast    capsaicin   Topical BID    thiamine mononitrate  100 mg Oral Daily    vitamin D  50,000 Units Oral Weekly     Continuous Infusions:   sodium chloride       PRN Meds:.sodium chloride, sodium chloride flush, oxyCODONE **OR** oxyCODONE, heparin (porcine), heparin (porcine), sennosides-docusate sodium, acetaminophen, magnesium hydroxide, hydrOXYzine, ondansetron, bisacodyl      EXAM       Objective/     Vitals:    01/06/21 0030 01/06/21 0400 01/06/21 0459 01/06/21 1045   BP: 118/68 110/74 109/74 117/79   Pulse: 118 119 117 104   Resp: 16 16 16 16   Temp: 98.2 °F (36.8 °C) 97.8 °F (36.6 °C) 97.9 °F (36.6 °C) 98 °F (36.7 °C)   TempSrc: Oral Oral Oral Oral   SpO2: 98% 100% 99% 98%   Weight:       Height:         24HR INTAKE/OUTPUT:      Intake/Output Summary (Last 24 hours) at 1/6/2021 1157  Last data

## 2021-01-06 NOTE — PROGRESS NOTES
Verna Hamilton  1/6/2021  [id number]    Chief Complaint: Hepatic encephalopathy ([address]    Subjective:   Feels better after paracentesis. No new c/o. Anxious about egd/cscope tomorrow. ROS: No n/v, cp, sob, f/c    Objective:  Patient Vitals for the past 24 hrs:   BP Temp Temp src Pulse Resp SpO2 Weight   01/06/21 1045 117/79 98 °F (36.7 °C) Oral 104 16 98 % --   01/06/21 0459 109/74 97.9 °F (36.6 °C) Oral 117 16 99 % --   01/06/21 0400 110/74 97.8 °F (36.6 °C) Oral 119 16 100 % --   01/06/21 0030 118/68 98.2 °F (36.8 °C) Oral 118 16 98 % --   01/06/21 0010 114/77 97.7 °F (36.5 °C) Oral 120 16 99 % --   01/05/21 2120 115/75 97.9 °F (36.6 °C) Oral 115 16 100 % --   01/05/21 2101 114/75 97.8 °F (36.6 °C) Oral 119 16 100 % --   01/05/21 2036 112/67 98 °F (36.7 °C) Oral 110 16 100 % --   01/05/21 1815 119/70 98 °F (36.7 °C) -- 121 16 -- 114 lb 3.2 oz (51.8 kg)   01/05/21 1557 124/77 98 °F (36.7 °C) -- 122 16 98 % 114 lb 3.2 oz (51.8 kg)     Gen: No distress, pleasant. HEENT: Normocephalic, atraumatic. CV: Regular rate and rhythm. Resp: No respiratory distress. Abd: Increased abd distention   Ext: No edema. Neuro: Alert, oriented, appropriately interactive.      Wt Readings from Last 3 Encounters:   01/05/21 114 lb 3.2 oz (51.8 kg)   06/17/20 124 lb (56.2 kg)   06/28/16 124 lb (56.2 kg)       Laboratory data:   Lab Results   Component Value Date    WBC 12.9 (H) 01/05/2021    HGB 9.6 (L) 01/06/2021    HCT 29.8 (L) 01/06/2021    MCV 92.0 01/05/2021    PLT 81 (L) 01/05/2021       Lab Results   Component Value Date     01/06/2021    K 3.4 01/06/2021    K 3.6 06/19/2020    CL 95 01/06/2021    CO2 21 01/06/2021    BUN 5 01/06/2021    CREATININE 1.9 01/06/2021    GLUCOSE 111 01/06/2021    CALCIUM 8.3 01/06/2021        Therapy progress:  PT  Position Activity Restriction  Other position/activity restrictions: L foot drop, R chest port  Objective     Sit to Stand: Supervision  Stand to sit: Supervision  Bed to Chair: Contact guard assistance  Device: Rolling Walker  Other Apparatus: (LLE DF assist ace wrap)  Assistance: Stand by assistance, Supervision  Distance: 250' + 200' + 100'  OT  PT Equipment Recommendations  Equipment Needed: Yes  Mobility Devices: Cloteal Saner: Rolling  Other: Pt demonstrates improved safety and stability with gait activities with RW for home and community distances, RW with improve safety, decrease risk of falls and decrease burden of care. Toilet - Technique: Ambulating  Equipment Used: Standard toilet  Toilet Transfers Comments: use of grab bar on R  Assessment        SLP  Current Diet : Regular  Current Liquid Diet : Thin  Diet Solids Recommendation: Regular  Liquid Consistency Recommendation: Thin    Body mass index is 18.43 kg/m². Rehabilitation Diagnosis:  Neurologic, 3.8, Neuromuscular Disorders, e.g. Critical Illness Myopathy, Other Myopathy     Assessment and Plan:  Alcoholic hepatitis with hepatic encephalopathy. Rifaximin discontinued. Cont lactulose, drop to 30ml. Consult GI given worsening distention.      Dysphagia. SLP     MRSA pneumonia with resp failure. Abx completed. Pulm toilet, volume expansion.        Renal failure, hepatorenal vs ATN, now on HD. Nephrology consulted.      Electrolyte abnormalities. Nephrology consulted for HD.      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 Gabapentin; increased dose with improvement in symptoms.      Bladder: Check PVR x 3. [facility] if PVR > 200ml or if any volume is > 500 ml.      Sleep: Trazodone provided prn.      Follow up appointments: GI/hepatology, PCP, nephrology  SARAH: 1/7 home w/ home health  DME: rolling walker, shower stool    Interdisciplinary team conference was held today with entire rehab treatment team including PT, OT, SLP, Dietician, RN, and SW. Discussion focused on progress toward rehab goals and discharge planning. Barriers: weakness, medical comorbidities.  Total treatment time >35 min with greater than 50% spent in care coordination. Jimbo Severino MD 1/6/2021, 11:37 AM

## 2021-01-07 ENCOUNTER — ANESTHESIA (OUTPATIENT)
Dept: ENDOSCOPY | Age: 31
DRG: 280 | End: 2021-01-07
Payer: COMMERCIAL

## 2021-01-07 VITALS — OXYGEN SATURATION: 96 % | SYSTOLIC BLOOD PRESSURE: 101 MMHG | DIASTOLIC BLOOD PRESSURE: 64 MMHG

## 2021-01-07 LAB
ALBUMIN SERPL-MCNC: 2.5 G/DL (ref 3.4–5)
AMMONIA: 96 UMOL/L (ref 11–51)
ANION GAP SERPL CALCULATED.3IONS-SCNC: 14 MMOL/L (ref 3–16)
BUN BLDV-MCNC: 8 MG/DL (ref 7–20)
CALCIUM SERPL-MCNC: 8.9 MG/DL (ref 8.3–10.6)
CHLORIDE BLD-SCNC: 91 MMOL/L (ref 99–110)
CO2: 21 MMOL/L (ref 21–32)
CREAT SERPL-MCNC: 2.4 MG/DL (ref 0.6–1.1)
GFR AFRICAN AMERICAN: 28
GFR NON-AFRICAN AMERICAN: 23
GLUCOSE BLD-MCNC: 85 MG/DL (ref 70–99)
HCG QUALITATIVE: NEGATIVE
HEPATITIS B CORE TOTAL ANTIBODY: NEGATIVE
PHOSPHORUS: 4 MG/DL (ref 2.5–4.9)
POTASSIUM SERPL-SCNC: 3.3 MMOL/L (ref 3.5–5.1)
SODIUM BLD-SCNC: 126 MMOL/L (ref 136–145)

## 2021-01-07 PROCEDURE — 0DB98ZX EXCISION OF DUODENUM, VIA NATURAL OR ARTIFICIAL OPENING ENDOSCOPIC, DIAGNOSTIC: ICD-10-PCS | Performed by: INTERNAL MEDICINE

## 2021-01-07 PROCEDURE — 2709999900 HC NON-CHARGEABLE SUPPLY: Performed by: INTERNAL MEDICINE

## 2021-01-07 PROCEDURE — 3609012900 HC EGD FOREIGN BODY REMOVAL: Performed by: INTERNAL MEDICINE

## 2021-01-07 PROCEDURE — 43247 EGD REMOVE FOREIGN BODY: CPT | Performed by: INTERNAL MEDICINE

## 2021-01-07 PROCEDURE — 6360000002 HC RX W HCPCS: Performed by: INTERNAL MEDICINE

## 2021-01-07 PROCEDURE — 6370000000 HC RX 637 (ALT 250 FOR IP): Performed by: INTERNAL MEDICINE

## 2021-01-07 PROCEDURE — 45380 COLONOSCOPY AND BIOPSY: CPT | Performed by: INTERNAL MEDICINE

## 2021-01-07 PROCEDURE — 2580000003 HC RX 258: Performed by: PHYSICAL MEDICINE & REHABILITATION

## 2021-01-07 PROCEDURE — 88341 IMHCHEM/IMCYTCHM EA ADD ANTB: CPT

## 2021-01-07 PROCEDURE — 2500000003 HC RX 250 WO HCPCS: Performed by: NURSE ANESTHETIST, CERTIFIED REGISTERED

## 2021-01-07 PROCEDURE — 80074 ACUTE HEPATITIS PANEL: CPT

## 2021-01-07 PROCEDURE — 43239 EGD BIOPSY SINGLE/MULTIPLE: CPT | Performed by: INTERNAL MEDICINE

## 2021-01-07 PROCEDURE — 82140 ASSAY OF AMMONIA: CPT

## 2021-01-07 PROCEDURE — 3609010300 HC COLONOSCOPY W/BIOPSY SINGLE/MULTIPLE: Performed by: INTERNAL MEDICINE

## 2021-01-07 PROCEDURE — 6370000000 HC RX 637 (ALT 250 FOR IP): Performed by: PHYSICAL MEDICINE & REHABILITATION

## 2021-01-07 PROCEDURE — 36415 COLL VENOUS BLD VENIPUNCTURE: CPT

## 2021-01-07 PROCEDURE — 7100000010 HC PHASE II RECOVERY - FIRST 15 MIN: Performed by: INTERNAL MEDICINE

## 2021-01-07 PROCEDURE — 3700000001 HC ADD 15 MINUTES (ANESTHESIA): Performed by: INTERNAL MEDICINE

## 2021-01-07 PROCEDURE — 88305 TISSUE EXAM BY PATHOLOGIST: CPT

## 2021-01-07 PROCEDURE — 80069 RENAL FUNCTION PANEL: CPT

## 2021-01-07 PROCEDURE — 3609012400 HC EGD TRANSORAL BIOPSY SINGLE/MULTIPLE: Performed by: INTERNAL MEDICINE

## 2021-01-07 PROCEDURE — 7100000011 HC PHASE II RECOVERY - ADDTL 15 MIN: Performed by: INTERNAL MEDICINE

## 2021-01-07 PROCEDURE — 3700000000 HC ANESTHESIA ATTENDED CARE: Performed by: INTERNAL MEDICINE

## 2021-01-07 PROCEDURE — 88342 IMHCHEM/IMCYTCHM 1ST ANTB: CPT

## 2021-01-07 PROCEDURE — 6360000002 HC RX W HCPCS: Performed by: NURSE ANESTHETIST, CERTIFIED REGISTERED

## 2021-01-07 PROCEDURE — 2580000003 HC RX 258: Performed by: NURSE ANESTHETIST, CERTIFIED REGISTERED

## 2021-01-07 PROCEDURE — 0DBL8ZX EXCISION OF TRANSVERSE COLON, VIA NATURAL OR ARTIFICIAL OPENING ENDOSCOPIC, DIAGNOSTIC: ICD-10-PCS | Performed by: INTERNAL MEDICINE

## 2021-01-07 PROCEDURE — 1280000000 HC REHAB R&B

## 2021-01-07 RX ORDER — DIPHENHYDRAMINE HYDROCHLORIDE 50 MG/ML
12.5 INJECTION INTRAMUSCULAR; INTRAVENOUS
Status: DISCONTINUED | OUTPATIENT
Start: 2021-01-07 | End: 2021-01-07 | Stop reason: HOSPADM

## 2021-01-07 RX ORDER — LIDOCAINE HYDROCHLORIDE 20 MG/ML
INJECTION, SOLUTION EPIDURAL; INFILTRATION; INTRACAUDAL; PERINEURAL PRN
Status: DISCONTINUED | OUTPATIENT
Start: 2021-01-07 | End: 2021-01-07 | Stop reason: SDUPTHER

## 2021-01-07 RX ORDER — LABETALOL HYDROCHLORIDE 5 MG/ML
5 INJECTION, SOLUTION INTRAVENOUS EVERY 10 MIN PRN
Status: DISCONTINUED | OUTPATIENT
Start: 2021-01-07 | End: 2021-01-07 | Stop reason: HOSPADM

## 2021-01-07 RX ORDER — MORPHINE SULFATE 2 MG/ML
2 INJECTION, SOLUTION INTRAMUSCULAR; INTRAVENOUS EVERY 5 MIN PRN
Status: DISCONTINUED | OUTPATIENT
Start: 2021-01-07 | End: 2021-01-07 | Stop reason: HOSPADM

## 2021-01-07 RX ORDER — HYDRALAZINE HYDROCHLORIDE 20 MG/ML
5 INJECTION INTRAMUSCULAR; INTRAVENOUS EVERY 10 MIN PRN
Status: DISCONTINUED | OUTPATIENT
Start: 2021-01-07 | End: 2021-01-07 | Stop reason: HOSPADM

## 2021-01-07 RX ORDER — OXYCODONE HYDROCHLORIDE AND ACETAMINOPHEN 5; 325 MG/1; MG/1
2 TABLET ORAL PRN
Status: DISCONTINUED | OUTPATIENT
Start: 2021-01-07 | End: 2021-01-07 | Stop reason: HOSPADM

## 2021-01-07 RX ORDER — PROMETHAZINE HYDROCHLORIDE 25 MG/ML
6.25 INJECTION, SOLUTION INTRAMUSCULAR; INTRAVENOUS
Status: DISCONTINUED | OUTPATIENT
Start: 2021-01-07 | End: 2021-01-07 | Stop reason: HOSPADM

## 2021-01-07 RX ORDER — PROPOFOL 10 MG/ML
INJECTION, EMULSION INTRAVENOUS PRN
Status: DISCONTINUED | OUTPATIENT
Start: 2021-01-07 | End: 2021-01-07 | Stop reason: SDUPTHER

## 2021-01-07 RX ORDER — MORPHINE SULFATE 2 MG/ML
1 INJECTION, SOLUTION INTRAMUSCULAR; INTRAVENOUS EVERY 5 MIN PRN
Status: DISCONTINUED | OUTPATIENT
Start: 2021-01-07 | End: 2021-01-07 | Stop reason: HOSPADM

## 2021-01-07 RX ORDER — SODIUM CHLORIDE, SODIUM LACTATE, POTASSIUM CHLORIDE, CALCIUM CHLORIDE 600; 310; 30; 20 MG/100ML; MG/100ML; MG/100ML; MG/100ML
INJECTION, SOLUTION INTRAVENOUS CONTINUOUS PRN
Status: DISCONTINUED | OUTPATIENT
Start: 2021-01-07 | End: 2021-01-07 | Stop reason: SDUPTHER

## 2021-01-07 RX ORDER — MEPERIDINE HYDROCHLORIDE 50 MG/ML
12.5 INJECTION INTRAMUSCULAR; INTRAVENOUS; SUBCUTANEOUS EVERY 5 MIN PRN
Status: DISCONTINUED | OUTPATIENT
Start: 2021-01-07 | End: 2021-01-07 | Stop reason: HOSPADM

## 2021-01-07 RX ORDER — ONDANSETRON 2 MG/ML
4 INJECTION INTRAMUSCULAR; INTRAVENOUS PRN
Status: DISCONTINUED | OUTPATIENT
Start: 2021-01-07 | End: 2021-01-07 | Stop reason: HOSPADM

## 2021-01-07 RX ORDER — SODIUM CHLORIDE 0.9 % (FLUSH) 0.9 %
10 SYRINGE (ML) INJECTION 2 TIMES DAILY
Status: DISCONTINUED | OUTPATIENT
Start: 2021-01-07 | End: 2021-01-09 | Stop reason: HOSPADM

## 2021-01-07 RX ORDER — OXYCODONE HYDROCHLORIDE AND ACETAMINOPHEN 5; 325 MG/1; MG/1
1 TABLET ORAL PRN
Status: DISCONTINUED | OUTPATIENT
Start: 2021-01-07 | End: 2021-01-07 | Stop reason: HOSPADM

## 2021-01-07 RX ADMIN — GABAPENTIN 100 MG: 100 CAPSULE ORAL at 21:14

## 2021-01-07 RX ADMIN — POTASSIUM CHLORIDE 40 MEQ: 20 TABLET, EXTENDED RELEASE ORAL at 21:14

## 2021-01-07 RX ADMIN — GABAPENTIN 100 MG: 100 CAPSULE ORAL at 12:03

## 2021-01-07 RX ADMIN — DARBEPOETIN ALFA 100 MCG: 100 INJECTION, SOLUTION INTRAVENOUS; SUBCUTANEOUS at 16:18

## 2021-01-07 RX ADMIN — PROPOFOL 240 MG: 10 INJECTION, EMULSION INTRAVENOUS at 09:23

## 2021-01-07 RX ADMIN — OXYCODONE 10 MG: 5 TABLET ORAL at 12:04

## 2021-01-07 RX ADMIN — OXYCODONE 10 MG: 5 TABLET ORAL at 16:18

## 2021-01-07 RX ADMIN — LACTULOSE 20 G: 20 SOLUTION ORAL at 16:21

## 2021-01-07 RX ADMIN — SODIUM CHLORIDE, SODIUM LACTATE, POTASSIUM CHLORIDE, AND CALCIUM CHLORIDE: .6; .31; .03; .02 INJECTION, SOLUTION INTRAVENOUS at 08:56

## 2021-01-07 RX ADMIN — SODIUM CHLORIDE, PRESERVATIVE FREE 10 ML: 5 INJECTION INTRAVENOUS at 21:15

## 2021-01-07 RX ADMIN — MIDODRINE HYDROCHLORIDE 10 MG: 5 TABLET ORAL at 16:19

## 2021-01-07 RX ADMIN — LIDOCAINE HYDROCHLORIDE 100 MG: 20 INJECTION, SOLUTION EPIDURAL; INFILTRATION; INTRACAUDAL; PERINEURAL at 09:23

## 2021-01-07 RX ADMIN — TORSEMIDE 60 MG: 20 TABLET ORAL at 16:22

## 2021-01-07 RX ADMIN — MIDODRINE HYDROCHLORIDE 10 MG: 5 TABLET ORAL at 12:03

## 2021-01-07 RX ADMIN — LACTULOSE 20 G: 20 SOLUTION ORAL at 21:14

## 2021-01-07 RX ADMIN — OXYCODONE 10 MG: 5 TABLET ORAL at 21:14

## 2021-01-07 ASSESSMENT — PAIN SCALES - GENERAL
PAINLEVEL_OUTOF10: 0
PAINLEVEL_OUTOF10: 6
PAINLEVEL_OUTOF10: 6

## 2021-01-07 NOTE — PROGRESS NOTES
Verna Galvez  1/7/2021  [id number]    Chief Complaint: Hepatic encephalopathy ([address]    Subjective:   Planning for EGD today. No new complaints otherwise. ROS: No n/v, cp, sob, f/c    Objective:  Patient Vitals for the past 24 hrs:   BP Temp Temp src Pulse Resp SpO2   01/06/21 1945 126/86 98.2 °F (36.8 °C) Oral 101 18 96 %   01/06/21 1045 117/79 98 °F (36.7 °C) Oral 104 16 98 %     Gen: No distress, pleasant. HEENT: Normocephalic, atraumatic. CV: Regular rate and rhythm. Resp: No respiratory distress. Abd: Increased abd distention   Ext: No edema. Neuro: Alert, oriented, appropriately interactive.      Wt Readings from Last 3 Encounters:   01/05/21 114 lb 3.2 oz (51.8 kg)   06/17/20 124 lb (56.2 kg)   06/28/16 124 lb (56.2 kg)       Laboratory data:   Lab Results   Component Value Date    WBC 12.9 (H) 01/05/2021    HGB 9.6 (L) 01/06/2021    HCT 29.8 (L) 01/06/2021    MCV 92.0 01/05/2021    PLT 81 (L) 01/05/2021       Lab Results   Component Value Date     01/06/2021    K 3.4 01/06/2021    K 3.6 06/19/2020    CL 95 01/06/2021    CO2 21 01/06/2021    BUN 5 01/06/2021    CREATININE 1.9 01/06/2021    GLUCOSE 111 01/06/2021    CALCIUM 8.3 01/06/2021        Therapy progress:  PT  Position Activity Restriction  Other position/activity restrictions: L foot drop, R chest port  Objective     Sit to Stand: Modified independent, Supervision(mod I from majority of surfaces; supervision from low surfaces)  Stand to sit: Modified independent  Bed to Chair: Supervision  Device: Rolling Walker  Other Apparatus: (LLE ace wrap into dorsiflex assist d/t foot drop)  Assistance: Modified Independent, Supervision  Distance: 200ft + 250ft + 150ft  OT  PT Equipment Recommendations  Equipment Needed: Yes  Mobility Devices: Delora Gory: Rolling  Other: Pt demonstrates improved safety and stability with gait activities with RW for home and community distances, RW with improve safety, decrease risk of falls and decrease burden of care. Toilet - Technique: Ambulating  Equipment Used: Standard toilet  Toilet Transfers Comments: use of grab bar on R  Assessment        SLP  Current Diet : Regular  Current Liquid Diet : Thin  Diet Solids Recommendation: Regular  Liquid Consistency Recommendation: Thin    Body mass index is 18.43 kg/m². Rehabilitation Diagnosis:  Neurologic, 3.8, Neuromuscular Disorders, e.g. Critical Illness Myopathy, Other Myopathy     Assessment and Plan:  Alcoholic hepatitis with hepatic encephalopathy. Rifaximin discontinued. Cont lactulose, drop to 30ml. GI following. EGD/cscope today.     Dysphagia. SLP     MRSA pneumonia with resp failure. Abx completed. Pulm toilet, volume expansion.        Renal failure, hepatorenal vs ATN, now on HD. Nephrology consulted.      Electrolyte abnormalities. Nephrology consulted for HD.      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 Gabapentin; increased dose with improvement in symptoms.      Bladder: Check PVR x 3. [facility] if PVR > 200ml or if any volume is > 500 ml.      Sleep: Trazodone provided prn.      Follow up appointments: GI/hepatology, PCP, nephrology  SARAH: 1/8 home w/ home health  DME: buddy lowry MD 1/7/2021, 8:14 AM

## 2021-01-07 NOTE — DISCHARGE INSTR - COC
Continuity of Care Form    Patient Name: Nikki Mendoza   :  1990  MRN:  [id number]    Admit date:  2020  Discharge date:  2021    Code Status Order: Full Code   Advance Directives:   Advance Care Flowsheet Documentation       Date/Time Healthcare Directive Type of Healthcare Directive Copy in [address] Agent's Name Healthcare Agent's Phone Number    20 1022  Yes, patient has an advance directive for healthcare treatment  Durable power of  for health care  Yes, copy in chart --  -- Liuafrica Gonzalez  -- [phone]    20  No, patient does not have an advance directive for healthcare treatment  --  -- --  --  --            Admitting Physician:  Anne Bishop MD  PCP: No primary care provider on file. Discharging Nurse: [facility] Unit/Room#: [facility] Unit  Discharging Unit Phone Number: [phone]    Emergency Contact:   Extended Emergency Contact Information  Primary Emergency Contact: Martina Lopes  [place] Phone: [phone]  Relation: Parent  Secondary Emergency Contact: Narendra Lopes   [address] Phone: [phone]  Relation: Parent    Past Surgical History:  History reviewed. No pertinent surgical history. Immunization History: There is no immunization history on file for this patient.     Active Problems:  Patient Active Problem List   Diagnosis Code    Hepatic encephalopathy ([facility] [address] K72.90    Abdominal distension R14.0    Jaundice V97    Alcoholic cirrhosis of liver with ascites ([facility] [address] K70.31       Isolation/Infection:   Isolation            No Isolation          Patient Infection Status       Infection Onset Added Last Indicated Last Indicated By Review Planned Expiration Resolved Resolved By    COVID-19 Rule Out 21 COVID-19 (Ordered) 21      Resolved    COVID-19 Rule Out 21 COVID-19 (Ordered)   21 Rule-Out Test Resulted    COVID-19 Rule Out 06/17/20 06/17/20 06/17/20 COVID-19 (Ordered)   06/18/20 Rule-Out Test Resulted            Nurse Assessment:  Last Vital Signs: /67   Pulse 115   Temp 98.1 °F (36.7 °C) (Oral)   Resp 16   Ht 5' 6\" (1.676 m)   Wt 114 lb 3.2 oz (51.8 kg) Comment: Bed scale  SpO2 96%   BMI 18.43 kg/m²     Last documented pain score (0-10 scale): Pain Level: 6  Last Weight:   Wt Readings from Last 1 Encounters:   01/05/21 114 lb 3.2 oz (51.8 kg)     Mental Status:  oriented and alert    IV Access:  - Central Venous Catheter  - site  right and subclavian, insertion date: 12/16/2020    Nursing Mobility/ADLs:  Walking   Assisted- stand by  Transfer  Assisted-stand by  Bathing  Assisted- stand by  [facility]  Assisted  Med Delivery   whole    Wound Care Documentation and Therapy:        Elimination:  Continence:   · Bowel: Yes  · Bladder: Yes  Urinary Catheter: None   Colostomy/Ileostomy/Ileal Conduit: No       Date of Last BM: 1/9/2021    Intake/Output Summary (Last 24 hours) at 1/7/2021 0938  Last data filed at 1/6/2021 1840  Gross per 24 hour   Intake 1600 ml   Output --   Net 1600 ml     I/O last 3 completed shifts: In: 1600 [P.O.:1600]  Out: -     Safety Concerns: At Risk for Falls    Impairments/Disabilities:      None    Nutrition Therapy:  Current Nutrition Therapy:   - Oral Diet:  Low Sodium (2gm)    Routes of Feeding: Oral  Liquids: Thin Liquids  Daily Fluid Restriction: yes - amount 1000 ml  Last Modified Barium Swallow with Video (Video Swallowing Test): not done    Treatments at the Time of Hospital Discharge:   Respiratory Treatments: none    Oxygen Therapy:  is not on home oxygen therapy.   Ventilator:    - No ventilator support    Rehab Therapies: Physical Therapy and Occupational Therapy  Weight Bearing Status/Restrictions: No weight bearing restirctions  Other Medical Equipment (for information only, NOT a DME order):  bath bench, walker  Other Treatments: Dialysis M,W, & F    Patient's personal belongings (please select all that are sent with patient):  None    RN SIGNATURE:  Electronically signed by Vero Gandhi RN on 1/9/21 at 11:20 AM EST    CASE MANAGEMENT/SOCIAL WORK SECTION    Inpatient Status Date: ***    Readmission Risk Assessment Score:  Readmission Risk              Risk of Unplanned Readmission:        15           Discharging to Facility/ Agency   · Name: [facility]   · Address:  · Phone:[phone]  · Fax:[phone]    Dialysis Facility (if applicable)   · Name: [facility]   · [address] [place] [address] Road   · Dialysis Schedule: M-W-F  · Phone:  [phone]  · [phone]    / signature: Electronically signed by RD Garcia on 1/8/21 at 11:15 AM EST    PHYSICIAN SECTION    Prognosis: Good    Condition at Discharge: Stable    Rehab Potential (if transferring to Rehab): Good    Recommended Labs or Other Treatments After Discharge: Follow up with GI/hepatology, nephrology, PCP    Physician Certification: I certify the above information and transfer of Jolene Loomis  is necessary for the continuing treatment of the diagnosis listed and that she requires Home Care for less 30 days.      Update Admission H&P: No change in H&P    PHYSICIAN SIGNATURE:  Electronically signed by Crystal Hu MD on 1/8/21 at 10:58 AM EST

## 2021-01-07 NOTE — OP NOTE
[facility]     [facility] ,  [address] [place] [facility]  Phone: [phone]   NHY:[phone]  [facility] [address], [address] [facility]  Phone: [phone]   CXI:[phone]    EGD & Colonoscopy Procedure Note    Patient: Rebekah Kim  : 1990    Procedure: Esophagogastroduodenoscopy with biopsy and foreign body removal and Colonoscopy with biopsy    Date:  2021     Endoscopist:  Ambar Lopez MD    Referring Physician:  No primary care provider on file. Preoperative Diagnosis:  Hepatic encephalopathy (HCC) [K72.90]    Postoperative Diagnosis:  See impression    Anesthesia: Anesthesia: MAC  ASA Class: 3  Mallampati: I (soft palate, uvula, fauces, tonsillar pillars visible)    Indications: This is a 26y.o. year old female who presents today with cirrhosis, blood loss anemia. Procedure Details  Informed consent was obtained for the procedure, including sedation. Risks of perforation, hemorrhage, adverse drug reaction and aspiration were discussed. The patient was placed in the left lateral decubitus position. Based on the pre-procedure assessment, including review of the patient's medical history, medications, allergies, and review of systems, she had been deemed to be an appropriate candidate for conscious sedation; she was therefore sedated with the medications listed below. The patient was monitored continuously with ECG tracing, pulse oximetry, blood pressure monitoring, and direct observations. A gastroscope was inserted into the mouth and advanced under direct vision to second portion of the duodenum. There was food material in the vaeccula c/w oropharyngeal dysphagia. A careful inspection was made as the gastroscope was withdrawn, including a retroflexed view of the proximal stomach including views of the incisura and cardia; findings and interventions are described below. Rectal examination was performed.   There were large external hemorrhoids. The colonoscope was inserted into the rectum and advanced under direct vision to the cecum, which was identified by the ileocecal valve and appendiceal orifice. The right colon was examined twice as this increases polyp detection especially if other right colon polyps, older age, male, or weiss syndrome. When segments could not be distended with CO2 or air, it was filled/distended with water. The quality of the colonic preparation was good. A careful inspection was made as the colonoscope was withdrawn, including a retroflexed view of the rectum; findings and interventions are described below. Appropriate photodocumentation Was Obtained. If photos taken, they were ordered to be scanned into the medical record. Findings:   -food bolus in the esophagus pushed with the tip of the scope into the stomach  -normal but mild erythema in the esophagus without varices  -portal hypertensive gastropathy  -blunted appearing duodenum villi to the second portion, biopsies taken in the first and second portions of the duodenum to rule out celiac disease.  -patchy colitis with ulcer,   10 mm in size, located in the transverse colon, with a clean base, not actively bleeding, biopsied    - PREP: miralax  - Overall difficulty: mild in degree  - Abdominal pressure: no  - Change in position: no  - Anesthesia issues: no  - Medivator use: no    Specimens: Was Obtained    Complications:   None; patient tolerated the procedure well. Disposition:   PACU - hemodynamically stable. Estimated Blood loss:  none    Withdrawal Time:  7 minutes    Impression:   -See post-procedure diagnoses. Recommendations:  -Await pathology.  -Follow up with GI in 2 weeks. Ok to discharge from GI standpoint.  -Needs rehab and eventual potential referral for OLT when 6 months sober.         Danny Pride 1/7/21 9:50 AM EST

## 2021-01-07 NOTE — PROGRESS NOTES
Progress Note    HISTORY     CC:  Respiratory Failure          We are following for acute kidney injury       Subjective/   HPI:    Paracentesis on 3.1 l on 1/5   HD on 1/3 w no fluid removal , post wt 51.5 kg  HD on 1/5 for 2h wo fluid removal  2 PRBC on 1/5    ROS:  Constitutional:  No fevers, No Chills, + weakness  Cardiovascular:  No palpations, + edema  Respiratory:  No wheezing, no cough  Skin:  No rash, no itching  :  No hematuria, no dysuria     Social Hx:  No family at bedside     Scheduled Meds:   darbepoetin ruba-polysorbate  100 mcg Intravenous Weekly - Thursday    potassium chloride  40 mEq Oral BID    sodium chloride flush  10 mL Intravenous Q12H    lactulose  20 g Oral TID    gabapentin  100 mg Oral TID    torsemide  60 mg Oral Daily    ferrous sulfate  324 mg Oral Daily with breakfast    midodrine  10 mg Oral TID WC    therapeutic multivitamin-minerals  1 tablet Oral Daily    folic acid  1 mg Oral Daily    lactobacillus  1 capsule Oral Daily with breakfast    capsaicin   Topical BID    thiamine mononitrate  100 mg Oral Daily    vitamin D  50,000 Units Oral Weekly     Continuous Infusions:   sodium chloride       PRN Meds:.sodium chloride, sodium chloride flush, oxyCODONE **OR** oxyCODONE, heparin (porcine), heparin (porcine), sennosides-docusate sodium, acetaminophen, magnesium hydroxide, hydrOXYzine, ondansetron, bisacodyl      EXAM       Objective/     Vitals:    01/07/21 0813 01/07/21 0955 01/07/21 1010 01/07/21 1053   BP: 112/67 (!) 99/55 101/66 109/77   Pulse: 115 108  108   Resp: 16 16 16 18   Temp: 98.1 °F (36.7 °C)   98 °F (36.7 °C)   TempSrc: Oral   Oral   SpO2: 96% 95% 96% 96%   Weight:       Height:         24HR INTAKE/OUTPUT:      Intake/Output Summary (Last 24 hours) at 1/7/2021 1329  Last data filed at 1/7/2021 1001  Gross per 24 hour   Intake 1000 ml   Output --   Net 1000 ml     Constitutional:  Alert  Eyes:  Pupils reactive, sclera clear   Neck:  Normal thyroid, no masses   Cardiovascular:  Regular, no rub  Respiratory:  No distress, no wheezing  Psychiatry:  Flat mood/affect, alert  Abdomen: +bs, soft, nt, no masses   Musculoskeletal: + LE edema, no clubbing   Lymphatics:  No LAD in neck, no supraclavicular nodes   Access: [facility]    MEDICAL DECISION MAKING       Data/  Recent Labs     01/05/21  1600 01/06/21  0456   WBC 12.9*  --    HGB 6.7* 9.6*   HCT 21.3* 29.8*   MCV 92.0  --    PLT 81*  --      Recent Labs     01/05/21  1600 01/06/21  0456 01/07/21  1107   * 127* 126*   K 3.7 3.4* 3.3*   CL 96* 95* 91*   CO2 20* 21 21   GLUCOSE 103* 111* 85   PHOS 2.9 2.3* 4.0   BUN 10 5* 8   CREATININE 2.8* 1.9* 2.4*   LABGLOM 20* 31* 23*   GFRAA 24* 37* 28*       Assessment/     Acute Kidney Injury:  KDIGO stage 3  - ATN / septic shock in setting of cirrhosis  - Has been on dialysis with a right TDC  - Showing signs of recovery. No fluid removal with dialysis    Pre HD cr 3.9-->2.8-->2.4 (1/3, 1/5 n 1/7)     Liver disease:  Chronic alcohol abuse, history of hep C  - prior acute alcoholic hepatitis      Respiratory Failure:  MRSA pneumonia and pulmonary edema. Resolved     Anemia of chronic disease:  Hb below target   2 pRBCs on 1/5     Hypotension:  Part of cirrhotic physiology.   On midodrine     Plan/   -HD hold on 1/7  -Monitor for recovery  -Potassium replacement - monitor, decreased to BID from TID on 1/4; now daily from 1/5, BID on 1/6  -AMINATA for anemia  -Follow labs

## 2021-01-07 NOTE — H&P
[address] ,  [address] [facility]  Phone: [phone]  [address] [facility],  [address] [facility], [address] [place] [address]  Phone: [phone]    HPI: Judy Armstrong is a(n)30 y.o. female admitted for work-up and treatment for   Hepatic encephalopathy ([facility] [address] [K72.90]. We are following for suspected hcv/etoh cirrhosis and blood loss anemia. Hg down without overt loss. Received transfusion. Current Hospital Schedued Meds   potassium chloride  40 mEq Oral BID    darbepoetin ruba-polysorbate  100 mcg Subcutaneous Weekly - Thursday    sodium chloride flush  10 mL Intravenous Q12H    lactulose  20 g Oral TID    gabapentin  100 mg Oral TID    torsemide  60 mg Oral Daily    ferrous sulfate  324 mg Oral Daily with breakfast    midodrine  10 mg Oral TID WC    therapeutic multivitamin-minerals  1 tablet Oral Daily    folic acid  1 mg Oral Daily    lactobacillus  1 capsule Oral Daily with breakfast    capsaicin   Topical BID    thiamine mononitrate  100 mg Oral Daily    vitamin D  50,000 Units Oral Weekly     Current Hospital IV Meds   sodium chloride       Current Hospital Prn Meds  meperidine, HYDROmorphone, HYDROmorphone, morphine, morphine, oxyCODONE-acetaminophen **OR** oxyCODONE-acetaminophen, ondansetron, promethazine, diphenhydrAMINE, labetalol, hydrALAZINE, sodium chloride, sodium chloride flush, oxyCODONE **OR** oxyCODONE, heparin (porcine), heparin (porcine), sennosides-docusate sodium, acetaminophen, magnesium hydroxide, hydrOXYzine, ondansetron, bisacodyl    I/O last 3 completed shifts:   In: 1600 [P.O.:1600]  Out: -   /67   Pulse 115   Temp 98.1 °F (36.7 °C) (Oral)   Resp 16   Ht 5' 6\" (1.676 m)   Wt 114 lb 3.2 oz (51.8 kg) Comment: Bed scale  SpO2 96%   BMI 18.43 kg/m²   BMs: multiple with bowel prep  Wt Readings from Last 3 Encounters:   01/05/21 114 lb 3.2 oz (51.8 kg)   06/17/20 124 lb (56.2 kg)   06/28/16 124 lb (56.2 kg)       Physical Exam:  HEENT: anicteric sclera, oropharyngeal membranes pink and moist.  Cor: Heart normal S1 and S2  Lungs: CTA  Abdomen: soft, positive bowel sounds, no hepatomegaly or splenomegaly, non distended, non-tender  Extremities: no edema  Neuro: alert and oriented x 3, NAD    Additional Results:    Lab Results   Component Value Date    ALT 40 12/30/2020     (H) 12/30/2020    ALKPHOS 186 (H) 12/30/2020    PROT 6.5 12/30/2020    LABALBU 2.4 (L) 01/06/2021    INR 1.50 (H) 01/04/2021    LIPASE 550.0 (H) 06/17/2020     Lab Results   Component Value Date    WBC 12.9 (H) 01/05/2021    HGB 9.6 (L) 01/06/2021    HCT 29.8 (L) 01/06/2021    MCV 92.0 01/05/2021    PLT 81 (L) 01/05/2021     Lab Results   Component Value Date     01/06/2021    K 3.4 01/06/2021    K 3.6 06/19/2020    CL 95 01/06/2021    CO2 21 01/06/2021    BUN 5 01/06/2021     Lab Results   Component Value Date    CREATININE 1.9 (H) 01/06/2021       A/P  1. Blood loss anemia - egd and colonoscopy    Principal Problem:    Hepatic encephalopathy (HCC)  Active Problems:    Abdominal distension    Jaundice    Alcoholic cirrhosis of liver with ascites (HCC)  Resolved Problems:    * No resolved hospital problems. *    Patient Active Problem List   Diagnosis Code    Hepatic encephalopathy ([facility] K72.90    Abdominal distension R14.0    Jaundice A86    Alcoholic cirrhosis of liver with ascites ([facility] [address] [facility] K70.31       Thank you for involving [facility] Gastroenterology in the hospital care [facility]. For further questions or concerns, we can best be reached through perfect serv.         Danny Pride 1/7/21 9:17 AM EST

## 2021-01-07 NOTE — ANESTHESIA POSTPROCEDURE EVALUATION
CL                       91 (L)              01/07/2021 11:07 AM        CO2                      21                  01/07/2021 11:07 AM        BUN                      8                   01/07/2021 11:07 AM        CREATININE               2.4 (H)             01/07/2021 11:07 AM        GLUCOSE                  85                  01/07/2021 11:07 AM   COAGS  Lab Results       Component                Value               Date/Time                  PROTIME                  17.5 (H)            01/04/2021 01:47 PM        INR                      1.50 (H)            01/04/2021 01:47 PM     Intake & Output:  @03VSQF@    Nausea & Vomiting:  No    Level of Consciousness:  Awake    Pain Assessment:  Adequate analgesia    Anesthesia Complications:  No apparent anesthetic complications    SUMMARY      Vital signs stable  OK to discharge from Stage I post anesthesia care.   Care transferred from Anesthesiology department on discharge from perioperative area

## 2021-01-07 NOTE — CARE COORDINATION
Costa was able to set all appointment but the one for Nephrology. COTSA was told that the Dr assistant would call SW back to set appointment. If they call back SW will place on LUZ with the other appointments. DME referral was given to Joseph Heredia at [facility]. Pt will DC tomorrow after therapy. RD López called liang at [facility] to see if they had a schedule for HD. She reported they have openings at [place] but they can not give times until the HEP  panel is back.  COSTA will continue to follow   RD López

## 2021-01-07 NOTE — ANESTHESIA PRE PROCEDURE
Department of Anesthesiology  Preprocedure Note       Name:  Valentino Rico   Age:  26 y.o.  :  1990                                          MRN:  [id number]         Date:  2021      Surgeon: Carlie Pina):  Prince Charly MD    Procedure: Procedure(s):  EGD AND COLON DIAGNOSTIC ONLY WITH ANESTHESIA  COLONOSCOPY DIAGNOSTIC    Medications prior to admission:   Prior to Admission medications    Medication Sig Start Date End Date Taking?  Authorizing Provider   methadone 10 MG/5ML solution Take 39 mg by mouth daily    Historical Provider, MD       Current medications:    Current Facility-Administered Medications   Medication Dose Route Frequency Provider Last Rate Last Admin    potassium chloride (KLOR-CON M) extended release tablet 40 mEq  40 mEq Oral BID Rioc Mccarthy MD   Stopped at 21 9739    darbepoetin ruba-polysorbate (ARANESP) injection 100 mcg  100 mcg Subcutaneous Weekly - Thursday Rico Mccarthy MD   Stopped at 21 0826    0.9 % sodium chloride infusion   Intravenous PRN Tony Severino MD        sodium chloride flush 0.9 % injection 10 mL  10 mL Intravenous Q12H Tony Severino MD   Stopped at 21 0826    sodium chloride flush 0.9 % injection 10 mL  10 mL Intravenous PRN Tony Severino MD        lactulose (CHRONULAC) 10 GM/15ML solution 20 g  20 g Oral TID Tam Duvall MD   Stopped at 21 8873    oxyCODONE (ROXICODONE) immediate release tablet 5 mg  5 mg Oral Q4H PRN Tam Duvall MD        Or   Israel Manriquez oxyCODONE (ROXICODONE) immediate release tablet 10 mg  10 mg Oral Q4H PRN Tam Duvall MD   10 mg at 21    gabapentin (NEURONTIN) capsule 100 mg  100 mg Oral TID Humphrey Redmond MD   Stopped at 21 0826    heparin (porcine) injection 2,500 Units  2,500 Units Intravenous PRN Humphrey Redmond MD        heparin (porcine) injection 3,200 Units  3,200 Units Intracatheter PRN Humphrey Redmond MD   3,200 Units at 21 1823  sennosides-docusate sodium (SENOKOT-S) 8.6-50 MG tablet 2 tablet  2 tablet Oral BID PRN Vini Martinez MD        torsemide [facility]) tablet 60 mg  60 mg Oral Daily Isaura Parisi MD   Stopped at 01/07/21 0826    acetaminophen (TYLENOL) tablet 650 mg  650 mg Oral Q4H PRN Almita Severino MD   650 mg at 01/03/21 2123    magnesium hydroxide (MILK OF MAGNESIA) 400 MG/5ML suspension 30 mL  30 mL Oral Daily PRN Almita Severino MD        ferrous sulfate (IRON 325) tablet 324 mg  324 mg Oral Daily with breakfast Quan Naranjo MD   Stopped at 01/07/21 0826    hydrOXYzine (VISTARIL) capsule 50 mg  50 mg Oral BID PRN Almita Severino MD        midodrine (PROAMATINE) tablet 10 mg  10 mg Oral TID WC Quan Naranjo MD   Stopped at 01/07/21 9648    therapeutic multivitamin-minerals 1 tablet  1 tablet Oral Daily Quan Naranjo MD   Stopped at 16/00/92 0098    folic acid (FOLVITE) tablet 1 mg  1 mg Oral Daily Quan Naranjo MD   Stopped at 01/07/21 3052    lactobacillus (CULTURELLE) capsule 1 capsule  1 capsule Oral Daily with breakfast Quan Naranjo MD   Stopped at 01/07/21 0826    capsaicin (ZOSTRIX) 0.025 % cream   Topical BID Quan Naranjo MD   Stopped at 01/07/21 6337    thiamine mononitrate tablet 100 mg  100 mg Oral Daily Almita Severino MD   Stopped at 01/07/21 0826    ondansetron (ZOFRAN-ODT) disintegrating tablet 4 mg  4 mg Oral Q8H PRN Almita Severino MD   4 mg at 01/06/21 1449    bisacodyl (DULCOLAX) suppository 10 mg  10 mg Rectal Daily PRN Quan Naranjo MD        vitamin D (ERGOCALCIFEROL) capsule 50,000 Units  50,000 Units Oral Weekly Vini Martinez MD   50,000 Units at 01/06/21 1105       Allergies:     Allergies   Allergen Reactions    Ceclor [Cefaclor]      Rash when she was a baby        Problem List:    Patient Active Problem List   Diagnosis Code    Hepatic encephalopathy ([address] K72.90    Abdominal distension R14.0    Jaundice R17  Alcoholic cirrhosis of liver with ascites ([place] [address] K70.31       Past Medical History:        Diagnosis Date    Acute respiratory failure ([place] [address] 12/12/2020    ETOH abuse     Hemodialysis patient ([facility]     Hep C w/o coma, chronic ([place] [address] 2012    History of blood transfusion     Opiate dependence, continuous ([place] [address] 2015    Thyroid disease        Past Surgical History:  History reviewed. No pertinent surgical history. Social History:    Social History     Tobacco Use    Smoking status: Current Every Day Smoker     Packs/day: 0.50     Years: 15.00     Pack years: 7.50    Smokeless tobacco: Never Used   Substance Use Topics    Alcohol use: Yes                                Ready to quit: Not Answered  Counseling given: Not Answered      Vital Signs (Current):   Vitals:    01/06/21 0459 01/06/21 1045 01/06/21 1945 01/07/21 0813   BP: 109/74 117/79 126/86 112/67   Pulse: 117 104 101 115   Resp: 16 16 18 16   Temp: 97.9 °F (36.6 °C) 98 °F (36.7 °C) 98.2 °F (36.8 °C) 98.1 °F (36.7 °C)   TempSrc: Oral Oral Oral Oral   SpO2: 99% 98% 96% 96%   Weight:       Height:                                                  BP Readings from Last 3 Encounters:   01/07/21 112/67   06/21/20 122/89   01/28/17 (!) 128/95       NPO Status: Time of last liquid consumption: 2300                        Time of last solid consumption: 2300                        Date of last liquid consumption: 01/06/21                        Date of last solid food consumption: 01/05/21    BMI:   Wt Readings from Last 3 Encounters:   01/05/21 114 lb 3.2 oz (51.8 kg)   06/17/20 124 lb (56.2 kg)   06/28/16 124 lb (56.2 kg)     Body mass index is 18.43 kg/m².     CBC:   Lab Results   Component Value Date    WBC 12.9 01/05/2021    RBC 2.32 01/05/2021    HGB 9.6 01/06/2021    HCT 29.8 01/06/2021    MCV 92.0 01/05/2021    RDW 18.1 01/05/2021    PLT 81 01/05/2021       CMP:   Lab Results   Component Value Date     01/06/2021    K 3.4 01/06/2021 K 3.6 06/19/2020    CL 95 01/06/2021    CO2 21 01/06/2021    BUN 5 01/06/2021    CREATININE 1.9 01/06/2021    GFRAA 37 01/06/2021    AGRATIO 0.5 12/30/2020    LABGLOM 31 01/06/2021    GLUCOSE 111 01/06/2021    PROT 6.5 12/30/2020    CALCIUM 8.3 01/06/2021    BILITOT 13.6 12/30/2020    ALKPHOS 186 12/30/2020     12/30/2020    ALT 40 12/30/2020       POC Tests: No results for input(s): POCGLU, POCNA, POCK, POCCL, POCBUN, POCHEMO, POCHCT in the last 72 hours. Coags:   Lab Results   Component Value Date    PROTIME 17.5 01/04/2021    INR 1.50 01/04/2021       HCG (If Applicable):   Lab Results   Component Value Date    PREGTESTUR Negative 06/17/2020        ABGs: No results found for: PHART, PO2ART, CYP8GFX, RUF7LEZ, BEART, C5WQWLQF     Type & Screen (If Applicable):  No results found for: LABABO, LABRH    Drug/Infectious Status (If Applicable):  No results found for: HIV, HEPCAB    COVID-19 Screening (If Applicable):   Lab Results   Component Value Date    COVID19 Not Detected 01/06/2021    COVID19 Not Detected 07/13/2020         Anesthesia Evaluation  Patient summary reviewed no history of anesthetic complications:   Airway: Mallampati: III  TM distance: >3 FB   Neck ROM: full  Mouth opening: < 3 FB Dental: normal exam         Pulmonary:Negative Pulmonary ROS and normal exam  breath sounds clear to auscultation                             Cardiovascular:Negative CV ROS  Exercise tolerance: good (>4 METS),           Rhythm: regular  Rate: normal                    Neuro/Psych:   Negative Neuro/Psych ROS              GI/Hepatic/Renal: Neg GI/Hepatic/Renal ROS  (+) hepatitis: C, liver disease (cirrhosis): hepatic encephalopathy, renal disease: ESRD and dialysis,           Endo/Other: Negative Endo/Other ROS                    Abdominal:           Vascular: negative vascular ROS.                                     Pre-Operative Diagnosis: Hepatic encephalopathy ([facility] [address] [K72.90] 26 y.o.   BMI:  Body mass index is 18.43 kg/m². Vitals:    01/06/21 0459 01/06/21 1045 01/06/21 1945 01/07/21 0813   BP: 109/74 117/79 126/86 112/67   Pulse: 117 104 101 115   Resp: 16 16 18 16   Temp: 97.9 °F (36.6 °C) 98 °F (36.7 °C) 98.2 °F (36.8 °C) 98.1 °F (36.7 °C)   TempSrc: Oral Oral Oral Oral   SpO2: 99% 98% 96% 96%   Weight:       Height:           Allergies   Allergen Reactions    Ceclor [Cefaclor]      Rash when she was a baby        Social History     Tobacco Use    Smoking status: Current Every Day Smoker     Packs/day: 0.50     Years: 15.00     Pack years: 7.50    Smokeless tobacco: Never Used   Substance Use Topics    Alcohol use: Yes       LABS:    CBC  Lab Results   Component Value Date/Time    WBC 12.9 (H) 01/05/2021 04:00 PM    HGB 9.6 (L) 01/06/2021 04:56 AM    HCT 29.8 (L) 01/06/2021 04:56 AM    PLT 81 (L) 01/05/2021 04:00 PM     RENAL  Lab Results   Component Value Date/Time     (L) 01/06/2021 04:56 AM    K 3.4 (L) 01/06/2021 04:56 AM    K 3.6 06/19/2020 01:13 PM    CL 95 (L) 01/06/2021 04:56 AM    CO2 21 01/06/2021 04:56 AM    BUN 5 (L) 01/06/2021 04:56 AM    CREATININE 1.9 (H) 01/06/2021 04:56 AM    GLUCOSE 111 (H) 01/06/2021 04:56 AM     COAGS  Lab Results   Component Value Date/Time    PROTIME 17.5 (H) 01/04/2021 01:47 PM    INR 1.50 (H) 01/04/2021 01:47 PM     EKG 6/17/2020  Sinus tachycardiaSuspect limb lead reversalAnterior infarct , age undetermined  vs lead malpositionAbnormal ECGConfirmed by Kumar Roth MD, Salty Denney ([id number]) on 6/21/2020 2:30:17 PM       Anesthesia Plan      general     ASA 4     (I discussed with the patient the risks and benefits of PIV, anesthesia, IV Narcotics, PACU. All questions were answered the patient agrees with the plan and wishes to proceed)  Induction: intravenous.                           Barak Soares MD   1/7/2021

## 2021-01-07 NOTE — PROGRESS NOTES
Received from GI team. Opens eyes and verbally responding to orders. Verbalized pain same as before procedure. Passing flatus. Transport notified to return patient to inpatient room. REport call ed assigned floor RN. Report to Christine, assigned RN on floor.

## 2021-01-08 LAB
ALBUMIN SERPL-MCNC: 2.4 G/DL (ref 3.4–5)
AMMONIA: 104 UMOL/L (ref 11–51)
ANION GAP SERPL CALCULATED.3IONS-SCNC: 15 MMOL/L (ref 3–16)
BUN BLDV-MCNC: 11 MG/DL (ref 7–20)
CALCIUM SERPL-MCNC: 8.5 MG/DL (ref 8.3–10.6)
CHLORIDE BLD-SCNC: 90 MMOL/L (ref 99–110)
CO2: 18 MMOL/L (ref 21–32)
CREAT SERPL-MCNC: 2.9 MG/DL (ref 0.6–1.1)
GFR AFRICAN AMERICAN: 23
GFR NON-AFRICAN AMERICAN: 19
GLUCOSE BLD-MCNC: 94 MG/DL (ref 70–99)
HAV IGM SER IA-ACNC: ABNORMAL
HCT VFR BLD CALC: 27 % (ref 36–48)
HEMOGLOBIN: 8.8 G/DL (ref 12–16)
HEPATITIS B CORE IGM ANTIBODY: ABNORMAL
HEPATITIS B SURFACE ANTIGEN INTERPRETATION: ABNORMAL
HEPATITIS C ANTIBODY INTERPRETATION: REACTIVE
MCH RBC QN AUTO: 29 PG (ref 26–34)
MCHC RBC AUTO-ENTMCNC: 32.7 G/DL (ref 31–36)
MCV RBC AUTO: 88.8 FL (ref 80–100)
PDW BLD-RTO: 17.5 % (ref 12.4–15.4)
PHOSPHORUS: 4.4 MG/DL (ref 2.5–4.9)
PLATELET # BLD: 63 K/UL (ref 135–450)
PMV BLD AUTO: 6.8 FL (ref 5–10.5)
POTASSIUM SERPL-SCNC: 3.3 MMOL/L (ref 3.5–5.1)
RBC # BLD: 3.05 M/UL (ref 4–5.2)
SODIUM BLD-SCNC: 123 MMOL/L (ref 136–145)
WBC # BLD: 11.1 K/UL (ref 4–11)

## 2021-01-08 PROCEDURE — 97535 SELF CARE MNGMENT TRAINING: CPT

## 2021-01-08 PROCEDURE — 6360000002 HC RX W HCPCS: Performed by: INTERNAL MEDICINE

## 2021-01-08 PROCEDURE — 92507 TX SP LANG VOICE COMM INDIV: CPT

## 2021-01-08 PROCEDURE — 6370000000 HC RX 637 (ALT 250 FOR IP): Performed by: INTERNAL MEDICINE

## 2021-01-08 PROCEDURE — 85027 COMPLETE CBC AUTOMATED: CPT

## 2021-01-08 PROCEDURE — 6370000000 HC RX 637 (ALT 250 FOR IP): Performed by: PHYSICAL MEDICINE & REHABILITATION

## 2021-01-08 PROCEDURE — 90935 HEMODIALYSIS ONE EVALUATION: CPT

## 2021-01-08 PROCEDURE — 97129 THER IVNTJ 1ST 15 MIN: CPT

## 2021-01-08 PROCEDURE — 80069 RENAL FUNCTION PANEL: CPT

## 2021-01-08 PROCEDURE — 2580000003 HC RX 258: Performed by: PHYSICAL MEDICINE & REHABILITATION

## 2021-01-08 PROCEDURE — 82140 ASSAY OF AMMONIA: CPT

## 2021-01-08 PROCEDURE — 97530 THERAPEUTIC ACTIVITIES: CPT

## 2021-01-08 PROCEDURE — 1280000000 HC REHAB R&B

## 2021-01-08 PROCEDURE — 36415 COLL VENOUS BLD VENIPUNCTURE: CPT

## 2021-01-08 PROCEDURE — 97110 THERAPEUTIC EXERCISES: CPT

## 2021-01-08 PROCEDURE — 97116 GAIT TRAINING THERAPY: CPT

## 2021-01-08 PROCEDURE — 97112 NEUROMUSCULAR REEDUCATION: CPT

## 2021-01-08 PROCEDURE — 97130 THER IVNTJ EA ADDL 15 MIN: CPT

## 2021-01-08 RX ORDER — ERGOCALCIFEROL 1.25 MG/1
50000 CAPSULE ORAL WEEKLY
Qty: 5 CAPSULE | Refills: 0 | Status: SHIPPED | OUTPATIENT
Start: 2021-01-13

## 2021-01-08 RX ORDER — FERROUS SULFATE 325(65) MG
324 TABLET ORAL
Qty: 30 TABLET | Refills: 3 | Status: SHIPPED | OUTPATIENT
Start: 2021-01-09

## 2021-01-08 RX ORDER — HYDROXYZINE PAMOATE 50 MG/1
50 CAPSULE ORAL 2 TIMES DAILY PRN
Qty: 28 CAPSULE | Refills: 0 | Status: SHIPPED | OUTPATIENT
Start: 2021-01-08 | End: 2021-01-22

## 2021-01-08 RX ORDER — GABAPENTIN 100 MG/1
100 CAPSULE ORAL 3 TIMES DAILY
Qty: 90 CAPSULE | Refills: 3 | Status: SHIPPED | OUTPATIENT
Start: 2021-01-08 | End: 2021-02-07

## 2021-01-08 RX ORDER — OXYCODONE HYDROCHLORIDE 5 MG/1
5 TABLET ORAL EVERY 4 HOURS PRN
Qty: 20 TABLET | Refills: 0 | Status: SHIPPED | OUTPATIENT
Start: 2021-01-08 | End: 2021-01-13

## 2021-01-08 RX ORDER — MIDODRINE HYDROCHLORIDE 10 MG/1
10 TABLET ORAL
Qty: 90 TABLET | Refills: 3 | Status: SHIPPED | OUTPATIENT
Start: 2021-01-08

## 2021-01-08 RX ORDER — POTASSIUM CHLORIDE 20 MEQ/1
40 TABLET, EXTENDED RELEASE ORAL 2 TIMES DAILY
Qty: 60 TABLET | Refills: 3 | Status: SHIPPED | OUTPATIENT
Start: 2021-01-08

## 2021-01-08 RX ORDER — TORSEMIDE 20 MG/1
60 TABLET ORAL DAILY
Qty: 90 TABLET | Refills: 0 | Status: SHIPPED | OUTPATIENT
Start: 2021-01-09

## 2021-01-08 RX ORDER — FOLIC ACID 1 MG/1
1 TABLET ORAL DAILY
Qty: 30 TABLET | Refills: 3 | Status: SHIPPED | OUTPATIENT
Start: 2021-01-09

## 2021-01-08 RX ORDER — M-VIT,TX,IRON,MINS/CALC/FOLIC 27MG-0.4MG
1 TABLET ORAL DAILY
Qty: 30 TABLET | Refills: 2 | Status: SHIPPED | OUTPATIENT
Start: 2021-01-09

## 2021-01-08 RX ORDER — GAUZE BANDAGE 2" X 2"
100 BANDAGE TOPICAL DAILY
Qty: 30 TABLET | Refills: 0 | Status: SHIPPED | OUTPATIENT
Start: 2021-01-09

## 2021-01-08 RX ORDER — LACTULOSE 10 G/15ML
20 SOLUTION ORAL 3 TIMES DAILY
Qty: 1 BOTTLE | Refills: 1 | Status: SHIPPED | OUTPATIENT
Start: 2021-01-08

## 2021-01-08 RX ADMIN — SODIUM CHLORIDE, PRESERVATIVE FREE 10 ML: 5 INJECTION INTRAVENOUS at 22:01

## 2021-01-08 RX ADMIN — OXYCODONE 10 MG: 5 TABLET ORAL at 01:37

## 2021-01-08 RX ADMIN — POTASSIUM CHLORIDE 40 MEQ: 20 TABLET, EXTENDED RELEASE ORAL at 08:29

## 2021-01-08 RX ADMIN — TORSEMIDE 60 MG: 20 TABLET ORAL at 08:29

## 2021-01-08 RX ADMIN — POTASSIUM CHLORIDE 40 MEQ: 20 TABLET, EXTENDED RELEASE ORAL at 21:56

## 2021-01-08 RX ADMIN — HEPARIN SODIUM 3200 UNITS: 1000 INJECTION INTRAVENOUS; SUBCUTANEOUS at 20:12

## 2021-01-08 RX ADMIN — LACTULOSE 20 G: 20 SOLUTION ORAL at 21:56

## 2021-01-08 RX ADMIN — GABAPENTIN 100 MG: 100 CAPSULE ORAL at 08:28

## 2021-01-08 RX ADMIN — LACTULOSE 20 G: 20 SOLUTION ORAL at 08:27

## 2021-01-08 RX ADMIN — Medication 1 TABLET: at 08:29

## 2021-01-08 RX ADMIN — MIDODRINE HYDROCHLORIDE 10 MG: 5 TABLET ORAL at 12:08

## 2021-01-08 RX ADMIN — OXYCODONE 10 MG: 5 TABLET ORAL at 21:55

## 2021-01-08 RX ADMIN — FOLIC ACID 1 MG: 1 TABLET ORAL at 08:29

## 2021-01-08 RX ADMIN — OXYCODONE 10 MG: 5 TABLET ORAL at 08:27

## 2021-01-08 RX ADMIN — SODIUM CHLORIDE, PRESERVATIVE FREE 10 ML: 5 INJECTION INTRAVENOUS at 08:34

## 2021-01-08 RX ADMIN — MIDODRINE HYDROCHLORIDE 10 MG: 5 TABLET ORAL at 08:29

## 2021-01-08 RX ADMIN — GABAPENTIN 100 MG: 100 CAPSULE ORAL at 21:55

## 2021-01-08 RX ADMIN — LACTULOSE 20 G: 20 SOLUTION ORAL at 14:52

## 2021-01-08 RX ADMIN — GABAPENTIN 100 MG: 100 CAPSULE ORAL at 14:52

## 2021-01-08 RX ADMIN — Medication 1 CAPSULE: at 08:29

## 2021-01-08 RX ADMIN — THIAMINE HCL TAB 100 MG 100 MG: 100 TAB at 08:30

## 2021-01-08 RX ADMIN — FERROUS SULFATE TAB 325 MG (65 MG ELEMENTAL FE) 324 MG: 325 (65 FE) TAB at 08:29

## 2021-01-08 RX ADMIN — OXYCODONE 10 MG: 5 TABLET ORAL at 14:52

## 2021-01-08 ASSESSMENT — PAIN SCALES - GENERAL
PAINLEVEL_OUTOF10: 8
PAINLEVEL_OUTOF10: 0
PAINLEVEL_OUTOF10: 7
PAINLEVEL_OUTOF10: 7
PAINLEVEL_OUTOF10: 0

## 2021-01-08 ASSESSMENT — PAIN DESCRIPTION - DESCRIPTORS: DESCRIPTORS: ACHING

## 2021-01-08 ASSESSMENT — PAIN DESCRIPTION - PAIN TYPE: TYPE: ACUTE PAIN

## 2021-01-08 ASSESSMENT — PAIN DESCRIPTION - ORIENTATION: ORIENTATION: RIGHT;LEFT;LOWER

## 2021-01-08 ASSESSMENT — PAIN DESCRIPTION - LOCATION: LOCATION: LEG

## 2021-01-08 NOTE — PROGRESS NOTES
Physical Therapy  Facility/Department: [facility]  Daily Treatment Note  NAME: Rohan Leal  : 1990  MRN: [id number]    Date of Service: 2021    Discharge Recommendations:  24 hour supervision or assist, Home with Home health PT   PT Equipment Recommendations  Equipment Needed: Yes  Mobility Devices: Sandra Hodgson: Rolling    Assessment   Body structures, Functions, Activity limitations: Decreased functional mobility ; Decreased sensation; Increased pain;Decreased balance;Decreased strength;Decreased safe awareness;Decreased endurance  Assessment: Pt continues to demo good progress with functional mobility and activity tolerance since admission. Pt able to complete functional transfers with grossly mod I, ambulate community distances with mod I using RW, and up to 8 steps with supervision using unilateral rail. Pt does require therapeutic seated rest breaks throughout session d/t fatigue and deconditioning, however pt with good awareness of need for rest breaks and energy conservation. Pt requesting to practice ambulation, stairs, and tub transfer today in order to increase confidence for completion at home upon d/c this afternoon. After practicing all transfers and mobility during PT sessions, pt reports she has no additional concerns or questions regarding mobility and safety upon d/c home with family this afternoon. Pt has met all established PT goals as of this date. Treatment Diagnosis: impaired endurance and functional mobility. Prognosis: Good  Decision Making: Medium Complexity  PT Education: Goals;PT Role;Plan of Care;Transfer Training;Equipment;Gait Training;General Safety; Functional Mobility Training;Precautions  Patient Education: Educated pt on safety with stairs, safe ambulation, and d/c recs; pt verbalizes good understanding  REQUIRES PT FOLLOW UP: Yes  Activity Tolerance  Activity Tolerance: Patient Tolerated treatment well;Patient limited by endurance     Patient Diagnosis(es): The encounter diagnosis was Alcoholic cirrhosis of liver with ascites ([facility] [address] has a past medical history of Acute respiratory failure ([facility] [address] ETOH abuse, Hemodialysis patient ([facility] [address] Hep C w/o coma, chronic ([facility] [address] History of blood transfusion, Opiate dependence, continuous ([facility] [address] and Thyroid disease. has a past surgical history that includes Upper gastrointestinal endoscopy (N/A, 1/7/2021); Upper gastrointestinal endoscopy (1/7/2021); and Colonoscopy (N/A, 1/7/2021). Restrictions  Restrictions/Precautions  Restrictions/Precautions: General Precautions, Fall Risk  Required Braces or Orthoses?: No  Position Activity Restriction  Other position/activity restrictions: L foot drop, R chest port     Subjective   General  Chart Reviewed: Yes  Additional Pertinent Hx: PMH liver hepatitis, pancreatitis, liver cirrhosis, ascites, portal hypertension, neuropathy, due to increased alcohol abuse  Response To Previous Treatment: Patient with no complaints from previous session. Family / Caregiver Present: No  Referring Practitioner: Dr Andreia Styles  Subjective  Subjective: Pt seated EOB in room upon arrival and agreeable to PT session  General Comment  Comments: Pt requesting to practice stairs and longer distance ambulation this date prior to d/c later this afternoon  Pain Screening  Patient Currently in Pain: Yes(pt reporting mild pain in R abdomen, no number stated. RN aware)  Vital Signs  Patient Currently in Pain: Yes(pt reporting mild pain in R abdomen, no number stated.  RN aware)          Orientation  Orientation  Overall Orientation Status: Within Normal Limits     Objective   Bed mobility (1st session)  Rolling to Left: Independent  Rolling to Right: Independent  Supine to Sit: Independent  Sit to Supine: Independent     Transfers (1st and 2nd sessions)  Sit to Stand: Modified independent  Stand to sit: Modified independent  Bed to Chair: Modified independent(using RW)  Car Transfer: Modified independent(using RW)  Comment: Sit<>stand transfers from toilet with mod I and use of grab bar. Pt also requesting to practice tub transfer as she is mildly worried about being able to lift BLEs in/out of tub when standing. Pt completes tub transfer x 2 reps with supervision. Able to clear each LE over edge of tub with increased time however without physical assist. After practicing tub transfer, pt reports feeling comfortable and reports having no further concerns at this time. Ambulation  Ambulation?: Yes  Ambulation 1  Surface: level tile  Device: Rolling Walker  Assistance: Modified Independent  Quality of Gait: reciprocal pattern, decreased ronald, decreased B foot clearance and step length, LLE foot drop. No LOB. Increased reliance on UEs and RW as pt fatigues with increased distance. Distance 1st session: 250ft + 250ft   Distance 2nd session: 15ft + 15ft (in/out of bathroom) + 150ft + 200ft + 150ft    Stairs/Curb (2nd session)  Stairs?: Yes  Stairs  # Steps : 8(+ 8)  Stairs Height: 6\"  Rails: Right ascending(pt uses BUE on R rail in order to increase stability/support)  Curbs: 6\"  Device: No Device;Rolling walker(no AD for 8 steps; RW for curb step)  Assistance: Supervision  Comment: via non-reciprocal pattern; pt with min B knee flexion as she fatigues with increased number of steps however no overt knee buckling or LOB. Pt requires seated rest break after each bout d/t fatigue. Balance (2nd session)  Picking up 2 cones from floor, each with supervision and unilateral UE support for balance/stability. Goals  Short term goals  Time Frame for Short term goals: Goals to be met by 01/04/21  Short term goal 1: Pt to perform bed mobility.  independently -- GOAL MET 1/5, pt completes bed mobility with MI  Short term goal 2: Pt to perform sit to/from stand to LRAD SBA -- GOAL MET 1/2, pt completes transfers with SBA  Short term goal 3: Pt to perform gait X 100 ft with LRAD CGA -- GOAL MET 1/2, up to 200' with RW SBA, 100' without AD CGA  Short term goal 4: Pt to go up and down 8 steps with 1 HR CGA -- GOAL MET 1/6, pt completes 12 steps with SBA-CGA and unilateral rail  Long term goals  Time Frame for Long term goals : Goals to be met by 01/07/21-- goals extended to 1/8 based on updated d/c date  Long term goal 1: Pt to perform sit to/from stand with LRAD: Supervision-- GOAL MET 1/6, pt completes sit<>stand transfers with mod I-supervision  Long term goal 2: Pt to perform gait X 150 ft with LRAD supervision-- GOAL MET 1/6, pt ambulates up to 250ft with supervision-mod I using RW  Long term goal 3: Pt to perform 2 curb steps with LRAD supervision-- GOAL MET 1/8, pt completes 2 curb steps with supervision using RW  Long term goal 4: Pt to perform 8 steps with 1 HR and LRAD if needed Supervision-- GOAL MET 1/8, pt completes 8 steps with supervision using unilateral rail  Patient Goals   Patient goals : \"to get better, to get stronger\"    Plan    Plan  Times per week: 5 out of 7 days  Times per day: Daily  Plan weeks: 10 days  Current Treatment Recommendations: Strengthening, Neuromuscular Re-education, Home Exercise Program, Safety Education & Training, Balance Training, Endurance Training, Patient/Caregiver Education & Training, Functional Mobility Training, Equipment Evaluation, Education, & procurement, Transfer Training, Gait Training, Stair training, Pain Management, Positioning  Safety Devices  Type of devices:  All fall risk precautions in place, Bed alarm in place, Call light within reach, Left in bed, Nurse notified  Restraints  Initially in place: No     Therapy Time   Individual Concurrent Group Co-treatment   Time In 1100         Time Out 1130         Minutes 30         Timed Code Treatment Minutes: 30 Minutes      Second Session Therapy Time:   Individual Concurrent Group Co-treatment   Time In 1230         Time Out 1330         Minutes 60         Timed Code Treatment Minutes:  60    Total Treatment Minutes: [facility] [address]

## 2021-01-08 NOTE — PROGRESS NOTES
[facility]: ACUTE REHAB UNIT  SPEECH-LANGUAGE PATHOLOGY      [x] Daily  [] Weekly Care Conference Note  [] Discharge    Patient:Verna Hutton      :1990  IQJ:[id number]  Rehab Dx/Hx: Hepatic encephalopathy ([facility] [address] [K72.90]    Precautions: falls  Home situation: Lives at home with fiance and [de-identified] year old, does not work. ST Dx: [] Aphasia  [] Dysarthria  [] Apraxia   [] Oropharyngeal dysphagia [x] Cognitive Impairment  [x] Other: Voice  Date of Admit: 2020  Room #: 1160/6966-40    Current functional status (updated daily):         Pt being seen for : [x] Speech/Language Treatment  [] Dysphagia Treatment [x] Cognitive Treatment  [x] Other: voice tx  Communication: [x]WFL  [] Aphasia  [] Dysarthria  [] Apraxia  [] Pragmatic Impairment [] Non-verbal  [] Hearing Loss  [] Other:   Cognition: [] WFL  [] Mild  [x] Moderate  [x] Severe [] Unable to Assess  [] Other:  Memory: [] WFL  [] Mild  [x] Moderate  [x] Severe [] Unable to Assess  [] Other:  Behavior: [x] Alert  [x] Cooperative  [x]  Pleasant  [] Confused  [] Agitated  [] Uncooperative  [] Distractible [] Motivated  [] Self-Limiting [] Anxious  [] Other:  Endurance:  [x] Adequate for participation in SLP sessions  [] Reduced overall  [] Lethargic  [] Other:  Safety: [] No concerns at this time  [x] Reduced insight into deficits  [x]  Reduced safety awareness [] Not following call light procedures  [] Unable to Assess  [] Other:    Current Diet Order:DIET LOW SODIUM 2 GM; 1500 ml  Swallowing Precautions: HOB 90* and 30\" after meals; small bites/sips; alternate solids/liquids every 3-5 bites; oral care after every meal        Date: 2021      Tx session 1  1000 - 1100 Discharge Summary   Total Timed Code Min 60 0   Total Treatment Minutes 60 0   Individual Treatment Minutes 60 0   Group Treatment Minutes 0 0   Co-Treat Minutes 0 0   Variance/Reason:  0 N/A   Pain None reported.   N/A    Pain Intervention [] RN notified  [] Repositioned  [] Intervention offered and patient declined  [x] N/A  [] Other:  [] RN notified  [] Repositioned  [] Intervention offered and patient declined  [x] N/A  [] Other:   Subjective     Pt alert and cooperative, sitting EOB. Pt agreeable to tx. Objective:  Timeframe for Short-term Goals: 7 days (01/05/21)       Goal 1: Pt will complete recall tasks with 80% accuracy given mod cues. Word List retention task  -SLP provided pt with 5 words  -pt able to immediately recall the 5 words with 70% ac given min cues    -SLP then asked a word list retention question (e.g. what was the middle word)  -pt with more difficulty completing this task, averaged just 40% acc given mod cues. Goal not met: Pt demonstrated ability to complete recall tasks with an average of 65% acc given min-mod cues. Pt's recall does range from recalling 40% of information - 70% acc with delayed recall tasks. Pt immediate memory averages 80% acc with min cues, but pt with decreased recall following a delay. SLP has educated pt on memory strategies to improve recall - repetition, grouping/chunking, writing information down, association. Recommend continued speech tx in order to continue to improve recall. Goal 2: Pt will complete executive function tasks (meds,money, math, time, etc) with 80% accuracy given mod cues. Math calculation  -pt given various amounts of bills and coins  -pt counted the money with 80% acc; improved to 100% acc with mod cues. Got met: Pt demonstrated improvement with ability to use executive function abilities with med management, money management, math, and time. Pt has averaged 80% acc given mod cues for math and money management. Pt would benefit from supervision for med management in order to ensure pt is able to recall accurate medications. Goal 3: Pt will complete problem solving and thought organization tasks with 80% accuracy given mod cues.    Pt shown picture cards with problem scenarios  -pt identified the problem with 80% acc given mod cues  -pt identified why it was a problem with 90% acc given mod cues  -pt identified a solution with 80% acc given mod cues    Goal met: Pt demonstrated improvement with problem solving tasks and thought organization tasks - averaged 80% acc given mod cues. Pt would benefit from continued speech therapy in order to target higher level problem solving tasks at home level. Goal 4: Pt will complete verbal and visual reasoning tasks with 80% accuracy given mod cues. Pt completed visual reasoning tasks with 90% acc given mod cues from SLP. Sequencing task  -pt given 4 items, and asked to sequence the words by degree  -pt completed task with 90% acc given mod cues. Goal met: Pt demonstrated improvement with reasoning tasks, ranging from % given mod cues. Pt requires increase time to process information. Goal 5: Pt will participate in vocal strengthening exercises for improved vocal quality with min cues. Edu re: vocal hygiene practices - hydration, not over using voice, vocal rest, humidification. Pt verbalized understanding. Goal met: Pt verbalized and demonstrated understanding with use of vocal hygiene practices with occ cues required. Goal 6: Pt will increase maximum phonation time to > 8 seconds. 5 trials of sustained /ah/  -8.5, 6.0, 6.9, 6.25, and 6.4 averaging 6.8 seconds  Goal not met: Pt has ranged from 6-8 seconds in last 3 sessions for max phonation with sustained /ah/.    Other areas targeted:  N/A   Education:   Edu to pt re: vocal hygiene practices, memory strategies (writing information down, association, repetition)   Edu to pt re: vocla hygiene practices, memory strategies. Safety Devices: [x] Call light within reach  [] Chair alarm activated  [x] Bed alarm activated  [] Other:  [] Call light within reach  [] Chair alarm activated  [] Bed alarm activated  [] Other:    Assessment: Pt alert and cooperative, agreeable to tx session.  Pt with concerns regarding her memory, thus SLP educated pt on memory strategies - writing information down, repetition, grouping/chunking and association. Pt did not meet goals for recall and max phonation time despite strategies to improve both recall and voicing. Pt did meet goals for problem solving, reasoning, and executive function. Pt reported continued difficulty with voicing. SLP had discussed with pt a referral to an ENT for her voice, but pt has decline and expressed that she did not want \"another camera down my throat. \" Continue speech therapy recommended at d/c 2/2 memory deficits and voicing deficits. SLP recommends 24/7 supervision 2/2 decreased problem solving and insight. Pt requires cueing and assistance with higher level tasks. Pt will also benefit from further speech therapy to target higher level problem solving and executive function. Plan: Recommend 24 hour supervision, further speech therapy services.       Additional Information: N/A    Barriers toward progress: Cognitive deficit, reduced insight into deficits  Discharge recommendations:  [] Home independently  [] Home with assistance [x]  24 hour supervision  [] ECF [x] Other: See PT/OT  Continued Tx Upon Discharge: ? [x] Yes [] No [] TBD based on progress while on ARU [] Vital Stim indicated [] Other:   Estimated discharge date: 01/08/21    Interventions used this date:  [x] Speech/Language Treatment  [] Instruction in HEP [] Group [] Dysphagia Treatment [x] Cognitive Treatment   [x] Other: voice      Total Time Breakdown / Charges    Time in Time out Total Time / units   Cognitive Tx 1000 1045 45 min / 3 units   Speech Tx 1045 1100 15 min / 1 unit   Dysphagia Tx -- -- --       Electronically Signed by  Alejandro Rueda MA CCC-SLP #[id number]  Speech Language Pathologist

## 2021-01-08 NOTE — PROGRESS NOTES
Verna Leonard  1[birth year]20[birth year]  [id number]    Chief Complaint: Hepatic encephalopathy ([address]    Subjective:   Resting in bed. Discharge delayed to tomorrow due to late HD today. ROS: No n/v, cp, sob, f/c    Objective:  Patient Vitals for the past 24 hrs:   BP Temp Temp src Pulse Resp SpO2   01/08/21 0827 116/76 98.2 °F (36.8 °C) Oral 123 18 96 %   01/07/21 2114 112/85 98.1 °F (36.7 °C) Oral 91 18 98 %     Gen: No distress, pleasant. HEENT: Normocephalic, atraumatic. CV: Regular rate and rhythm. Resp: No respiratory distress. Abd: Increased abd distention   Ext: No edema. Neuro: Alert, oriented, appropriately interactive.      Wt Readings from Last 3 Encounters:   01/05/21 114 lb 3.2 oz (51.8 kg)   06/17/20 124 lb (56.2 kg)   06/28/16 124 lb (56.2 kg)       Laboratory data:   Lab Results   Component Value Date    WBC 12.9 (H) 01/05/2021    HGB 9.6 (L) 01/06/2021    HCT 29.8 (L) 01/06/2021    MCV 92.0 01/05/2021    PLT 81 (L) 01/05/2021       Lab Results   Component Value Date     01/08/2021    K 3.3 01/08/2021    K 3.6 06/19/2020    CL 90 01/08/2021    CO2 18 01/08/2021    BUN 11 01/08/2021    CREATININE 2.9 01/08/2021    GLUCOSE 94 01/08/2021    CALCIUM 8.5 01/08/2021        Therapy progress:  PT  Position Activity Restriction  Other position/activity restrictions: L foot drop, R chest port  Objective     Sit to Stand: Modified independent  Stand to sit: Modified independent  Bed to Chair: Modified independent(using RW)  Device: Rolling Walker  Other Apparatus: (LLE ace wrap into dorsiflex assist d/t foot drop)  Assistance: Modified Independent  Distance: 250ft + 250ft (1st session) + 150ft + 200ft + 150ft (2nd session)  OT  PT Equipment Recommendations  Equipment Needed: Yes  Mobility Devices: Unknown Hamming: Rolling  Other: Pt demonstrates improved safety and stability with gait activities with RW for home and community distances, RW with improve safety, decrease risk of falls and decrease burden of

## 2021-01-08 NOTE — PROGRESS NOTES
Occupational Therapy  Facility/Department: [facility]  Daily Treatment Note  NAME: Yin Cosme  : 1990  MRN: [id number]    Date of Service: 2021    Discharge Recommendations:  24 hour supervision or assist, S Level 1, Home with Home health OT  OT Equipment Recommendations  Equipment Needed: Yes  Mobility Devices: ADL Assistive Devices  ADL Assistive Devices: Shower Chair without back    Assessment   Performance deficits / Impairments: Decreased functional mobility ; Decreased endurance;Decreased coordination;Decreased ADL status; Decreased ROM; Decreased balance;Decreased strength;Decreased safe awareness;Decreased cognition;Decreased fine motor control;Decreased high-level IADLs;Decreased posture  Assessment: Pt agreeable to OT session. Pt performed functional mobility in bathroom with supervision to mod I with no AD and one instance of sudden need to sit down due to LE weakness. Pt able to make it to shower chair on own without assistance. Pt educated on need to take time at home, especially in morning and have family member present for shower on the first time. Pt verbalized understanding. Pt completed all BADLs with mod I. Pt completed [facility] tests with similar results to evaluation and no increase in [facility]. Pt completed Suyapa Hoit with fair/poor coordination and stringing beads with fair to good coordination. Pt completed HEP with good strength and knowledge of HEP. Prognosis: Good  OT Education: OT Role;Orientation;Plan of Care;Equipment;Precautions; ADL Adaptive Strategies;Transfer Training;Home Exercise Program;IADL Safety; Energy Conservation  Patient Education: disease specific regarding [facility], tremors, ADL re-education, ther ex, safety in bathroom, energy conservation in bathroom and with ADLs  Barriers to Learning: Pt verbalized understanding but may require reinforcement.   REQUIRES OT FOLLOW UP: Yes  Activity Tolerance  Activity Tolerance: Patient Tolerated treatment well  Safety Devices  Safety Devices in place: Yes  Type of devices: Left in bed;Bed alarm in place;Call light within reach;Nurse notified;Gait belt         Patient Diagnosis(es): The encounter diagnosis was Alcoholic cirrhosis of liver with ascites ([facility] has a past medical history of Acute respiratory failure ([facility] ETOH abuse, Hemodialysis patient ([facility] Hep C w/o coma, chronic ([facility] History of blood transfusion, Opiate dependence, continuous ([facility] and Thyroid disease. has a past surgical history that includes Upper gastrointestinal endoscopy (N/A, 1/7/2021); Upper gastrointestinal endoscopy (1/7/2021); and Colonoscopy (N/A, 1/7/2021). Restrictions  Restrictions/Precautions  Restrictions/Precautions: General Precautions, Fall Risk  Required Braces or Orthoses?: No  Position Activity Restriction  Other position/activity restrictions: L foot drop, R chest port  Subjective   General  Chart Reviewed: Yes, Orders, Progress Notes, History and Physical, Labs  Patient assessed for rehabilitation services?: Yes  Additional Pertinent Hx: Hep C, PNA, Alcohol hepatitis, Herion abuse  Response to previous treatment: Patient with no complaints from previous session  Family / Caregiver Present: No  Referring Practitioner: Gerardo Murphy MD  Diagnosis: hepatic encephalopathy  Subjective  Subjective: Pt sitting on EOB, eating breakfast, needing to go to bathroom, agreeable to OT session. Pain Assessment  Pain Assessment: 0-10  Pain Type: Acute pain  Pain Location: Leg  Pain Orientation: Right;Left;Lower  Pain Descriptors: Aching  Non-Pharmaceutical Pain Intervention(s): Repositioned; Emotional support; Ambulation/Increased Activity  Response to Pain Intervention: Patient Satisfied  Vital Signs  Temp: 98.2 °F (36.8 °C)  Temp Source: Oral  Pulse: 123  Heart Rate Source: Monitor  Resp: 18  BP: 116/76  BP Location: Left upper arm  Patient Position: Sitting  Oxygen Therapy  SpO2: 96 %  Pulse Oximeter Device Mode: Intermittent  Pulse Oximeter Device Location: Right;Finger  O2 Device: None (Room air)   Orientation  Orientation  Overall Orientation Status: Within Functional Limits  Objective    ADL  Feeding: Independent  Grooming: Independent  UE Dressing: Independent  LE Dressing: Independent  Toileting: Modified independent         Balance  Sitting Balance: Modified independent   Standing Balance: Supervision  Standing Balance  Time: 1-2 minutes x4  Activity: bathroom mobility  Comment: no AD, one instance of quick need to sit down due to LE weakness, able to make it to shower chair without assistance  Functional Mobility  Functional - Mobility Device: No device  Activity: To/from bathroom  Assist Level: Supervision  Toilet Transfers  Toilet - Technique: Ambulating  Equipment Used: Standard toilet  Toilet Transfer: Modified independent  Shower Transfers  Shower - Transfer From: Deneen & Polina - Transfer Type: To and From  Shower - Transfer To: Transfer tub bench  Shower - Technique: Ambulating  Shower Transfers: Supervision     Transfers  Stand Step Transfers: Supervision  Sit to stand: Supervision  Stand to sit: Supervision        Coordination  Gross Motor: good to fair coordination for ADLs, fair to poor coordination for [employer] but able to stack all blocks well  Fine Motor: fair to good coordination for stringing beads on shoelace with no drops of items and min VCs for proper sequence of 5 colors              Cognition  Overall Cognitive Status: Exceptions  Arousal/Alertness: Appropriate responses to stimuli  Following Commands:  Follows multistep commands with increased time  Attention Span: Appears intact  Memory: Appears intact  Safety Judgement: Good awareness of safety precautions  Problem Solving: Assistance required to generate solutions  Insights: Fully aware of deficits  Initiation: Does not require cues  Sequencing: Requires cues for some                    Type of ROM/Therapeutic Exercise  Type of ROM/Therapeutic Exercise: Free weights  Comment: 2#  Exercises  Shoulder Flexion: x15  Shoulder Extension: x15  Horizontal ABduction: x15  Horizontal ADduction: x15  Elbow Flexion: x15  Elbow Extension: x15  Supination: x15  Pronation: x15  Wrist Flexion: x15  Wrist Extension: x15        Left 9-Hole Peg Test  Left 9-Hole Peg Test: Functional  Right 9-Hole Peg Test  Right 9-Hole Peg Test: Functional  Fine Motor Skills  Left 9-Hole Peg Test: Functional  Left 9 Hole Peg Test Time (secs): 43  Right 9-Hole Peg Test: Functional  Right 9 Hole Peg Test Time (secs): 42  Other Assessment: Box and Blocks: R-31, L-32           Plan   Plan  Times per week: 5/7 days/week  Plan weeks: 10 days  Current Treatment Recommendations: Strengthening, Patient/Caregiver Education & Training, Home Management Training, Equipment Evaluation, Education, & procurement, ROM, Balance Training, Functional Mobility Training, Endurance Training, Safety Education & Training, Self-Care / ADL    Goals  Short term goals  Time Frame for Short term goals: 1/2/21- 5 days  Short term goal 1: Pt will complete toileting with SBA. -GOAL MET, SBA with RW 1/1  Short term goal 2: Pt will perform functional transfers with SBA.- GOAL MET, 1/2  Long term goals  Time Frame for Long term goals : 1/7/21- 10 days  Long term goal 1: Pt will perform functional transfers with supervision. GOAL MET 1/8/21 Pt performed all functional transfers with supervision/mod I.  Long term goal 2: Pt will complete full body bathing with setup. GOAL MET 1/6/21 Pt completed full body bathing with setup to cover port. Long term goal 3: Pt will perform full body dressing with setup. GOAL MET 1/6/21 Pt completed full body dressing with mod I.  Long term goal 4: Pt will complete grooming with mod I. GOAL MET 1/5/21 Pt performed grooming in stance at sink with mod I.  Long term goal 5: Pt will perform simple homemaking task with supervision. GOAL MET 1/5/21 Pt completed laundry task in stance with supervision.   Patient Goals   Patient goals : \"get better, get stronger, more independent\"       Therapy Time   Individual Concurrent Group Co-treatment   Time In 0730         Time Out 0900         Minutes 90         Timed Code Treatment Minutes: Franco 52 Tr Jaquez

## 2021-01-08 NOTE — PROGRESS NOTES
Delivered walker and shower chair to [employer].   Thanks for the referral.  Ana Dyson  1/8/2021

## 2021-01-08 NOTE — CARE COORDINATION
CASE MANAGEMENT DISCHARGE SUMMARY      Discharge to: home with [facility]. SW spoke with Rita Sin and they are gathering all needed information for admission. Pt has appointments that have been scheduled placed on her LUZ. Pt was given instructions on ho to set up transportation through her insurance. Precertification completed: none   Hospital Exemption Notification (HENS) completed: none     New Durable Medical Equipment ordered/agency: walker and shower chair [employer]     Transportation:    Family/car: pt fiance is picking the Pt up        Confirmed discharge plan with:     Patient: yes     Family, name and contact number: Sw spoke with Pt mother earlier in the week about DC. Pt has set up transportation with her fiance. Pt will continue with [facility].  She will go Monday Wednesday and Fridays 6am     RN, name: Cheri Younger, [employer]

## 2021-01-08 NOTE — PROGRESS NOTES
Physical Therapy  Discharge Summary    Name:Verna Jaime  KDW:[id number]  :1990  Treatment Diagnosis: impaired endurance and functional mobility. Diagnosis: weakness    Restrictions/Precautions  Restrictions/Precautions: General Precautions, Fall Risk  Required Braces or Orthoses?: No           Position Activity Restriction  Other position/activity restrictions: L foot drop, R chest port     Goals:                  Short term goals  Time Frame for Short term goals: Goals to be met by 21  Short term goal 1: Pt to perform bed mobility. independently -- GOAL MET , pt completes bed mobility with MI  Short term goal 2: Pt to perform sit to/from stand to LRAD SBA -- GOAL MET , pt completes transfers with SBA  Short term goal 3: Pt to perform gait X 100 ft with LRAD CGA -- GOAL MET , up to 200' with RW SBA, 100' without AD CGA  Short term goal 4: Pt to go up and down 8 steps with 1 HR CGA -- GOAL MET , pt completes 12 steps with SBA-CGA and unilateral rail            Long term goals  Time Frame for Long term goals : Goals to be met by 21-- goals extended to  based on updated d/c date  Long term goal 1: Pt to perform sit to/from stand with LRAD: Supervision-- GOAL MET , pt completes sit<>stand transfers with mod I-supervision  Long term goal 2: Pt to perform gait X 150 ft with LRAD supervision-- GOAL MET , pt ambulates up to 250ft with supervision-mod I using RW  Long term goal 3: Pt to perform 2 curb steps with LRAD supervision-- GOAL MET , pt completes 2 curb steps with supervision using RW  Long term goal 4: Pt to perform 8 steps with 1 HR and LRAD if needed Supervision-- GOAL MET , pt completes 8 steps with supervision using unilateral rail    Pt. Met 4/4 short term goals and 4/4 long term goals.        Discharge PT IRF:    CARE Score: 6                                   Pt. Currently ambulates 200 feet with mod I using RW  Up/down 8 steps with supervision using unilateral rail (pt uses BUE on 1 rail for added stability/support)  Up/down curb step with supervision using RW  Sit to/from stand with mod I  Bed mobility with independence  Recommend home PT after d/c in order to continue to progress activity tolerance, strength, and functional mobility in order to assist pt to return to PLOF. Pt. Safe to return home with 24 hr assistance from family. Pt reports she has no concerns or questions at this time regarding mobility and safety upon d/c home with family.     Electronically signed by Adan Brooks PT on 1/8/2021 at 1:44 PM

## 2021-01-08 NOTE — PROGRESS NOTES
Progress Note    HISTORY     CC:  Respiratory Failure          We are following for acute kidney injury       Subjective/   HPI:    Paracentesis on 3.1 l on 1/5   HD on 1/3 w no fluid removal , post wt 51.5 kg  HD on 1/5 for 2h wo fluid removal  2 PRBC on 1/5  HD later today    ROS:  Constitutional:  No fevers, No Chills, + weakness  Cardiovascular:  No palpations, + edema  Respiratory:  No wheezing, no cough  Skin:  No rash, no itching  :  No hematuria, no dysuria     Social Hx:  No family at bedside     Scheduled Meds:   darbepoetin ruba-polysorbate  100 mcg Subcutaneous Weekly - Thursday    sodium chloride flush  10 mL Intravenous BID    potassium chloride  40 mEq Oral BID    lactulose  20 g Oral TID    gabapentin  100 mg Oral TID    torsemide  60 mg Oral Daily    ferrous sulfate  324 mg Oral Daily with breakfast    midodrine  10 mg Oral TID WC    therapeutic multivitamin-minerals  1 tablet Oral Daily    folic acid  1 mg Oral Daily    lactobacillus  1 capsule Oral Daily with breakfast    capsaicin   Topical BID    thiamine mononitrate  100 mg Oral Daily    vitamin D  50,000 Units Oral Weekly     Continuous Infusions:   sodium chloride       PRN Meds:.sodium chloride, sodium chloride flush, oxyCODONE **OR** oxyCODONE, heparin (porcine), heparin (porcine), sennosides-docusate sodium, acetaminophen, magnesium hydroxide, hydrOXYzine, ondansetron, bisacodyl      EXAM       Objective/     Vitals:    01/07/21 1010 01/07/21 1053 01/07/21 2114 01/08/21 0827   BP: 101/66 109/77 112/85 116/76   Pulse:  108 91 123   Resp: 16 18 18 18   Temp:  98 °F (36.7 °C) 98.1 °F (36.7 °C) 98.2 °F (36.8 °C)   TempSrc:  Oral Oral Oral   SpO2: 96% 96% 98% 96%   Weight:       Height:         24HR INTAKE/OUTPUT:      Intake/Output Summary (Last 24 hours) at 1/8/2021 1531  Last data filed at 1/8/2021 1208  Gross per 24 hour   Intake 800 ml   Output 100 ml   Net 700 ml     Constitutional:  Alert  Eyes:  Pupils reactive, sclera clear   Neck:  Normal thyroid, no masses   Cardiovascular:  Regular, no rub  Respiratory:  No distress, no wheezing  Psychiatry:  Flat mood/affect, alert  Abdomen: +bs, soft, nt, no masses   Musculoskeletal: + LE edema, no clubbing   Lymphatics:  No LAD in neck, no supraclavicular nodes   Access: [facility]    MEDICAL DECISION MAKING       Data/  Recent Labs     01/05/21  1600 01/06/21  0456   WBC 12.9*  --    HGB 6.7* 9.6*   HCT 21.3* 29.8*   MCV 92.0  --    PLT 81*  --      Recent Labs     01/06/21  0456 01/07/21  1107 01/08/21  0756   * 126* 123*   K 3.4* 3.3* 3.3*   CL 95* 91* 90*   CO2 21 21 18*   GLUCOSE 111* 85 94   PHOS 2.3* 4.0 4.4   BUN 5* 8 11   CREATININE 1.9* 2.4* 2.9*   LABGLOM 31* 23* 19*   GFRAA 37* 28* 23*       Assessment/     Acute Kidney Injury:  KDIGO stage 3  - ATN / septic shock in setting of cirrhosis  - Has been on dialysis with a right TDC  - Showing signs of recovery. No fluid removal with dialysis    Pre HD cr 3.9-->2.8-->2.4 (1/3, 1/5 n 1/7)     Liver disease:  Chronic alcohol abuse, history of hep C  - prior acute alcoholic hepatitis      Respiratory Failure:  MRSA pneumonia and pulmonary edema. Resolved     Anemia of chronic disease:  Hb below target   2 pRBCs on 1/5     Hypotension:  Part of cirrhotic physiology. On midodrine     Plan/   -HD MWF    HD on 1/8  -fluid restriction 1l/day   -Monitor for recovery  -Potassium replacement -40 meq BID  -AMINATA for anemia  -Follow labs     Pt has been accepted at [facility].   nephrology patient

## 2021-01-08 NOTE — PROGRESS NOTES
Occupational Therapy  Discharge Summary     Name:Verna Uribe  BCO:[id number]  :1990  Treatment Diagnosis: impaired endurance and functional mobility. Diagnosis: weakness    Restrictions/Precautions  Restrictions/Precautions: General Precautions, Fall Risk  Required Braces or Orthoses?: No           Position Activity Restriction  Other position/activity restrictions: L foot drop, R chest port     Goals:   Short term goals  Time Frame for Short term goals: 21- 5 days  Short term goal 1: Pt will complete toileting with SBA. -GOAL MET, SBA with RW   Short term goal 2: Pt will perform functional transfers with SBA.- GOAL MET,    Long term goals  Time Frame for Long term goals : 21- 10 days  Long term goal 1: Pt will perform functional transfers with supervision. GOAL MET 21 Pt performed all functional transfers with supervision/mod I.  Long term goal 2: Pt will complete full body bathing with setup. GOAL MET 21 Pt completed full body bathing with setup to cover port. Long term goal 3: Pt will perform full body dressing with setup. GOAL MET 21 Pt completed full body dressing with mod I.  Long term goal 4: Pt will complete grooming with mod I. GOAL MET 21 Pt performed grooming in stance at sink with mod I.  Long term goal 5: Pt will perform simple homemaking task with supervision. GOAL MET 21 Pt completed laundry task in stance with supervision. Pt. Met 2/2 short term goals and 5/5 long term goals.      Current Functional Status:   ADL  Feeding: Independent  Grooming: Independent  UE Bathing: Setup(to cover HD port)  LE Bathing: Modified independent   UE Dressing: Independent  LE Dressing: Independent  Toileting: Modified independent     Bed mobility  Bridging: Contact guard assistance(to brace BLE)  Rolling to Left: Independent  Rolling to Right: Independent  Supine to Sit: Independent  Sit to Supine: Independent  Scooting: Supervision  Comment: sitting EOB upon arrival and exit    Functional Transfers: Toilet Transfers  Toilet - Technique: Ambulating  Equipment Used: Standard toilet  Toilet Transfer: Modified independent  Toilet Transfers Comments: use of grab bar on R    Tub Transfers  Tub - Transfer From: Rolling walker  Tub - Transfer Type: To and From  Tub - Transfer To: Standing  Tub - Technique: Ambulating  Tub Transfers: Supervision    Shower Transfers  Shower - Transfer From: Fitzgerald & Polina - Transfer Type: To and From  Shower - Transfer To: Transfer tub bench  Shower - Technique: Ambulating  Shower Transfers: Supervision           Functional Mobility  Functional - Mobility Device: No device  Activity: To/from bathroom  Assist Level: Supervision  Functional Mobility Comments: mod I with no AD in bathroom    Instrumental ADL's  Instrumental ADLs: Yes     Light Housekeeping  Light Housekeeping Level: Other  Light Housekeeping Level of Assistance: Modified independent  Light Housekeeping: to fold clothes and transport to box in stance                Perception  Overall Perceptual Status: WFL         UE Function:  Hand Dominance  Hand Dominance: Right        Fine Motor Skills  Left 9-Hole Peg Test: Functional  Left 9 Hole Peg Test Time (secs): 43  Right 9-Hole Peg Test: Functional  Right 9 Hole Peg Test Time (secs): 42  Other Assessment: Box and Blocks: R-31, L-32    Assessment:   Assessment: Pt agreeable to OT session. Pt performed functional mobility in bathroom with supervision to mod I with no AD and one instance of sudden need to sit down due to LE weakness. Pt able to make it to shower chair on own without assistance. Pt educated on need to take time at home, especially in morning and have family member present for shower on the first time. Pt verbalized understanding. Pt completed all BADLs with mod I. Pt completed [facility] tests with similar results to evaluation and no increase in [facility].  Pt completed Hershell Thaddeus with fair/poor coordination and stringing beads with fair to good coordination. Pt completed HEP with good strength and knowledge of HEP. Prognosis: Good  Barriers to Learning: Pt verbalized understanding but may require reinforcement. REQUIRES OT FOLLOW UP: Yes  Discharge Recommendations: 24 hour supervision or assist, S Level 1, Home with Home health OT    Equipment Recommendations:  Shower chair without back         Home Exercise Program  Provided Pt with handout for weighted exercises. Pt demonstrated understanding of all exercises.     Electronically signed by Baldemar Garg OT on 1/8/2021 at 2:31 PM

## 2021-01-09 VITALS
RESPIRATION RATE: 16 BRPM | HEIGHT: 66 IN | OXYGEN SATURATION: 98 % | TEMPERATURE: 97.7 F | DIASTOLIC BLOOD PRESSURE: 80 MMHG | WEIGHT: 121.03 LBS | BODY MASS INDEX: 19.45 KG/M2 | HEART RATE: 107 BPM | SYSTOLIC BLOOD PRESSURE: 116 MMHG

## 2021-01-09 LAB
ALBUMIN SERPL-MCNC: 2.4 G/DL (ref 3.4–5)
AMMONIA: 76 UMOL/L (ref 11–51)
ANION GAP SERPL CALCULATED.3IONS-SCNC: 12 MMOL/L (ref 3–16)
BUN BLDV-MCNC: 5 MG/DL (ref 7–20)
CALCIUM SERPL-MCNC: 8 MG/DL (ref 8.3–10.6)
CHLORIDE BLD-SCNC: 99 MMOL/L (ref 99–110)
CO2: 21 MMOL/L (ref 21–32)
CREAT SERPL-MCNC: 1.6 MG/DL (ref 0.6–1.1)
GFR AFRICAN AMERICAN: 45
GFR NON-AFRICAN AMERICAN: 37
GLUCOSE BLD-MCNC: 113 MG/DL (ref 70–99)
PHOSPHORUS: 3.1 MG/DL (ref 2.5–4.9)
POTASSIUM SERPL-SCNC: 3.1 MMOL/L (ref 3.5–5.1)
SODIUM BLD-SCNC: 132 MMOL/L (ref 136–145)

## 2021-01-09 PROCEDURE — 6370000000 HC RX 637 (ALT 250 FOR IP): Performed by: INTERNAL MEDICINE

## 2021-01-09 PROCEDURE — 6370000000 HC RX 637 (ALT 250 FOR IP): Performed by: PHYSICAL MEDICINE & REHABILITATION

## 2021-01-09 PROCEDURE — 82140 ASSAY OF AMMONIA: CPT

## 2021-01-09 PROCEDURE — 36415 COLL VENOUS BLD VENIPUNCTURE: CPT

## 2021-01-09 PROCEDURE — 2580000003 HC RX 258: Performed by: PHYSICAL MEDICINE & REHABILITATION

## 2021-01-09 PROCEDURE — 80069 RENAL FUNCTION PANEL: CPT

## 2021-01-09 RX ORDER — LANOLIN ALCOHOL/MO/W.PET/CERES
100 CREAM (GRAM) TOPICAL DAILY
Status: DISCONTINUED | OUTPATIENT
Start: 2021-01-09 | End: 2021-01-09 | Stop reason: HOSPADM

## 2021-01-09 RX ADMIN — Medication 1 TABLET: at 09:21

## 2021-01-09 RX ADMIN — FOLIC ACID 1 MG: 1 TABLET ORAL at 09:21

## 2021-01-09 RX ADMIN — MIDODRINE HYDROCHLORIDE 10 MG: 5 TABLET ORAL at 09:19

## 2021-01-09 RX ADMIN — GABAPENTIN 100 MG: 100 CAPSULE ORAL at 13:42

## 2021-01-09 RX ADMIN — OXYCODONE 5 MG: 5 TABLET ORAL at 13:42

## 2021-01-09 RX ADMIN — Medication 1 CAPSULE: at 09:19

## 2021-01-09 RX ADMIN — FERROUS SULFATE TAB 325 MG (65 MG ELEMENTAL FE) 324 MG: 325 (65 FE) TAB at 09:20

## 2021-01-09 RX ADMIN — TORSEMIDE 60 MG: 20 TABLET ORAL at 09:20

## 2021-01-09 RX ADMIN — SODIUM CHLORIDE, PRESERVATIVE FREE 10 ML: 5 INJECTION INTRAVENOUS at 09:26

## 2021-01-09 RX ADMIN — Medication 100 MG: at 09:21

## 2021-01-09 RX ADMIN — GABAPENTIN 100 MG: 100 CAPSULE ORAL at 09:21

## 2021-01-09 RX ADMIN — POTASSIUM CHLORIDE 40 MEQ: 20 TABLET, EXTENDED RELEASE ORAL at 09:20

## 2021-01-09 RX ADMIN — OXYCODONE 10 MG: 5 TABLET ORAL at 09:19

## 2021-01-09 RX ADMIN — MIDODRINE HYDROCHLORIDE 10 MG: 5 TABLET ORAL at 12:18

## 2021-01-09 ASSESSMENT — PAIN DESCRIPTION - DESCRIPTORS: DESCRIPTORS: ACHING

## 2021-01-09 ASSESSMENT — PAIN SCALES - GENERAL
PAINLEVEL_OUTOF10: 6
PAINLEVEL_OUTOF10: 8

## 2021-01-09 ASSESSMENT — PAIN DESCRIPTION - PAIN TYPE: TYPE: ACUTE PAIN

## 2021-01-09 NOTE — FLOWSHEET NOTE
Stable tx. NEt UF 500ml. Pt tolerated well.      01/08/21 1740 01/08/21 2055   Vital Signs   Temp 97.9 °F (36.6 °C) 97.6 °F (36.4 °C)   Pulse 105 117   /76 105/65   Height and Weight   Weight 122 lb 2.2 oz (55.4 kg) 121 lb 0.5 oz (54.9 kg)  (standing scale.)

## 2021-01-09 NOTE — PROGRESS NOTES
Discharge instructions reviewed with patient. All home medications have been reviewed, questions answered and patient verbalized understanding. Pt received all  New home meds from out pt pharmacy. Discharge instructions signed and copies given. Patient discharged home with belongings. Pt left floor in stable condition via wheelchair to private vehicle.

## 2021-01-15 ENCOUNTER — TELEPHONE (OUTPATIENT)
Dept: PRIMARY CARE CLINIC | Age: 31
End: 2021-01-15

## 2021-01-15 NOTE — TELEPHONE ENCOUNTER
Chayito Florian is calling she is a in home care coordinator and they would like a verbal to continue OT with patient for 2x a week for 4 weeks.   Please advise  Chayito Florian [phone]

## 2021-01-17 NOTE — DISCHARGE SUMMARY
Physical Medicine & Rehabilitation  Discharge Summary     Patient Identification:  Kailee Kent  : 1990  Admit date: 2020  Discharge date: 2021  Attending provider: No att. providers found        Primary care provider: Russ Jurado MD     Discharge Diagnoses:   Patient Active Problem List   Diagnosis    [facility]atic encephalopathy ([facility] [address]    Abdominal distension    Jaundice    Alcoholic cirrhosis of liver with ascites ([facility] [address]    Portal hypertensive gastropathy ([facility] [address]    Colitis    Anemia    Foreign body in esophagus       Discharge Functional Status:    Physical therapy:  Bed Mobility: Scooting: Supervision  Transfers: Sit to Stand: Modified independent  Stand to sit: Modified independent  Bed to Chair: Modified independent(using RW), Ambulation 1  Surface: level tile  Device: Rolling Walker  Other Apparatus: (LLE ace wrap into dorsiflex assist d/t foot drop)  Assistance: Modified Independent  Quality of Gait: reciprocal pattern, decreased rita, decreased B foot clearance and step length, LLE foot drop. No LOB. Increased reliance on UEs and RW as pt fatigues with increased distance. Gait Deviations: Slow Rita, Decreased step length, Decreased step height, Increased ROLO  Distance: 250ft + 250ft (1st session) + 150ft + 200ft + 150ft (2nd session)  Comments: seated rest breaks required after each bout d/t fatigue, Stairs  # Steps : 8(+ 8)  Stairs Height: 6\"  Rails: Right ascending(pt uses BUE on R rail in order to increase stability/support)  Curbs: 6\"  Device: No Device, Rolling walker(no AD for 8 steps; RW for curb step)  Other Apparatus: (LLE DF assist ace wrap)  Assistance: Supervision  Comment: via non-reciprocal pattern; pt with min B knee flexion as she fatigues with increased number of steps however no overt knee buckling or LOB. Pt requires seated rest break after each bout d/t fatigue.   Mobility:  , PT Equipment Recommendations  Equipment Needed: Yes  Mobility Devices: Mikayla Beckford: Rolling  Other: Pt demonstrates improved safety and stability with gait activities with RW for home and community distances, RW with improve safety, decrease risk of falls and decrease burden of care., Assessment: Pt continues to demo good progress with functional mobility and activity tolerance since admission. Pt able to complete functional transfers with grossly mod I, ambulate community distances with mod I using RW, and up to 8 steps with supervision using unilateral rail. Pt does require therapeutic seated rest breaks throughout session d/t fatigue and deconditioning, however pt with good awareness of need for rest breaks and energy conservation. Pt requesting to practice ambulation, stairs, and tub transfer today in order to increase confidence for completion at home upon d/c this afternoon. After practicing all transfers and mobility during PT sessions, pt reports she has no additional concerns or questions regarding mobility and safety upon d/c home with family this afternoon. Pt has met all established PT goals as of this date. Occupational therapy:  ,  , Assessment: Pt agreeable to OT session. Pt performed functional mobility in bathroom with supervision to mod I with no AD and one instance of sudden need to sit down due to LE weakness. Pt able to make it to shower chair on own without assistance. Pt educated on need to take time at home, especially in morning and have family member present for shower on the first time. Pt verbalized understanding. Pt completed all BADLs with mod I. Pt completed [facility] tests with similar results to evaluation and no increase in [facility]. Pt completed [facility] [place] with fair/poor coordination and stringing beads with fair to good coordination. Pt completed HEP with good strength and knowledge of HEP. Speech therapy:       Inpatient Rehabilitation Course:    Sarah Mustafa is a 26 y.o. female admitted to inpatient rehabilitation on 12/29/2020 s/p Hepatic encephalopathy hydrOXYzine 50 MG capsule  Commonly known as: VISTARIL  Take 1 capsule by mouth 2 times daily as needed for Itching or Anxiety  Notes to patient: used to treat itching, anxiety, treat mood problems and  to treat upset stomach and throwing up  Side effects:fast or abnormal heartbeat,very bad dizziness or passing out,trouble controlling body movements,eeling confused. lactulose 10 GM/15ML solution  Commonly known as: CHRONULAC  Take 30 mLs by mouth 3 times daily  Notes to patient: Lactulose (Jared Denier*, Constulose, Enulose)  Use: treatment of constipation, decrease ammonia levels in hepatic failure  Side effects: diarrhea, nausea/vomiting and abdominal discomfort     midodrine 10 MG tablet  Commonly known as: PROAMATINE  Take 1 tablet by mouth 3 times daily (with meals)  Notes to patient: Used to treat low blood pressure. Side effects: Tingling of the scalp,itching,goosebumps,chills,passing urine more often. potassium chloride 20 MEQ extended release tablet  Commonly known as: KLOR-CON M  Take 2 tablets by mouth 2 times daily  Notes to patient: Use: A mineral used to treat or prevent low blood levels of potassium. Side effects: stomach upset, nausea, vomiting, diarrhea, tingling in hands or feet, appearance of potassium chloride tablet in stool, high potassium levels      therapeutic multivitamin-minerals tablet  Take 1 tablet by mouth daily  Notes to patient:  Used to help growth and good health. Side effects: upset stomach or throwing up, belly pain. thiamine mononitrate 100 MG tablet  Take 1 tablet by mouth daily  Notes to patient:  Used to prevent or treat thiamine (vitamin B1) deficiency.   Side effects:upset stomach, belly pain     torsemide 20 MG tablet  Commonly known as: DEMADEX  Take 3 tablets by mouth daily  Notes to patient: Used to get rid of extra fluid    Side effects:Dizzinessm nausea     vitamin D 1.25 MG (38095 UT) Caps capsule  Commonly known as: ERGOCALCIFEROL  Take 1 capsule by mouth once a week  Notes to patient: Used to treat or prevent low phosphate levels. Side effects: nausea, rash        STOP taking these medications    methadone 10 MG/5ML solution        ASK your doctor about these medications    oxyCODONE 5 MG immediate release tablet  Commonly known as: ROXICODONE  Take 1 tablet by mouth every 4 hours as needed for Pain for up to 5 days. Ask about: Should I take this medication? Where to Get Your Medications      These medications were sent to [address] [facility] [phone]  [address] [place] [address]    Phone: [phone]   · ferrous sulfate 325 (65 Fe) MG tablet  · folic acid 1 MG tablet  · gabapentin 100 MG capsule  · hydrOXYzine 50 MG capsule  · lactulose 10 GM/15ML solution  · midodrine 10 MG tablet  · oxyCODONE 5 MG immediate release tablet  · potassium chloride 20 MEQ extended release tablet  · therapeutic multivitamin-minerals tablet  · thiamine mononitrate 100 MG tablet  · torsemide 20 MG tablet  · vitamin D 1.25 MG (23052 UT) Caps capsule         I spent over 35 minutes on this discharge encounter between counseling, coordination of care, and medication reconciliation.   To comply with [facility] bylaw R.[id number]: Discharge order placed in advance to facilitate discharge planning with rehab team.     Delia Trevizo MD

## (undated) DEVICE — ELECTRODE ECG MONITR FOAM TEAR DROP ADLT RED

## (undated) DEVICE — FORCEP BX STD CAP 240CM RAD JAW 4

## (undated) DEVICE — CANNULA NSL 13FT TUBE AD ETCO2 DIV SAMP M

## (undated) DEVICE — Z DISCONTINUED USE 2276105 GOWN PROTCT UNIV CHST W28IN L49IN SL 24IN BLU SPUNBOND FLM

## (undated) DEVICE — KIT INF CTRL 2OZ LUB TBNG L12FT DBL END BRSH SYR OP4

## (undated) DEVICE — CONMED SCOPE SAVER BITE BLOCK, 20X27 MM: Brand: SCOPE SAVER